# Patient Record
Sex: FEMALE | Race: WHITE | NOT HISPANIC OR LATINO | Employment: STUDENT | URBAN - METROPOLITAN AREA
[De-identification: names, ages, dates, MRNs, and addresses within clinical notes are randomized per-mention and may not be internally consistent; named-entity substitution may affect disease eponyms.]

---
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
## 2020-02-10 ENCOUNTER — OFFICE VISIT (OUTPATIENT)
Dept: OBSTETRICS AND GYNECOLOGY | Facility: CLINIC | Age: 20
End: 2020-02-10
Payer: MEDICAID

## 2020-02-10 VITALS
SYSTOLIC BLOOD PRESSURE: 118 MMHG | WEIGHT: 212.75 LBS | HEIGHT: 67 IN | DIASTOLIC BLOOD PRESSURE: 70 MMHG | BODY MASS INDEX: 33.39 KG/M2

## 2020-02-10 DIAGNOSIS — B37.31 VAGINITIS DUE TO CANDIDA: Primary | ICD-10-CM

## 2020-02-10 LAB
BACTERIAL VAGINOSIS DNA: NEGATIVE
CANDIDA GLABRATA DNA: NEGATIVE
CANDIDA KRUSEI DNA: NEGATIVE
CANDIDA RRNA VAG QL PROBE: NEGATIVE
T VAGINALIS RRNA GENITAL QL PROBE: NEGATIVE

## 2020-02-10 PROCEDURE — 87661 TRICHOMONAS VAGINALIS AMPLIF: CPT

## 2020-02-10 PROCEDURE — 99999 PR PBB SHADOW E&M-EST. PATIENT-LVL II: CPT | Mod: PBBFAC,,, | Performed by: ADVANCED PRACTICE MIDWIFE

## 2020-02-10 PROCEDURE — 99999 PR PBB SHADOW E&M-EST. PATIENT-LVL II: ICD-10-PCS | Mod: PBBFAC,,, | Performed by: ADVANCED PRACTICE MIDWIFE

## 2020-02-10 PROCEDURE — 99213 PR OFFICE/OUTPT VISIT, EST, LEVL III, 20-29 MIN: ICD-10-PCS | Mod: S$PBB,,, | Performed by: ADVANCED PRACTICE MIDWIFE

## 2020-02-10 PROCEDURE — 87801 DETECT AGNT MULT DNA AMPLI: CPT

## 2020-02-10 PROCEDURE — 99213 OFFICE O/P EST LOW 20 MIN: CPT | Mod: S$PBB,,, | Performed by: ADVANCED PRACTICE MIDWIFE

## 2020-02-10 PROCEDURE — 87481 CANDIDA DNA AMP PROBE: CPT | Mod: 59

## 2020-02-10 PROCEDURE — 99212 OFFICE O/P EST SF 10 MIN: CPT | Mod: PBBFAC,TH | Performed by: ADVANCED PRACTICE MIDWIFE

## 2020-02-10 RX ORDER — SERTRALINE HYDROCHLORIDE 25 MG/1
25 TABLET, FILM COATED ORAL DAILY
Qty: 30 TABLET | Refills: 2 | Status: SHIPPED | OUTPATIENT
Start: 2020-02-10 | End: 2023-08-29

## 2020-02-10 NOTE — PROGRESS NOTES
Here for C/O depression for past 2-3 months  Feels like its getting worse,  Denies suicidal thoughts   or urge to harm 5 month old son. Says she has help and good support at home with her mother and .    Also may have yeast infection. Discussed seeking counselor for post-partum depression    I gave her a list of providers to call for appointment.  She agrees to it.  Also gave RX to start  Zoloft QD at 25mg with 2 refills.  Stressed the need for her to call if symptoms worsen or if she   experiences any suicidal thoughts or thoughts of harming her baby.  She agrees.

## 2021-04-16 ENCOUNTER — PATIENT MESSAGE (OUTPATIENT)
Dept: RESEARCH | Facility: HOSPITAL | Age: 21
End: 2021-04-16

## 2022-09-08 ENCOUNTER — OFFICE VISIT (OUTPATIENT)
Dept: URGENT CARE | Facility: CLINIC | Age: 22
End: 2022-09-08
Payer: MEDICAID

## 2022-09-08 VITALS
OXYGEN SATURATION: 98 % | RESPIRATION RATE: 17 BRPM | HEIGHT: 67 IN | WEIGHT: 201 LBS | SYSTOLIC BLOOD PRESSURE: 131 MMHG | TEMPERATURE: 98 F | BODY MASS INDEX: 31.55 KG/M2 | HEART RATE: 81 BPM | DIASTOLIC BLOOD PRESSURE: 89 MMHG

## 2022-09-08 DIAGNOSIS — R05.9 COUGH: Primary | ICD-10-CM

## 2022-09-08 DIAGNOSIS — J06.9 URI, ACUTE: ICD-10-CM

## 2022-09-08 LAB
CTP QC/QA: YES
SARS-COV-2 RDRP RESP QL NAA+PROBE: NEGATIVE

## 2022-09-08 PROCEDURE — U0002: ICD-10-PCS | Mod: QW,S$GLB,, | Performed by: FAMILY MEDICINE

## 2022-09-08 PROCEDURE — 99203 OFFICE O/P NEW LOW 30 MIN: CPT | Mod: S$GLB,,, | Performed by: FAMILY MEDICINE

## 2022-09-08 PROCEDURE — U0002 COVID-19 LAB TEST NON-CDC: HCPCS | Mod: QW,S$GLB,, | Performed by: FAMILY MEDICINE

## 2022-09-08 PROCEDURE — 3079F PR MOST RECENT DIASTOLIC BLOOD PRESSURE 80-89 MM HG: ICD-10-PCS | Mod: CPTII,S$GLB,, | Performed by: FAMILY MEDICINE

## 2022-09-08 PROCEDURE — 3079F DIAST BP 80-89 MM HG: CPT | Mod: CPTII,S$GLB,, | Performed by: FAMILY MEDICINE

## 2022-09-08 PROCEDURE — 1159F MED LIST DOCD IN RCRD: CPT | Mod: CPTII,S$GLB,, | Performed by: FAMILY MEDICINE

## 2022-09-08 PROCEDURE — 1159F PR MEDICATION LIST DOCUMENTED IN MEDICAL RECORD: ICD-10-PCS | Mod: CPTII,S$GLB,, | Performed by: FAMILY MEDICINE

## 2022-09-08 PROCEDURE — 3075F SYST BP GE 130 - 139MM HG: CPT | Mod: CPTII,S$GLB,, | Performed by: FAMILY MEDICINE

## 2022-09-08 PROCEDURE — 3075F PR MOST RECENT SYSTOLIC BLOOD PRESS GE 130-139MM HG: ICD-10-PCS | Mod: CPTII,S$GLB,, | Performed by: FAMILY MEDICINE

## 2022-09-08 PROCEDURE — 3008F PR BODY MASS INDEX (BMI) DOCUMENTED: ICD-10-PCS | Mod: CPTII,S$GLB,, | Performed by: FAMILY MEDICINE

## 2022-09-08 PROCEDURE — 3008F BODY MASS INDEX DOCD: CPT | Mod: CPTII,S$GLB,, | Performed by: FAMILY MEDICINE

## 2022-09-08 PROCEDURE — 99203 PR OFFICE/OUTPT VISIT, NEW, LEVL III, 30-44 MIN: ICD-10-PCS | Mod: S$GLB,,, | Performed by: FAMILY MEDICINE

## 2022-09-08 RX ORDER — ALBUTEROL SULFATE 90 UG/1
2 AEROSOL, METERED RESPIRATORY (INHALATION) EVERY 6 HOURS PRN
Qty: 18 G | Refills: 3 | Status: SHIPPED | OUTPATIENT
Start: 2022-09-08

## 2022-09-08 RX ORDER — LORATADINE 10 MG/1
10 TABLET ORAL DAILY
Qty: 30 TABLET | Refills: 2 | Status: SHIPPED | OUTPATIENT
Start: 2022-09-08 | End: 2023-09-08

## 2022-09-08 RX ORDER — BENZONATATE 100 MG/1
200 CAPSULE ORAL 3 TIMES DAILY PRN
Qty: 30 CAPSULE | Refills: 1 | Status: SHIPPED | OUTPATIENT
Start: 2022-09-08

## 2022-09-08 NOTE — PROGRESS NOTES
"Subjective:       Patient ID: Annalisa Dimas is a 22 y.o. female.    Vitals:  height is 5' 7" (1.702 m) and weight is 91.2 kg (201 lb). Her oral temperature is 98.3 °F (36.8 °C). Her blood pressure is 131/89 and her pulse is 81. Her respiration is 17 and oxygen saturation is 98%.     Chief Complaint: Sinus Problem    This started Tuesday. She has taken tylenol and dayquil which helped a good bit.  C/o sore throat and body ache and cough x 2 days    Has hx of persistent cough for months denies f/c  Not vaccinated      Sinus Problem  This is a new problem. The current episode started in the past 7 days. The problem has been gradually worsening since onset. There has been no fever. The fever has been present for Less than 1 day. Her pain is at a severity of 0/10. She is experiencing no pain. Associated symptoms include congestion, coughing, diaphoresis, headaches, sinus pressure and a sore throat. Pertinent negatives include no chills, hoarse voice, sneezing or swollen glands.     Constitution: Positive for sweating. Negative for chills.   HENT:  Positive for congestion, sinus pressure and sore throat.    Respiratory:  Positive for cough.    Allergic/Immunologic: Negative for sneezing.   Neurological:  Positive for headaches.     Objective:      Physical Exam   Constitutional: She is oriented to person, place, and time. She appears well-developed. She is cooperative.  Non-toxic appearance. She does not appear ill. No distress.   HENT:   Head: Normocephalic and atraumatic.   Ears:   Right Ear: Hearing, tympanic membrane, external ear and ear canal normal.   Left Ear: Hearing, tympanic membrane, external ear and ear canal normal.   Nose: Nose normal. No mucosal edema, rhinorrhea or nasal deformity. No epistaxis. Right sinus exhibits no maxillary sinus tenderness and no frontal sinus tenderness. Left sinus exhibits no maxillary sinus tenderness and no frontal sinus tenderness.   Mouth/Throat: Uvula is midline, oropharynx " is clear and moist and mucous membranes are normal. Mucous membranes are moist. No trismus in the jaw. Normal dentition. No uvula swelling. No oropharyngeal exudate. Oropharynx is clear.   Eyes: Conjunctivae and lids are normal. Pupils are equal, round, and reactive to light. Right eye exhibits no discharge. Left eye exhibits no discharge. No scleral icterus. Extraocular movement intact   Neck: Trachea normal and phonation normal. Neck supple.   Cardiovascular: Normal rate, regular rhythm, normal heart sounds and normal pulses.   Pulmonary/Chest: Effort normal and breath sounds normal. No respiratory distress.   Abdominal: Normal appearance and bowel sounds are normal. She exhibits no distension and no mass. Soft. There is no abdominal tenderness.   Musculoskeletal: Normal range of motion.         General: No deformity. Normal range of motion.   Neurological: She is alert and oriented to person, place, and time. She exhibits normal muscle tone. Coordination normal.   Skin: Skin is warm, dry, intact, not diaphoretic and not pale.   Psychiatric: Her speech is normal and behavior is normal. Judgment and thought content normal.   Nursing note and vitals reviewed.      Assessment:       1. Cough    2. URI, acute          Plan:         Cough  -     POCT COVID-19 Rapid Screening    URI, acute    Other orders  -     benzonatate (TESSALON PERLES) 100 MG capsule; Take 2 capsules (200 mg total) by mouth 3 (three) times daily as needed for Cough.  Dispense: 30 capsule; Refill: 1  -     loratadine (CLARITIN) 10 mg tablet; Take 1 tablet (10 mg total) by mouth once daily.  Dispense: 30 tablet; Refill: 2  -     albuterol (VENTOLIN HFA) 90 mcg/actuation inhaler; Inhale 2 puffs into the lungs every 6 (six) hours as needed for Wheezing. Rescue  Dispense: 18 g; Refill: 3             Results for orders placed or performed in visit on 09/08/22   POCT COVID-19 Rapid Screening   Result Value Ref Range    POC Rapid COVID Negative Negative      Acceptable Yes

## 2023-08-18 ENCOUNTER — TELEPHONE (OUTPATIENT)
Dept: OBSTETRICS AND GYNECOLOGY | Facility: CLINIC | Age: 23
End: 2023-08-18
Payer: MEDICAID

## 2023-08-18 ENCOUNTER — HOSPITAL ENCOUNTER (EMERGENCY)
Facility: OTHER | Age: 23
Discharge: HOME | End: 2023-08-18
Attending: EMERGENCY MEDICINE
Payer: MEDICAID

## 2023-08-18 VITALS
HEART RATE: 98 BPM | BODY MASS INDEX: 37.03 KG/M2 | WEIGHT: 250 LBS | DIASTOLIC BLOOD PRESSURE: 67 MMHG | SYSTOLIC BLOOD PRESSURE: 118 MMHG | RESPIRATION RATE: 18 BRPM | TEMPERATURE: 99 F | HEIGHT: 69 IN | OXYGEN SATURATION: 98 %

## 2023-08-18 DIAGNOSIS — O20.9 BLEEDING IN EARLY PREGNANCY: ICD-10-CM

## 2023-08-18 DIAGNOSIS — O20.9 VAGINAL BLEEDING IN PREGNANCY, FIRST TRIMESTER: Primary | ICD-10-CM

## 2023-08-18 LAB
ALBUMIN SERPL BCP-MCNC: 3.5 G/DL (ref 3.5–5.2)
ALP SERPL-CCNC: 65 U/L (ref 55–135)
ALT SERPL W/O P-5'-P-CCNC: 31 U/L (ref 10–44)
ANION GAP SERPL CALC-SCNC: 9 MMOL/L (ref 8–16)
AST SERPL-CCNC: 27 U/L (ref 10–40)
B-HCG UR QL: POSITIVE
BASOPHILS # BLD AUTO: 0.07 K/UL (ref 0–0.2)
BASOPHILS NFR BLD: 0.8 % (ref 0–1.9)
BILIRUB SERPL-MCNC: 0.3 MG/DL (ref 0.1–1)
BUN SERPL-MCNC: 9 MG/DL (ref 6–20)
CALCIUM SERPL-MCNC: 9.2 MG/DL (ref 8.7–10.5)
CHLORIDE SERPL-SCNC: 108 MMOL/L (ref 95–110)
CO2 SERPL-SCNC: 21 MMOL/L (ref 23–29)
CREAT SERPL-MCNC: 0.8 MG/DL (ref 0.5–1.4)
CTP QC/QA: YES
DIFFERENTIAL METHOD: NORMAL
EOSINOPHIL # BLD AUTO: 0.2 K/UL (ref 0–0.5)
EOSINOPHIL NFR BLD: 2 % (ref 0–8)
ERYTHROCYTE [DISTWIDTH] IN BLOOD BY AUTOMATED COUNT: 13.1 % (ref 11.5–14.5)
EST. GFR  (NO RACE VARIABLE): >60 ML/MIN/1.73 M^2
GLUCOSE SERPL-MCNC: 110 MG/DL (ref 70–110)
HCG INTACT+B SERPL-ACNC: NORMAL MIU/ML
HCT VFR BLD AUTO: 40.2 % (ref 37–48.5)
HCV AB SERPL QL IA: NEGATIVE
HGB BLD-MCNC: 13.4 G/DL (ref 12–16)
HIV 1+2 AB+HIV1 P24 AG SERPL QL IA: NEGATIVE
IMM GRANULOCYTES # BLD AUTO: 0.04 K/UL (ref 0–0.04)
IMM GRANULOCYTES NFR BLD AUTO: 0.4 % (ref 0–0.5)
LYMPHOCYTES # BLD AUTO: 2.5 K/UL (ref 1–4.8)
LYMPHOCYTES NFR BLD: 27.2 % (ref 18–48)
MCH RBC QN AUTO: 29.8 PG (ref 27–31)
MCHC RBC AUTO-ENTMCNC: 33.3 G/DL (ref 32–36)
MCV RBC AUTO: 89 FL (ref 82–98)
MONOCYTES # BLD AUTO: 0.6 K/UL (ref 0.3–1)
MONOCYTES NFR BLD: 6.8 % (ref 4–15)
NEUTROPHILS # BLD AUTO: 5.8 K/UL (ref 1.8–7.7)
NEUTROPHILS NFR BLD: 62.8 % (ref 38–73)
NRBC BLD-RTO: 0 /100 WBC
PLATELET # BLD AUTO: 352 K/UL (ref 150–450)
PMV BLD AUTO: 9.6 FL (ref 9.2–12.9)
POTASSIUM SERPL-SCNC: 4 MMOL/L (ref 3.5–5.1)
PROT SERPL-MCNC: 7.3 G/DL (ref 6–8.4)
RBC # BLD AUTO: 4.5 M/UL (ref 4–5.4)
SODIUM SERPL-SCNC: 138 MMOL/L (ref 136–145)
WBC # BLD AUTO: 9.15 K/UL (ref 3.9–12.7)

## 2023-08-18 PROCEDURE — 84702 CHORIONIC GONADOTROPIN TEST: CPT | Performed by: PHYSICIAN ASSISTANT

## 2023-08-18 PROCEDURE — 80053 COMPREHEN METABOLIC PANEL: CPT | Performed by: PHYSICIAN ASSISTANT

## 2023-08-18 PROCEDURE — 25000003 PHARM REV CODE 250: Performed by: PHYSICIAN ASSISTANT

## 2023-08-18 PROCEDURE — 85025 COMPLETE CBC W/AUTO DIFF WBC: CPT | Performed by: PHYSICIAN ASSISTANT

## 2023-08-18 PROCEDURE — 86803 HEPATITIS C AB TEST: CPT | Performed by: PHYSICIAN ASSISTANT

## 2023-08-18 PROCEDURE — 99284 EMERGENCY DEPT VISIT MOD MDM: CPT | Mod: 25

## 2023-08-18 PROCEDURE — 81025 URINE PREGNANCY TEST: CPT | Performed by: PHYSICIAN ASSISTANT

## 2023-08-18 PROCEDURE — 96361 HYDRATE IV INFUSION ADD-ON: CPT

## 2023-08-18 PROCEDURE — 87389 HIV-1 AG W/HIV-1&-2 AB AG IA: CPT | Performed by: PHYSICIAN ASSISTANT

## 2023-08-18 PROCEDURE — 96360 HYDRATION IV INFUSION INIT: CPT

## 2023-08-18 RX ADMIN — SODIUM CHLORIDE 1000 ML: 0.9 INJECTION, SOLUTION INTRAVENOUS at 01:08

## 2023-08-18 NOTE — TELEPHONE ENCOUNTER
Tried contacting pt in regards to scheduling appt. No answer. Left VM for pt to contact clinic back.

## 2023-08-18 NOTE — TELEPHONE ENCOUNTER
----- Message from Anabel Wyatt MD sent at 8/18/2023  3:57 PM CDT -----  Hello,    This patient presented to the ED for vaginal bleeding in early pregnancy. Her ultrasound findings are inconclusive and she will need a repeat U/S in 11 days (8/29) to determine viability. Can we have her follow up after this U/S in our clinic? Thanks!    Anabel Wyatt MD  OBGYN PGY-3

## 2023-08-18 NOTE — CARE UPDATE
Pt presented to [facility] for evaluation. Chief complaint: vaginal bleeding, noticed while taking a shower this AM. She is ambulatory and has no signs or symptoms of anemia. Pt is approximately 6 weeks pregnant, has not yet established prenatal care.   Temp:  [98.2 °F (36.8 °C)] 98.2 °F (36.8 °C)  Pulse:  [107] 107  Resp:  [20] 20  SpO2:  [98 %] 98 %  BP: (132)/(74) 132/74    Pt to be transported to main ED for further evaluation.  Bridgette Rivas MD  OB Hospitalist

## 2023-08-18 NOTE — DISCHARGE INSTRUCTIONS
You were seen in the ER for vaginal bleeding. Based on your ultrasound, there is concern that you may be at risk for having a miscarriage. Given this, OB recommends you have a repeat ultrasound. They will get in touch with your midwives and have you scheduled for this ultrasound.  Return to the ER if you begin soaking through 1 pad an hour, if you began having heavy bleeding with clots or with associated lightheadedness or syncope, or for any new or worsening symptoms.

## 2023-08-18 NOTE — ED NOTES
Pt to dispo with c.o vaginal bleeding onset last night with cramping. Pt had +home preg test and is approx 6 wk ob.  Pt has not had US. Pt has appt scheduled.  Pt states she passed a clot that appeared to be tissue last night. Pt states bleeding and cramping has subsided.  AAO x 3 nadn skin w.d  conjunctiva pink   pt placed in gown

## 2023-08-20 NOTE — ED PROVIDER NOTES
Encounter Date: 2023       History     Chief Complaint   Patient presents with    Vaginal Bleeding     Reports vag bleeding that started last night, no bleeding today, white pinkish color. +6 weeks pregnant. Denies UTI symptoms.     Afebrile 23-year-old female who is  with current pregnancy at 6 weeks gestation presents the ED for evaluation of vaginal bleeding.  Patient states that bleeding occurred last night.  States bleeding has since resolved.  Does report some associated cramping.  States last night she felt like she passed area of tissue.  States that the cramping has since improved.  Any fever, chills, vomiting, dysuria, hematuria or additional complaints.  She does follow with the [facility] bir[facility].    The history is provided by the patient.     Review of patient's allergies indicates:  No Known Allergies  Past Medical History:   Diagnosis Date    Postpartum depression 2/10/2020     No past surgical history on file.  Family History   Problem Relation Age of Onset    Cancer Maternal Grandmother     Hypertension Maternal Grandmother     Hypertension Mother     Arrhythmia Neg Hx     Cardiomyopathy Neg Hx     Congenital heart disease Neg Hx     Heart attacks under age 50 Neg Hx     Pacemaker/defibrilator Neg Hx      Social History     Tobacco Use    Smoking status: Never    Smokeless tobacco: Never   Substance Use Topics    Alcohol use: No    Drug use: No     Review of Systems  See HPI  Physical Exam     Initial Vitals [23 1108]   BP Pulse Resp Temp SpO2   132/74 107 20 98.2 °F (36.8 °C) 98 %      MAP       --         Physical Exam    Nursing note and vitals reviewed.  Constitutional: Vital signs are normal. She appears well-developed and well-nourished. She is cooperative.  Non-toxic appearance. She does not appear ill. No distress.   HENT:   Head: Normocephalic and atraumatic.   Eyes: Conjunctivae and lids are normal.   Neck: Neck supple.   Cardiovascular:  Normal rate and regular  rhythm.           Pulmonary/Chest: Breath sounds normal. No respiratory distress. She has no wheezes. She has no rhonchi.   Abdominal: Abdomen is soft. Bowel sounds are normal. There is no abdominal tenderness. There is no rigidity and no guarding.   Musculoskeletal:         General: Normal range of motion.      Cervical back: Neck supple. No rigidity.     Neurological: She is alert and oriented to person, place, and time. She has normal strength. GCS score is 15. GCS eye subscore is 4. GCS verbal subscore is 5. GCS motor subscore is 6.   Skin: Skin is warm, dry and intact. No rash noted.   Psychiatric: She has a normal mood and affect. Her speech is normal and behavior is normal. Thought content normal.         ED Course   Procedures  Labs Reviewed   COMPREHENSIVE METABOLIC PANEL - Abnormal; Notable for the following components:       Result Value    CO2 21 (*)     All other components within normal limits    Narrative:     Release to patient->Immediate   POCT URINE PREGNANCY - Abnormal; Notable for the following components:    POC Preg Test, Ur Positive (*)     All other components within normal limits   HIV 1 / 2 ANTIBODY    Narrative:     Release to patient->Immediate   HEPATITIS C ANTIBODY    Narrative:     Release to patient->Immediate   CBC W/ AUTO DIFFERENTIAL    Narrative:     Release to patient->Immediate   HCG, QUANTITATIVE    Narrative:     Release to patient->Immediate          Imaging Results              US OB <14 Wks TransAbd & TransVag, Single Gestation (XPD) (Final result)  Result time 08/18/23 14:41:43      Final result by Artur Dietz Jr., MD (08/18/23 14:41:43)                   Impression:      Very early intrauterine gestation of unknown viability and an average ultrasound age 6 weeks 0 days.  Short-term follow-up is suggested.      Electronically signed by: Artur Quiroz Jr  Date:    08/18/2023  Time:    14:41               Narrative:    EXAMINATION:  US OB <14 WEEKS, TRANSABDOM &  TRANSVAG, SINGLE GESTATION (XPD)    CLINICAL HISTORY:  Vag Bleeding;    TECHNIQUE:  Transabdominal sonography of the pelvis was performed, followed by transvaginal sonography to better evaluate the uterus and ovaries.    COMPARISON:  2019    FINDINGS:  Intrauterine gestation(s): Single    Mean gestational sac diameter: 1.2 cm    Yolk sac: Present    Crown-rump length (CRL): No fetal pole identified    Cardiac activity: None    Subchorionic hemorrhage: None.    Right ovary: Normal.    Left ovary: Normal.    Miscellaneous: No Free Fluid.                                       Medications   sodium chloride 0.9% bolus 1,000 mL 1,000 mL (0 mLs Intravenous Stopped 23 1542)     Medical Decision Making  Amount and/or Complexity of Data Reviewed  Labs: ordered.  Radiology: ordered.    Historical chart review reveals Rh positive           ED Course as of 23 1701   Fri Aug 18, 2023   1621 I assumed care of patient from Jovana Del Toro PA-C. OB provider had initially indicated concern for missed  and wanted to evaluate patient. Upon assuming care of patient, OB provider called back and clarified that they took another look at patient's ultrasound and labs and believe this is classified as vaginal bleeding during pregnancy. Do not believe diagnosis of missed  is indicated at this time. Would like patient to obtain repeat ultrasound in 1-2 weeks and follow up with midwives. They do not believe they need to evaluate patient at this time. I informed patient of this and answered all questions to patient's satisfaction. Will plan on performing pelvic exam and discharging with close follow up. Strict return precautions given and patient verbalized understanding.  [SW]   7279 Pelvic exam performed, no active bleeding, cervix closed, vaginal mucosa pink and moist, no significant discharge. On bimanual exam, no CMT, adnexal, uterine tenderness. Patient stable for discharge with outpatient follow up and  return precautions.  [SW]      ED Course User Index  [SW] Starla Browne, HARDEEP               Medical Decision Making:   History:   Old Medical Records: I decided to obtain old medical records.  Initial Assessment:   Emergent evaluation 23-year-old female presenting the ED additional bleeding in early pregnancy of unknown location.  She appears well in no significant distress.  Abdomen is soft and no tenderness.   exam deferred after ultrasound.  Remaining exam unremarkable  Differential Diagnosis:   Differential Diagnosis includes, but is not limited to:  Pregnancy complication (threatened AB, inevitable AB, incomplete Ab, missed AB, uterine rupture, ectopic pregnancy, placental abruption, placenta previa), ovarian cyst/torsion, STD, foreign body, trauma, normal menstruation.    Clinical Tests:   Lab Tests: Reviewed and Ordered  Radiological Study: Reviewed and Ordered  ED Management:  Labs with no leukocytosis, hCG consistent with pregnancy with 17,221 here.  Ultrasound does reveal IUP at however unknown viability is no fetal pole or heart rate activity at this time.  Did discuss findings with OB.  Initially reported discussing meds with expected verses interventions as suspect failed intrauterine pregnancy however in the ER.      Clinical Impression:   Final diagnoses:  [O20.9] Vaginal bleeding in pregnancy, first trimester (Primary)        ED Disposition Condition    Discharge Stable          ED Prescriptions    None       Follow-up Information       Follow up With Specialties Details Why Contact Info    Your midwife  Schedule an appointment as soon as possible for a visit in 1 week      Repeat ultrasound appointment  Go to       [facility] Dept Emergency Medicine Go to  If symptoms worsen [address] [facility] [id number]  [phone]             Jovana Del Toro PA  08/20/23 [id number]

## 2023-08-21 ENCOUNTER — LAB VISIT (OUTPATIENT)
Dept: LAB | Facility: HOSPITAL | Age: 23
End: 2023-08-21
Payer: MEDICAID

## 2023-08-21 ENCOUNTER — TELEPHONE (OUTPATIENT)
Dept: OBSTETRICS AND GYNECOLOGY | Facility: CLINIC | Age: 23
End: 2023-08-21
Payer: MEDICAID

## 2023-08-21 DIAGNOSIS — N93.9 VAGINAL BLEEDING: ICD-10-CM

## 2023-08-21 DIAGNOSIS — Z32.01 POSITIVE PREGNANCY TEST: Primary | ICD-10-CM

## 2023-08-21 DIAGNOSIS — N93.9 VAGINAL BLEEDING: Primary | ICD-10-CM

## 2023-08-21 LAB — HCG INTACT+B SERPL-ACNC: NORMAL MIU/ML

## 2023-08-21 PROCEDURE — 84702 CHORIONIC GONADOTROPIN TEST: CPT

## 2023-08-21 PROCEDURE — 36415 COLL VENOUS BLD VENIPUNCTURE: CPT

## 2023-08-21 NOTE — TELEPHONE ENCOUNTER
Spoke to patient regarding recent ED visit for bleeding, cramping. Bleeding has resolved. Placed order for hcg, will consider moving up US one week. Has amenorrhea appointment scheduled with [facility].

## 2023-08-29 ENCOUNTER — INITIAL PRENATAL (OUTPATIENT)
Dept: OBSTETRICS AND GYNECOLOGY | Facility: CLINIC | Age: 23
End: 2023-08-29
Payer: MEDICAID

## 2023-08-29 ENCOUNTER — HOSPITAL ENCOUNTER (OUTPATIENT)
Dept: PERINATAL CARE | Facility: OTHER | Age: 23
Discharge: HOME | End: 2023-08-29
Payer: MEDICAID

## 2023-08-29 VITALS
WEIGHT: 256.94 LBS | SYSTOLIC BLOOD PRESSURE: 122 MMHG | BODY MASS INDEX: 37.94 KG/M2 | DIASTOLIC BLOOD PRESSURE: 70 MMHG

## 2023-08-29 DIAGNOSIS — E66.9 OBESITY (BMI 35.0-39.9 WITHOUT COMORBIDITY): Primary | ICD-10-CM

## 2023-08-29 DIAGNOSIS — Z3A.01 7 WEEKS GESTATION OF PREGNANCY: ICD-10-CM

## 2023-08-29 DIAGNOSIS — O21.9 NAUSEA AND VOMITING DURING PREGNANCY: ICD-10-CM

## 2023-08-29 DIAGNOSIS — Z34.83 ENCOUNTER FOR SUPERVISION OF OTHER NORMAL PREGNANCY IN THIRD TRIMESTER: ICD-10-CM

## 2023-08-29 DIAGNOSIS — Z32.01 POSITIVE PREGNANCY TEST: ICD-10-CM

## 2023-08-29 DIAGNOSIS — O99.011 ANEMIA AFFECTING PREGNANCY IN FIRST TRIMESTER: ICD-10-CM

## 2023-08-29 PROCEDURE — 99214 OFFICE O/P EST MOD 30 MIN: CPT | Mod: S$PBB,TH,, | Performed by: ADVANCED PRACTICE MIDWIFE

## 2023-08-29 PROCEDURE — 76801 US OB/GYN PROCEDURE (VIEWPOINT): ICD-10-PCS | Mod: 26,,, | Performed by: OBSTETRICS & GYNECOLOGY

## 2023-08-29 PROCEDURE — 76801 OB US < 14 WKS SINGLE FETUS: CPT | Mod: 26,,, | Performed by: OBSTETRICS & GYNECOLOGY

## 2023-08-29 PROCEDURE — 99212 OFFICE O/P EST SF 10 MIN: CPT | Mod: PBBFAC,25,TH | Performed by: ADVANCED PRACTICE MIDWIFE

## 2023-08-29 PROCEDURE — 99214 PR OFFICE/OUTPT VISIT, EST, LEVL IV, 30-39 MIN: ICD-10-PCS | Mod: S$PBB,TH,, | Performed by: ADVANCED PRACTICE MIDWIFE

## 2023-08-29 PROCEDURE — 99999 PR PBB SHADOW E&M-EST. PATIENT-LVL II: ICD-10-PCS | Mod: PBBFAC,,, | Performed by: ADVANCED PRACTICE MIDWIFE

## 2023-08-29 PROCEDURE — 76801 OB US < 14 WKS SINGLE FETUS: CPT

## 2023-08-29 PROCEDURE — 99999 PR PBB SHADOW E&M-EST. PATIENT-LVL II: CPT | Mod: PBBFAC,,, | Performed by: ADVANCED PRACTICE MIDWIFE

## 2023-08-29 RX ORDER — PRENATAL VIT NO.126/IRON/FOLIC 28MG-0.8MG
1 TABLET ORAL DAILY
Qty: 90 TABLET | Refills: 3 | Status: SHIPPED | OUTPATIENT
Start: 2023-08-29 | End: 2023-08-30

## 2023-08-29 RX ORDER — ONDANSETRON 4 MG/1
4 TABLET, FILM COATED ORAL EVERY 6 HOURS PRN
Qty: 30 TABLET | Refills: 1 | Status: SHIPPED | OUTPATIENT
Start: 2023-08-29 | End: 2023-08-30

## 2023-08-29 RX ORDER — PROMETHAZINE HYDROCHLORIDE 25 MG/1
25 TABLET ORAL EVERY 6 HOURS PRN
Qty: 30 TABLET | Refills: 1 | Status: SHIPPED | OUTPATIENT
Start: 2023-08-29 | End: 2023-08-30

## 2023-08-29 RX ORDER — DOCUSATE SODIUM 100 MG/1
100 CAPSULE, LIQUID FILLED ORAL 2 TIMES DAILY PRN
Qty: 30 CAPSULE | Refills: 1 | Status: SHIPPED | OUTPATIENT
Start: 2023-08-29 | End: 2023-08-30

## 2023-08-29 RX ORDER — FERROUS SULFATE 325(65) MG
325 TABLET, DELAYED RELEASE (ENTERIC COATED) ORAL
Qty: 90 TABLET | Refills: 3 | Status: SHIPPED | OUTPATIENT
Start: 2023-08-29 | End: 2023-08-30

## 2023-08-29 NOTE — PROGRESS NOTES
Annalisa Dietrich is a 23 y.o. , presents today for amenorrrhea visit. Now 7 + 2/7 weeks.   See in ER 23, HCG Quant 17,221. Increased to 26,252 on 23. No lof/BRVB, dysuria, fever/chills or abdominal pain/cramps. + N&V not relieved with comfort measures.   US today = IUP at 7 + 2/7 weeks with EDC of 23.     OBHX:   x 2 at term, proven to # 8 lb 1 oz.   Hx PP Depression following last delivery tx briefly with SSRI. No current issues.  Obesity- plan HgbA1C with new ob labs. Declines nutrition consult.   Anemia in pregnancy-begin Po Iron.  Nausea -RX for Zofran, Phenergan.  PAP 2019 wnl.   PMHX:   Obesity, BMI 37%  SOCIAL HISTORY: Denies emotional/mental/physical/sexual violence or abuse. Feels safe at home. Happily wed to [facility].   Denies tobacco, etoh or substance use in pregnancy.   NKDA  SURGHX: None  MEDS:   Daily PNV    Review of patient's allergies indicates:  No Known Allergies  Past Medical History:   Diagnosis Date    Anemia affecting pregnancy in first trimester 2023    Postpartum depression 2/10/2020     History reviewed. No pertinent surgical history.  History reviewed. No pertinent surgical history.  OB History    Para Term  AB Living   3 2 2   0 2   SAB IAB Ectopic Multiple Live Births         0 2      # Outcome Date GA Lbr Giovani/2nd Weight Sex Delivery Anes PTL Lv   3 Current            2 Term 19 39w5d  3.62 kg (7 lb 15.7 oz) M Vag-Spont EPI N ELINOR   1 Term     F Vag-Spont   ELINOR     OB History        3    Para   2    Term   2            AB   0    Living   2       SAB        IAB        Ectopic        Multiple   0    Live Births   2               Social History     Socioeconomic History    Marital status:    Tobacco Use    Smoking status: Never    Smokeless tobacco: Never   Substance and Sexual Activity    Alcohol use: No    Drug use: No    Sexual activity: Yes   Social History Narrative    Fob aunt  @ 36 from heart attack      Family History   Problem Relation Age of Onset    Cancer Maternal Grandmother     Hypertension Maternal Grandmother     Hypertension Mother     Arrhythmia Neg Hx     Cardiomyopathy Neg Hx     Congenital heart disease Neg Hx     Heart attacks under age 50 Neg Hx     Pacemaker/defibrilator Neg Hx      Social History     Substance and Sexual Activity   Sexual Activity Yes       GENETIC SCREENING   Patient's age 35 years or older as of estimated date of delivery? no  Neural tube defect (meningomyelocele, spina bifida, or anencephaly)? No  Down syndrome? no  Gagan-Sachs (Ashkenazi Gnosticism, Cajun, Italian Swazi)? no  Canavan disease (Ashkenazi Gnosticism)? no  Familial dysautonomia (Ashkenazi Gnosticism)? no  Sickle cell disease or trait ()? denies  Hemophilia or other blood disorders? no  Cystic fibrosis? no  Muscular dystrophy? no  Belknap's chorea? no  Thalassemia (Italian, Greek, Mediterranean, or  background) MCV less than 80? no  Congenital heart defect? no  Mental retardation/autism? no   If Yes, was person tested for Fragile X? no  Other inherited genetic or chromosomal disorder? no  Maternal metabolic disorder (e.g. type 1 diabetes, PKU)? no  Patient or baby's father had a child with birth defects not listed above? no  Recurrent pregnancy loss or a stillbirth: no  Medications (including supplements, vitamins, herbs or OTC drugs)/illicit/recreational drugs/alcohol since last menstrual period? PNV   If yes, agent(s) and strength/dose:   List any other genetic risks:  Comments/counseling:     INFECTION HISTORY  Live with someone with TB or exposed to TB: no  Patient or partner has history of genital herpes: no  Rash or viral illness since last menstrual period: no  Patient or partner has hepatitis B or C: no  History of STD, gonorrhea, chlamydia, HPV, HIV, syphilis (list all that apply): no  List other infections: no    ROS:  Constitutional/Gen: Denies fevers, chills, malaise, or weight loss. +  N&V  Psych: Denies depression, anxiety  Eyes: Denies changes in vision or scotomata  Ears, nose, mouth, throat: Denies sinus tenderness, swelling, or dentition problems  CV/vasc: Denies heart palpitations or edema  Resp: Denies SOB or dyspnea  Breasts: Denies mass, nipple discharge, or trauma.   GI: Denies constipation or diarrhea.  : Denies vaginal discharge, dysuria or pelvic pain.   MS: Denies weakness, soreness, or changes in ROM    OBJECTIVE:  /70   Wt 116.6 kg (256 lb 15.1 oz)   BMI 37.94 kg/m²   Constitutional/Gen: NAD, appears stated age, well groomed, NAD  Neck: supple, no masses or enlargement  Head: normocephalic  Skin: warm and dry w/o rash  Lung: normal resp effort, CTAB  Heart: normal HR, RRR   Back: negative CVAT  Breasts: Deferred  Abdomen: soft, nontender, no masses, and bowel sounds normal, no enlargement  Pelvic: Deferred  Adnexa: no masses or tenderness  Extremities: FROM, with no edema or tenderness.  Neurologic: A&O x 4, non-focal, cranial nerves 2-12 grossly intact  Psych: affect appropriate and without signs of mood, thought or memory difficulty appreciated    ASSESSMENT:  23 y.o. female  at 7 + 2/7 weekg gestation  Patient Active Problem List   Diagnosis    Postpartum depression    Positive pregnancy test    Obesity (BMI 35.0-39.9 without comorbidity)    7 weeks gestation of pregnancy    Nausea and vomiting during pregnancy    Anemia affecting pregnancy in first trimester       PLAN:    Pregnancy education and couseling; handouts and booklet provided  -- Oriented to practice and anticipated prenatal course of care and how to contact us  -- Reviewed [facility] guidelines and admission, exclusion, and transfer criteria  -- Precautions/warning signs reviewed  -- Common complaints of pregnancy  -- Routine prenatal labs including HIV, CBC and Hep C reviewed and discussed. + Anemia.  -- Ultrasounds-plan anatomy at 20 weeks.   -- Childbirth education/hospital/[facility] facilities  --  Nutrition, prepregnant BMI, and recommended weight gain  -- Toxoplasmosis precautions (Cats/Raw Meat)  -- Sexual activity and exercise  -- Environmental/Work hazards  -- Travel  -- Tobacco, as well as alcohol and drug use  -- Use of any medications (Including supplements, Vitamins, Herbs, or OTC Drugs)  -- Domestic violence screen     Vaccines:    - Has not received Influenza vaccine for 2022/2023 season   - Reports has not received any doses of Covid-19 vaccine. Does not desire.   - Education regarding vaccine recommendations in pregnancy; handouts given.    Body mass index is 37.94 kg/m².  -- Discussed IOM recommended weight gain of:   Weight category Pre pre BMI  Recommended TWG   Underweight Less than 18.5 28-40    Normal Weight 18.5-24.9  25-35    Overweight 25-29.9  15-25    Obese   30 and greater  11-20   Plan HgbA1C with new ob labs    Nausea  in pregnancy  -- Education regarding lifestyle and dietary modifications  -- Reviewed use of B6/Unisom prn. Pt will notify us if no relief/worsening symptoms, will consider alternative therapies prn  RX for Zofran and Phenergan to pharmacy/discussed.    Reviewed genetic testing options.    -- Reviewed available first trimester and/or second  trimester screening options. Reviewed risk of false positive/negative results and recommendation of referral to MFM in event of a positive result, for NIPT, US, and/or amniocentesis.  Patient desires MT21 then AFP. Plan at next visit with remaining new ob labs and PAP Smear/pelvic exam.  Initial OB with remaining OB labs, PAP/Pelvic, and MT21 in 3 weeks.      Updated Medication List:  Current Outpatient Medications   Medication Sig Dispense Refill    docusate sodium (COLACE) 100 MG capsule Take 1 capsule (100 mg total) by mouth 2 (two) times daily as needed for Constipation. 30 capsule 1    ferrous sulfate 325 (65 FE) MG EC tablet Take 1 tablet (325 mg total) by mouth 3 (three) times daily with meals. 90 tablet 3    ondansetron  (ZOFRAN) 4 MG tablet Take 1 tablet (4 mg total) by mouth every 6 (six) hours as needed for Nausea. 30 tablet 1    prenatal vit no.126-iron-folic (CLASSIC PRENATAL) 28 mg iron- 800 mcg Tab Take 1 tablet by mouth once daily. 90 tablet 3    promethazine (PHENERGAN) 25 MG tablet Take 1 tablet (25 mg total) by mouth every 6 (six) hours as needed for Nausea. 30 tablet 1     No current facility-administered medications for this visit.         Florinda Stevens CNM/NP

## 2023-08-30 DIAGNOSIS — Z34.83 ENCOUNTER FOR SUPERVISION OF OTHER NORMAL PREGNANCY IN THIRD TRIMESTER: ICD-10-CM

## 2023-08-30 RX ORDER — FERROUS SULFATE 325(65) MG
325 TABLET, DELAYED RELEASE (ENTERIC COATED) ORAL
Qty: 90 TABLET | Refills: 3 | Status: SHIPPED | OUTPATIENT
Start: 2023-08-30 | End: 2023-09-19 | Stop reason: CLARIF

## 2023-08-30 RX ORDER — DOCUSATE SODIUM 100 MG/1
100 CAPSULE, LIQUID FILLED ORAL 2 TIMES DAILY PRN
Qty: 30 CAPSULE | Refills: 1 | Status: SHIPPED | OUTPATIENT
Start: 2023-08-30 | End: 2024-02-22 | Stop reason: SDUPTHER

## 2023-08-30 RX ORDER — PROMETHAZINE HYDROCHLORIDE 25 MG/1
25 TABLET ORAL EVERY 6 HOURS PRN
Qty: 30 TABLET | Refills: 1 | OUTPATIENT
Start: 2023-08-30 | End: 2024-01-31

## 2023-08-30 RX ORDER — PRENATAL VIT NO.126/IRON/FOLIC 28MG-0.8MG
1 TABLET ORAL DAILY
Qty: 90 TABLET | Refills: 3 | Status: SHIPPED | OUTPATIENT
Start: 2023-08-30

## 2023-08-30 RX ORDER — ONDANSETRON 4 MG/1
4 TABLET, FILM COATED ORAL EVERY 6 HOURS PRN
Qty: 30 TABLET | Refills: 1 | Status: SHIPPED | OUTPATIENT
Start: 2023-08-30 | End: 2024-02-22

## 2023-09-08 ENCOUNTER — PATIENT MESSAGE (OUTPATIENT)
Dept: OBSTETRICS AND GYNECOLOGY | Facility: CLINIC | Age: 23
End: 2023-09-08
Payer: MEDICAID

## 2023-09-13 ENCOUNTER — TELEPHONE (OUTPATIENT)
Dept: OBSTETRICS AND GYNECOLOGY | Facility: CLINIC | Age: 23
End: 2023-09-13
Payer: MEDICAID

## 2023-09-13 NOTE — TELEPHONE ENCOUNTER
Returned patient's call. Woke up with headache which has improved with Tylenol. When she got out of bed, she became dizzy and lightheaded, and reports that the lightheadedness has not gone away. States that every time she gets up, she feels like she is going to pass out. Had an apple this morning. Discussed eating something substantial and if dizziness does not improve, to seek care at urgent care or ER. Patient verbalized understanding.    ----- Message from Ana Lilia Seals MA sent at 9/13/2023  9:23 AM CDT -----  Contact: [phone]  Patient called complaining of headaches and light headness. Pt is 9.3 weeks pregnant. Please call to advise.

## 2023-09-19 ENCOUNTER — INITIAL PRENATAL (OUTPATIENT)
Dept: OBSTETRICS AND GYNECOLOGY | Facility: CLINIC | Age: 23
End: 2023-09-19
Payer: MEDICAID

## 2023-09-19 VITALS
WEIGHT: 255.63 LBS | SYSTOLIC BLOOD PRESSURE: 130 MMHG | BODY MASS INDEX: 37.75 KG/M2 | DIASTOLIC BLOOD PRESSURE: 70 MMHG

## 2023-09-19 DIAGNOSIS — Z12.4 CERVICAL CANCER SCREENING: ICD-10-CM

## 2023-09-19 DIAGNOSIS — Z3A.10 10 WEEKS GESTATION OF PREGNANCY: ICD-10-CM

## 2023-09-19 DIAGNOSIS — O21.9 NAUSEA AND VOMITING DURING PREGNANCY: ICD-10-CM

## 2023-09-19 DIAGNOSIS — Z34.81 MULTIGRAVIDA IN FIRST TRIMESTER: ICD-10-CM

## 2023-09-19 DIAGNOSIS — O99.211 OBESITY AFFECTING PREGNANCY IN FIRST TRIMESTER: ICD-10-CM

## 2023-09-19 DIAGNOSIS — Z13.79 ENCOUNTER FOR GENETIC SCREENING: ICD-10-CM

## 2023-09-19 DIAGNOSIS — Z11.3 ROUTINE SCREENING FOR STI (SEXUALLY TRANSMITTED INFECTION): ICD-10-CM

## 2023-09-19 DIAGNOSIS — G43.009 MIGRAINE WITHOUT AURA AND WITHOUT STATUS MIGRAINOSUS, NOT INTRACTABLE: ICD-10-CM

## 2023-09-19 DIAGNOSIS — O99.011 ANEMIA AFFECTING PREGNANCY IN FIRST TRIMESTER: Primary | ICD-10-CM

## 2023-09-19 PROBLEM — Z32.01 POSITIVE PREGNANCY TEST: Status: RESOLVED | Noted: 2023-08-29 | Resolved: 2023-09-19

## 2023-09-19 PROBLEM — Z3A.01 7 WEEKS GESTATION OF PREGNANCY: Status: RESOLVED | Noted: 2023-08-29 | Resolved: 2023-09-19

## 2023-09-19 PROCEDURE — 99214 OFFICE O/P EST MOD 30 MIN: CPT | Mod: TH,S$PBB,,

## 2023-09-19 PROCEDURE — 87491 CHLMYD TRACH DNA AMP PROBE: CPT

## 2023-09-19 PROCEDURE — 99999 PR PBB SHADOW E&M-EST. PATIENT-LVL II: ICD-10-PCS | Mod: PBBFAC,,,

## 2023-09-19 PROCEDURE — 88175 CYTOPATH C/V AUTO FLUID REDO: CPT

## 2023-09-19 PROCEDURE — 99212 OFFICE O/P EST SF 10 MIN: CPT | Mod: PBBFAC,TH

## 2023-09-19 PROCEDURE — 87086 URINE CULTURE/COLONY COUNT: CPT

## 2023-09-19 PROCEDURE — 99214 PR OFFICE/OUTPT VISIT, EST, LEVL IV, 30-39 MIN: ICD-10-PCS | Mod: TH,S$PBB,,

## 2023-09-19 PROCEDURE — 99999 PR PBB SHADOW E&M-EST. PATIENT-LVL II: CPT | Mod: PBBFAC,,,

## 2023-09-19 RX ORDER — BUTALBITAL, ACETAMINOPHEN AND CAFFEINE 50; 325; 40 MG/1; MG/1; MG/1
1 TABLET ORAL EVERY 4 HOURS PRN
Qty: 30 TABLET | Refills: 0 | Status: SHIPPED | OUTPATIENT
Start: 2023-09-19 | End: 2023-10-19

## 2023-09-19 RX ORDER — NAPROXEN SODIUM 220 MG/1
81 TABLET, FILM COATED ORAL DAILY
Qty: 90 TABLET | Refills: 3 | Status: ON HOLD | OUTPATIENT
Start: 2023-09-19 | End: 2024-04-03 | Stop reason: HOSPADM

## 2023-09-19 NOTE — PROGRESS NOTES
23 y.o.,  at 10w2d by 1st trimester US      Patient presents today for initial prenatal visit.    Complaints today: headaches.  Doing well overall. Constipation; she was told to take Iron TID at last visit and this has been constipating her.  TWG: -1 lbs     Denies LOF, VB, or cramping/abdominal pain. Reports nausea/vomiting improving    PHYSICAL EXAM  /70   Wt 115.9 kg (255 lb 10 oz)   BMI 37.75 kg/m²   GENERAL: No acute distress  HEENT: Normocephalic, atruamatic  NEURO: Alert and oriented x3  PULMONARY: Non-labored respiration; no tachypnea  ABD: Soft, gravid, nontender    ASSESSMENT AND PLAN  Anemia affecting pregnancy in first trimester  -     CBC Auto Differential; Future; Expected date: 2023    Obesity affecting pregnancy in first trimester  -     HEMOGLOBIN A1C; Future; Expected date: 2023  -     aspirin 81 MG Chew; Take 1 tablet (81 mg total) by mouth once daily.  Dispense: 90 tablet; Refill: 3    10 weeks gestation of pregnancy  -     Rubella Antibody, IgG; Future; Expected date: 2023  -     Type & Screen; Future; Expected date: 2023  -     Urine culture  -     CBC Auto Differential; Future; Expected date: 2023  -     HEMOGLOBIN A1C; Future; Expected date: 2023  -     aspirin 81 MG Chew; Take 1 tablet (81 mg total) by mouth once daily.  Dispense: 90 tablet; Refill: 3  -     Connected MOM Enrollment  -     Assign Connected MOM Program Consent Questionnaire    Routine screening for STI (sexually transmitted infection)  -     RPR; Future; Expected date: 2023  -     C. trachomatis/N. gonorrhoeae by AMP DNA [facility]; Cervicovaginal  -     HEPATITIS B SURFACE ANTIGEN; Future; Expected date: 2023    Cervical cancer screening  -     Liquid-Based Pap Smear, Screening    Multigravida in first trimester    Encounter for genetic screening    Other orders  -     butalbital-acetaminophen-caffeine -40 mg (FIORICET, ESGIC) -40 mg per tablet; Take 1  tablet by mouth every 4 (four) hours as needed for Pain.  Dispense: 30 tablet; Refill: 0         Patient was oriented to practice and progression of routine prenatal care. Reviewed New OB Pregnancy packet & questions answered.   Initial labs and dating u/s reviewed.   Counseled today on proper weight gain based on the [place] of [employer]'s recommendations based on her pre-pregnancy weight.   o Prepregnancy BMI 37 (prepregnancy)    -- Discussed IOM recommended weight gain of:  Weight category Pre pre BMI  Recommended TWG   Underweight Less than 18.5  28-40    Normal Weight 18.5-24.9  25-35    Overweight 25-29.9  15-25    Obese   30 and greater  11-20      Review exercise precautions in pregnancy, along with recommendations. Patient does not exercise regularly.    Discussed diet, along with substances & foods to avoid in pregnancy (i.e. sushi, fish that are high in mercury, deli meat, and unpasteurized cheeses).    Discussed prenatal vitamin options (i.e. stool softener, DHA).     Reviewed aneuploidy screening options and indications, questions answered - patient desires Mat21 and will do today.   Fioricet ordered for headaches, tylenol additionally recommended. She endorses history of migraines   Education regarding warning signs.   Prenatal labs and Hgb A1C today. Upon review of previous blood work, patient not anemic and iron not yet indicated. Discussed with patient.   Pap today   GC/CT today   [facility] [place]    Follow up: 4-6 wks, call or present sooner prn.     Graciela Duque, MARK, APRN

## 2023-09-20 LAB
BACTERIA UR CULT: NORMAL
BACTERIA UR CULT: NORMAL
C TRACH DNA SPEC QL NAA+PROBE: NOT DETECTED
N GONORRHOEA DNA SPEC QL NAA+PROBE: NOT DETECTED

## 2023-09-22 ENCOUNTER — PATIENT MESSAGE (OUTPATIENT)
Dept: ADMINISTRATIVE | Facility: OTHER | Age: 23
End: 2023-09-22
Payer: MEDICAID

## 2023-09-25 LAB
FINAL PATHOLOGIC DIAGNOSIS: NORMAL
Lab: NORMAL

## 2023-09-26 ENCOUNTER — PATIENT MESSAGE (OUTPATIENT)
Dept: OBSTETRICS AND GYNECOLOGY | Facility: CLINIC | Age: 23
End: 2023-09-26
Payer: MEDICAID

## 2023-09-26 ENCOUNTER — TELEPHONE (OUTPATIENT)
Dept: OBSTETRICS AND GYNECOLOGY | Facility: CLINIC | Age: 23
End: 2023-09-26
Payer: MEDICAID

## 2023-09-26 NOTE — TELEPHONE ENCOUNTER
Called pt with neg mat21 results. Pt would like me to send her a portal message with the gender of the baby.

## 2023-10-19 ENCOUNTER — LAB VISIT (OUTPATIENT)
Dept: LAB | Facility: OTHER | Age: 23
End: 2023-10-19
Payer: MEDICAID

## 2023-10-19 ENCOUNTER — ROUTINE PRENATAL (OUTPATIENT)
Dept: OBSTETRICS AND GYNECOLOGY | Facility: CLINIC | Age: 23
End: 2023-10-19
Payer: MEDICAID

## 2023-10-19 VITALS — DIASTOLIC BLOOD PRESSURE: 74 MMHG | WEIGHT: 249.88 LBS | BODY MASS INDEX: 36.9 KG/M2 | SYSTOLIC BLOOD PRESSURE: 124 MMHG

## 2023-10-19 DIAGNOSIS — O99.211 OBESITY AFFECTING PREGNANCY IN FIRST TRIMESTER: ICD-10-CM

## 2023-10-19 DIAGNOSIS — O99.011 ANEMIA AFFECTING PREGNANCY IN FIRST TRIMESTER: ICD-10-CM

## 2023-10-19 DIAGNOSIS — Z11.3 ROUTINE SCREENING FOR STI (SEXUALLY TRANSMITTED INFECTION): ICD-10-CM

## 2023-10-19 DIAGNOSIS — Z3A.14 14 WEEKS GESTATION OF PREGNANCY: Primary | ICD-10-CM

## 2023-10-19 DIAGNOSIS — Z3A.10 10 WEEKS GESTATION OF PREGNANCY: ICD-10-CM

## 2023-10-19 LAB
ABO + RH BLD: NORMAL
BASOPHILS # BLD AUTO: 0.04 K/UL (ref 0–0.2)
BASOPHILS NFR BLD: 0.4 % (ref 0–1.9)
BLD GP AB SCN CELLS X3 SERPL QL: NORMAL
DIFFERENTIAL METHOD: ABNORMAL
EOSINOPHIL # BLD AUTO: 0.3 K/UL (ref 0–0.5)
EOSINOPHIL NFR BLD: 2.5 % (ref 0–8)
ERYTHROCYTE [DISTWIDTH] IN BLOOD BY AUTOMATED COUNT: 12.8 % (ref 11.5–14.5)
HCT VFR BLD AUTO: 37.3 % (ref 37–48.5)
HGB BLD-MCNC: 12.4 G/DL (ref 12–16)
IMM GRANULOCYTES # BLD AUTO: 0.05 K/UL (ref 0–0.04)
IMM GRANULOCYTES NFR BLD AUTO: 0.5 % (ref 0–0.5)
LYMPHOCYTES # BLD AUTO: 1.9 K/UL (ref 1–4.8)
LYMPHOCYTES NFR BLD: 19.1 % (ref 18–48)
MCH RBC QN AUTO: 29.9 PG (ref 27–31)
MCHC RBC AUTO-ENTMCNC: 33.2 G/DL (ref 32–36)
MCV RBC AUTO: 90 FL (ref 82–98)
MONOCYTES # BLD AUTO: 0.6 K/UL (ref 0.3–1)
MONOCYTES NFR BLD: 5.9 % (ref 4–15)
NEUTROPHILS # BLD AUTO: 7.2 K/UL (ref 1.8–7.7)
NEUTROPHILS NFR BLD: 71.6 % (ref 38–73)
NRBC BLD-RTO: 0 /100 WBC
PLATELET # BLD AUTO: 332 K/UL (ref 150–450)
PMV BLD AUTO: 9.8 FL (ref 9.2–12.9)
RBC # BLD AUTO: 4.15 M/UL (ref 4–5.4)
SPECIMEN OUTDATE: NORMAL
WBC # BLD AUTO: 10.03 K/UL (ref 3.9–12.7)

## 2023-10-19 PROCEDURE — 86592 SYPHILIS TEST NON-TREP QUAL: CPT

## 2023-10-19 PROCEDURE — 83036 HEMOGLOBIN GLYCOSYLATED A1C: CPT

## 2023-10-19 PROCEDURE — 99999 PR PBB SHADOW E&M-EST. PATIENT-LVL III: CPT | Mod: PBBFAC,,,

## 2023-10-19 PROCEDURE — 85025 COMPLETE CBC W/AUTO DIFF WBC: CPT

## 2023-10-19 PROCEDURE — 99999 PR PBB SHADOW E&M-EST. PATIENT-LVL III: ICD-10-PCS | Mod: PBBFAC,,,

## 2023-10-19 PROCEDURE — 36415 COLL VENOUS BLD VENIPUNCTURE: CPT

## 2023-10-19 PROCEDURE — 86762 RUBELLA ANTIBODY: CPT

## 2023-10-19 PROCEDURE — 86900 BLOOD TYPING SEROLOGIC ABO: CPT

## 2023-10-19 PROCEDURE — 99213 OFFICE O/P EST LOW 20 MIN: CPT | Mod: TH,S$PBB,,

## 2023-10-19 PROCEDURE — 99213 PR OFFICE/OUTPT VISIT, EST, LEVL III, 20-29 MIN: ICD-10-PCS | Mod: TH,S$PBB,,

## 2023-10-19 PROCEDURE — 99213 OFFICE O/P EST LOW 20 MIN: CPT | Mod: PBBFAC

## 2023-10-19 PROCEDURE — 87340 HEPATITIS B SURFACE AG IA: CPT

## 2023-10-19 NOTE — PROGRESS NOTES
Routine Prenatal Visit    23 y.o.,  at 14w4d by 1T US    Complaints today: still having nausea and vomiting.  Overall doing well without concerns. N/V is better for this pregnancy than previous pregnancies.  TWG: -7 lbs   Denies cramping/contractions, denies leaking vaginal fluid, denies vaginal bleeding.   Reports has not yet felt fetal movement.    Allergies: Patient has no known allergies.    PHYSICAL EXAM  GENERAL: No acute distress  HEENT: Normocephalic, atruamatic  NEURO: Alert and oriented x3  PSYCH: Calm, normal mood and affect  PULMONARY: Non-labored respiration; no tachypnea  ABD: Soft, gravid, nontender  FH = between pubic symphysis & umbilicus    ASSESSMENT AND PLAN  14 weeks gestation of pregnancy  -     Cancel: Maternal Screen AFP (Single Marker); Future; Expected date: 10/19/2023       Reviewed warning signs and pregnancy precautions, along with how/when to call.   Anatomy ultrasound with MFM ordered/discussed.   Reviewed aneuploidy screening:   o Pt has completed [id number] - neg/girl. Education regarding AFP screening today and patient desires - order placed, instructed to have drawn between 16-18 weeks.    Follow up: approx 4 wks, call or present sooner prn.     Norma Morgan, MARK, APRN

## 2023-10-20 LAB
ESTIMATED AVG GLUCOSE: 94 MG/DL (ref 68–131)
HBA1C MFR BLD: 4.9 % (ref 4–5.6)
HBV SURFACE AG SERPL QL IA: NORMAL
RPR SER QL: NORMAL
RUBV IGG SER-ACNC: 27.9 IU/ML
RUBV IGG SER-IMP: REACTIVE

## 2023-10-24 ENCOUNTER — HOSPITAL ENCOUNTER (EMERGENCY)
Facility: OTHER | Age: 23
Discharge: HOME | End: 2023-10-24
Attending: EMERGENCY MEDICINE
Payer: MEDICAID

## 2023-10-24 VITALS
HEART RATE: 96 BPM | RESPIRATION RATE: 18 BRPM | WEIGHT: 248 LBS | OXYGEN SATURATION: 98 % | SYSTOLIC BLOOD PRESSURE: 126 MMHG | BODY MASS INDEX: 36.62 KG/M2 | TEMPERATURE: 98 F | DIASTOLIC BLOOD PRESSURE: 76 MMHG

## 2023-10-24 DIAGNOSIS — O26.899 ABDOMINAL CRAMPING AFFECTING PREGNANCY: ICD-10-CM

## 2023-10-24 DIAGNOSIS — R10.9 ABDOMINAL CRAMPING AFFECTING PREGNANCY: ICD-10-CM

## 2023-10-24 DIAGNOSIS — O21.0 HYPEREMESIS GRAVIDARUM: Primary | ICD-10-CM

## 2023-10-24 LAB
ALBUMIN SERPL BCP-MCNC: 3.4 G/DL (ref 3.5–5.2)
ALP SERPL-CCNC: 57 U/L (ref 55–135)
ALT SERPL W/O P-5'-P-CCNC: 19 U/L (ref 10–44)
ANION GAP SERPL CALC-SCNC: 10 MMOL/L (ref 8–16)
AST SERPL-CCNC: 22 U/L (ref 10–40)
BASOPHILS # BLD AUTO: 0.04 K/UL (ref 0–0.2)
BASOPHILS NFR BLD: 0.4 % (ref 0–1.9)
BILIRUB SERPL-MCNC: 0.3 MG/DL (ref 0.1–1)
BILIRUB UR QL STRIP: NEGATIVE
BUN SERPL-MCNC: 6 MG/DL (ref 6–20)
CALCIUM SERPL-MCNC: 9.5 MG/DL (ref 8.7–10.5)
CHLORIDE SERPL-SCNC: 106 MMOL/L (ref 95–110)
CLARITY UR: CLEAR
CO2 SERPL-SCNC: 19 MMOL/L (ref 23–29)
COLOR UR: COLORLESS
CREAT SERPL-MCNC: 0.7 MG/DL (ref 0.5–1.4)
DIFFERENTIAL METHOD: NORMAL
EOSINOPHIL # BLD AUTO: 0.2 K/UL (ref 0–0.5)
EOSINOPHIL NFR BLD: 1.9 % (ref 0–8)
ERYTHROCYTE [DISTWIDTH] IN BLOOD BY AUTOMATED COUNT: 12.8 % (ref 11.5–14.5)
EST. GFR  (NO RACE VARIABLE): >60 ML/MIN/1.73 M^2
GLUCOSE SERPL-MCNC: 86 MG/DL (ref 70–110)
GLUCOSE UR QL STRIP: NEGATIVE
HCT VFR BLD AUTO: 39.3 % (ref 37–48.5)
HGB BLD-MCNC: 13.3 G/DL (ref 12–16)
HGB UR QL STRIP: NEGATIVE
IMM GRANULOCYTES # BLD AUTO: 0.04 K/UL (ref 0–0.04)
IMM GRANULOCYTES NFR BLD AUTO: 0.4 % (ref 0–0.5)
KETONES UR QL STRIP: NEGATIVE
LEUKOCYTE ESTERASE UR QL STRIP: NEGATIVE
LIPASE SERPL-CCNC: 38 U/L (ref 4–60)
LYMPHOCYTES # BLD AUTO: 2.4 K/UL (ref 1–4.8)
LYMPHOCYTES NFR BLD: 22 % (ref 18–48)
MCH RBC QN AUTO: 29.7 PG (ref 27–31)
MCHC RBC AUTO-ENTMCNC: 33.8 G/DL (ref 32–36)
MCV RBC AUTO: 88 FL (ref 82–98)
MONOCYTES # BLD AUTO: 0.6 K/UL (ref 0.3–1)
MONOCYTES NFR BLD: 5.7 % (ref 4–15)
NEUTROPHILS # BLD AUTO: 7.4 K/UL (ref 1.8–7.7)
NEUTROPHILS NFR BLD: 69.6 % (ref 38–73)
NITRITE UR QL STRIP: NEGATIVE
NRBC BLD-RTO: 0 /100 WBC
PH UR STRIP: 7 [PH] (ref 5–8)
PLATELET # BLD AUTO: 316 K/UL (ref 150–450)
PMV BLD AUTO: 9.7 FL (ref 9.2–12.9)
POTASSIUM SERPL-SCNC: 4 MMOL/L (ref 3.5–5.1)
PROT SERPL-MCNC: 7.2 G/DL (ref 6–8.4)
PROT UR QL STRIP: NEGATIVE
RBC # BLD AUTO: 4.48 M/UL (ref 4–5.4)
SODIUM SERPL-SCNC: 135 MMOL/L (ref 136–145)
SP GR UR STRIP: 1 (ref 1–1.03)
URN SPEC COLLECT METH UR: ABNORMAL
UROBILINOGEN UR STRIP-ACNC: NEGATIVE EU/DL
WBC # BLD AUTO: 10.68 K/UL (ref 3.9–12.7)

## 2023-10-24 PROCEDURE — 25000003 PHARM REV CODE 250: Performed by: PHYSICIAN ASSISTANT

## 2023-10-24 PROCEDURE — 80053 COMPREHEN METABOLIC PANEL: CPT | Performed by: PHYSICIAN ASSISTANT

## 2023-10-24 PROCEDURE — 63600175 PHARM REV CODE 636 W HCPCS: Performed by: NURSE PRACTITIONER

## 2023-10-24 PROCEDURE — 85025 COMPLETE CBC W/AUTO DIFF WBC: CPT | Performed by: PHYSICIAN ASSISTANT

## 2023-10-24 PROCEDURE — 96361 HYDRATE IV INFUSION ADD-ON: CPT

## 2023-10-24 PROCEDURE — 99284 EMERGENCY DEPT VISIT MOD MDM: CPT | Mod: 25

## 2023-10-24 PROCEDURE — 81003 URINALYSIS AUTO W/O SCOPE: CPT | Performed by: PHYSICIAN ASSISTANT

## 2023-10-24 PROCEDURE — 83690 ASSAY OF LIPASE: CPT | Performed by: PHYSICIAN ASSISTANT

## 2023-10-24 PROCEDURE — 96374 THER/PROPH/DIAG INJ IV PUSH: CPT

## 2023-10-24 RX ORDER — METOCLOPRAMIDE 10 MG/1
10 TABLET ORAL EVERY 6 HOURS
Qty: 30 TABLET | Refills: 0 | Status: SHIPPED | OUTPATIENT
Start: 2023-10-24 | End: 2024-02-22 | Stop reason: SDUPTHER

## 2023-10-24 RX ORDER — PROMETHAZINE HYDROCHLORIDE 25 MG/1
25 SUPPOSITORY RECTAL EVERY 6 HOURS PRN
Qty: 10 SUPPOSITORY | Refills: 0 | OUTPATIENT
Start: 2023-10-24 | End: 2024-01-31

## 2023-10-24 RX ORDER — METOCLOPRAMIDE HYDROCHLORIDE 5 MG/ML
10 INJECTION INTRAMUSCULAR; INTRAVENOUS
Status: COMPLETED | OUTPATIENT
Start: 2023-10-24 | End: 2023-10-24

## 2023-10-24 RX ADMIN — METOCLOPRAMIDE 10 MG: 5 INJECTION, SOLUTION INTRAMUSCULAR; INTRAVENOUS at 10:10

## 2023-10-24 RX ADMIN — SODIUM CHLORIDE 1000 ML: 9 INJECTION, SOLUTION INTRAVENOUS at 09:10

## 2023-10-24 NOTE — Clinical Note
"Annalisa"Aurelio Dietrich was seen and treated in our emergency department on 10/24/2023.  She may return to work on 10/26/2023.       If you have any questions or concerns, please don't hesitate to call.      Cathy Cabrera, FNP"

## 2023-10-25 NOTE — FIRST PROVIDER EVALUATION
Emergency Department TeleTriage Encounter Note      CHIEF COMPLAINT    Chief Complaint   Patient presents with    Abdominal Pain     C/o lower abdominal pain cramping since yesterday -vaginal bleeding. Pt is 15 weeks pregnant .        VITAL SIGNS   Initial Vitals [10/24/23 2015]   BP Pulse Resp Temp SpO2   122/62 91 18 98.4 °F (36.9 °C) 100 %      MAP       --            ALLERGIES    Review of patient's allergies indicates:  No Known Allergies    PROVIDER TRIAGE NOTE  This is a teletriage evaluation of a 23 y.o. female presenting to the ED complaining of abdominal cramping, headache, and vomiting.  15 wks pregnant - seen upstairs but sent down to our ED for eval.     Initial orders will be placed and care will be transferred to an alternate provider when patient is roomed for a full evaluation. Any additional orders and the final disposition will be determined by that provider.       ORDERS  Labs Reviewed   CBC W/ AUTO DIFFERENTIAL   COMPREHENSIVE METABOLIC PANEL   LIPASE   URINALYSIS, REFLEX TO URINE CULTURE   POCT URINALYSIS, DIPSTICK OR TABLET REAGENT, AUTOMATED, WITH MICROSCOP       ED Orders (720h ago, onward)      Start Ordered     Status Ordering Provider    10/24/23 2045 10/24/23 2038  sodium chloride 0.9% bolus 1,000 mL 1,000 mL  ED 1 Time         Ordered AMNIATA SINGLETON    10/24/23 2039 10/24/23 2038  Saline lock IV  Once         Ordered AMINATA SINGLETON    10/24/23 2039 10/24/23 2038  CBC auto differential  STAT         Ordered AMINATA SINGLETON    10/24/23 2039 10/24/23 2038  Comprehensive metabolic panel  STAT         Ordered AMINATA SINGLETON    10/24/23 2039 10/24/23 2038  Lipase  STAT         Ordered AMINATA SINGLETON    10/24/23 2039 10/24/23 2038  Urinalysis, Reflex to Urine Culture Urine, Clean Catch  STAT         Ordered AMINATA SINGLETON    10/24/23 2039 10/24/23 2038  Assess fetal heart tones  Until discontinued          Ordered AMINATA SINGLETON    10/24/23 2007 10/24/23 2006  POCT urinalysis, dipstick or tablet reag  Once         Acknowledged HUMBERTO GARSIA              Virtual Visit Note: The provider triage portion of this emergency department evaluation and documentation was performed via [employer], a HIPAA-compliant telemedicine application, in concert with a tele-presenter in the room. A face to face patient evaluation with one of my colleagues will occur once the patient is placed in an emergency department room.      DISCLAIMER: This note was prepared with [employer] voice recognition transcription software. Garbled syntax, mangled pronouns, and other bizarre constructions may be attributed to that software system.
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
## 2023-10-25 NOTE — FIRST PROVIDER EVALUATION
Medical screening examination initiated.  I have conducted a focused provider triage encounter, findings are as follows:    Brief history of present illness:  cramping    Vitals:    10/24/23 2015   BP: 122/62   Pulse: 91   Resp: 18   Temp: 98.4 °F (36.9 °C)   TempSrc: Oral   SpO2: 100%       Pertinent physical exam:  patient resting comfortably, denies vaginal bleeding      Patient seen in [facility], patient stable,  bpm, appropriate for downstairs ED.       Connie Cardona MD  OB/GYN PGY1

## 2023-10-25 NOTE — ED PROVIDER NOTES
Source of History:  Patient    Chief complaint:  Chief Complaint   Patient presents with    Abdominal Pain     C/o lower abdominal pain cramping since yesterday -vaginal bleeding. Pt is 15 weeks pregnant .          HPI:  Annalisa Dietrich is a 23 y.o. female presenting with c/o lower abdominal cramping that began yesterday.  She describes it as intermittent in nature.  Patient is proximally 15 weeks pregnant.  She denies any vaginal bleeding or spotting.  Patient reports minimal oral intake due to her hyperemesis which is unrelieved with Zofran at home.  She does report she was able to keep some fluids down though.  Patient has had a confirmatory ultrasound.  She denies any urinary symptoms or any lower back pain.    This is the extent to the patients complaints today here in the emergency department.    PMH:  As per HPI and below:  Past Medical History:   Diagnosis Date    Anemia affecting pregnancy in first trimester 2023    Postpartum depression 2/10/2020     No past surgical history on file.    Social History     Tobacco Use    Smoking status: Never    Smokeless tobacco: Never   Substance Use Topics    Alcohol use: No    Drug use: No     Review of patient's allergies indicates:  No Known Allergies    ROS: As per HPI and below:  Constitutional: No fever.  No chills.  Eyes: No visual changes.  ENT: No sore throat. No ear pain    Cardiovascular: No chest pain.  Respiratory: No shortness of breath.  GI:  Lower abdominal cramping  Genitourinary:  No vaginal bleeding  Neurologic: No headache. No focal weakness.  No numbness.  MSK: no back pain   Integument: No rashes or lesions.  Hematologic: No easy bruising.  Endocrine: No excessive thirst or urination.    Physical Exam:    /76 (BP Location: Left arm)   Pulse 96   Temp 98.2 °F (36.8 °C) (Oral)   Resp 18   Wt 112.5 kg (248 lb)   SpO2 98%   BMI 36.62 kg/m²   Vitals:    10/24/23 2015 10/24/23 2033   BP: 122/62 126/76   Pulse: 91 96   Resp: 18 18    Temp: 98.4 °F (36.9 °C) 98.2 °F (36.8 °C)   TempSrc: Oral Oral   SpO2: 100% 98%   Weight:  112.5 kg (248 lb)       Nurse's notes vitals reviewed  Constitutional: Patient alert and oriented well-developed well-nourished  Eyes:  Normal conjunctiva.  Extraocular muscles are intact.  ENT: Oral mucosa moist  GI:  Nontender to palpation, bowel sounds normal, no distention or guarding is noted.  Soft to touch.  Musculoskeletal: Normocephalic atraumatic, normal range of motion, no obvious deformities  Skin: Warm and dry no rashes or lesions, no ecchymosis, no erythema  Neuro: alert and oriented x3,  no focal neurological deficits.  Psych: Appropriate, conversant        Labs Reviewed   COMPREHENSIVE METABOLIC PANEL - Abnormal; Notable for the following components:       Result Value    Sodium 135 (*)     CO2 19 (*)     Albumin 3.4 (*)     All other components within normal limits   URINALYSIS, REFLEX TO URINE CULTURE - Abnormal; Notable for the following components:    Color, UA Colorless (*)     All other components within normal limits    Narrative:     Specimen Source->Urine   CBC W/ AUTO DIFFERENTIAL   LIPASE       No orders to display         Initial Impression/ Differential Dx:  Differential Diagnosis includes, but is not limited to:  Pregnancy complication (threatened AB, inevitable AB, incomplete Ab, missed AB, ectopic pregnancy, placenta previa) normal menses, STD, foreign body, trauma.      MDM:    23 y.o. female with lower abdominal cramping that began yesterday which is intermittent in nature, proximally 15 weeks pregnant.  She is had a confirmatory ultrasound.  She denies any vaginal bleeding or dysuria.  UA no evidence of infection.  Labs are grossly unremarkable.  No significant electrolyte abnormalities.  No leukocytosis she has a stable H&H.  Vitals are within normal limits.  Patient has hyperemesis due to her pregnancy reports has been having decreased oral intake.  She was treated with a L of fluid and  Reglan was able tolerate a p.o. challenge in the ED. She reported relief of her abdominal cramping.  Will discharge patient home with multiple antiemetics and discussed needs close follow-up with her OBGYN.  She was agreeable with this plan.  I did discuss if her abdominal pain worsen she develops any fever or saturating 1 pad an hours she may return to emergency department for re-evaluation.  She stated understanding.    ED Course as of 10/24/23 2329   Tue Oct 24, 2023   2153 WBC: 10.68 [AS]   2153 Hemoglobin: 13.3 [AS]   2153 Hematocrit: 39.3 [AS]      ED Course User Index  [AS] Cathy Cabrera FNP       Diagnostic Impression:    1. Hyperemesis gravidarum    2. Abdominal cramping affecting pregnancy         ED Disposition Condition    Discharge Stable            ED Prescriptions       Medication Sig Dispense Start Date End Date Auth. Provider    metoclopramide HCl (REGLAN) 10 MG tablet Take 1 tablet (10 mg total) by mouth every 6 (six) hours. 30 tablet 10/24/2023 -- Cathy Cabrera FNP    promethazine (PHENERGAN) 25 MG suppository Place 1 suppository (25 mg total) rectally every 6 (six) hours as needed for Nausea. 10 suppository 10/24/2023 -- Cathy Cabrera FNP          Follow-up Information       Follow up With Specialties Details Why Contact Info    OBGYN  Schedule an appointment as soon as possible for a visit in 3 days      [facility] - Emergency Dept Emergency Medicine Go to  If symptoms worsen [id number] [id number] [phone]                 Cathy Cabrera FNP  10/24/23 2329

## 2023-10-26 DIAGNOSIS — Z36.89 ENCOUNTER FOR FETAL ANATOMIC SURVEY: Primary | ICD-10-CM

## 2023-10-27 ENCOUNTER — PATIENT OUTREACH (OUTPATIENT)
Dept: EMERGENCY MEDICINE | Facility: OTHER | Age: 23
End: 2023-10-27
Payer: MEDICAID

## 2023-10-27 NOTE — PROGRESS NOTES
Brii Ricks LPN  ED Navigator  Emergency Department    [place]: [facility] [place]  Role: Community Health Worker    Date: 10/27/2023  Patient Name: Annalisa Dietrich  MRN: [id number]  PCP: Devyn Duke MD    Assessment:     Annalisa Dietrich is a 23 y.o. female who has presented to ED for Abdominal Pain. Patient has visited the ED 2 times in the past 3 months. Patient did not contact PCP.     ED Navigator Initial Assessment    ED Navigator Enrollment Documentation  Consent to Services  Does patient consent to completing the assessment?: Yes  Contact  Method of Initial Contact: Phone  Transportation  Does the patient have issues with Transportation?: No  Does the patient have transportation to and from healthcare appointments?: Yes  Insurance Coverage  Do you have coverage/adequate coverage?: Yes  Type/kind of coverage: [employer] ([employer])  Is patient able to afford co-pays/deductibles?: Yes  Is patient able to afford HME or supplies?: Yes  Does patient have an established [facility] PCP?: Yes  Able to access?: Yes  Does the patient have a lack of adequate coverage?: No  Specialist Appointment  Did the patient come to the ED to see a specialist?: Yes  Does the patient have a pending specialist referral?: No  Does the patient have a specialist appointment made?: Yes  Is the patient able to access a timely specialist appointment?: Yes  PCP Follow Up Appointment  Has the patient had an appointment with a primary care provider in the past year?: No  Does the patient have a follow up appontment with a PCP?: No  When was the last time you saw your PCP?: 10/27/22  Why does the patient not have a follow up scheduled?: Other (see comments) (Comment: Following up with OB during pregnancy)  Medications  Is patient able to afford medication?: Yes  Is patient unable to get medication due to lack of transportation?: No  Psychological  Does the patient have psycho-social concerns?: No  Food  Does the patient have concerns  about food?: No  Communication/Education  Does the patient have limited English proficiency/English not primary language?: No  Does patient have low literacy and/or low health literacy?: Yes  Does patient have concerns with care?: No  Does patient have dissatisfaction with care?: No  Other Financial Concerns  Does the patient have immediate financial distress?: No  Does the patient have general financial concerns?: No  Other Social Barriers/Concerns  Does the patient have any additional barriers or concerns?: None  Primary Barrier  Barriers identified: Cognitive barrier (health literacy, language and communication, etc.)  Root Cause of ED Utilization: Patient Knowledge/Low Health Literacy  Plan to address Patient Knowledge/Low Health Literacy: Provided information for [facility] 24/7 Nurse triage line [phone] or 1-866-[facility] ([phone])  Next steps: Provided Education, Scheduled Appointment/Referral  Scheduled Appointment Date: 11/21/23  Appointment Reminder Date: 11/20/23  Was education/educational materials provided surrounding PCP services/creating a medical home?: Yes Was education verbal or written?: Written     Was education/educational materials provided surrounding low cost, healthy foods?: Yes Was education verbal or written?: Written     Was education/educational materials provided surrounding other items? If so, use comment to explain.: Yes (Comment: [place] virtual visit flyer, eating healthy plate, [facility], [facility] on call RN#, [facility] PCP scheduling assistance) Was education verbal or written?: Written   Plan: Provided information for [facility] 24/7 Nurse triage line, [phone] or 1-[facility] ([phone])  Expected Date of Follow Up 1: 11/20/23  Additional Documentation: Spoke with patient today and she was agreeable to enrollment and subsequent F/U calls. Pt. Denies any psychosocial needs at this time. Pt. Has upcoming appointment with OB on 11/21/23. Pt. Denies  smoking. Pt. Denied the need for any outside community resources at this time. Instructed pt to call with any future needs/concerns or scheduling assistance needs and she verbalized understanding. [facility] form, [place] Virtual Visit Flyer, [facility] PCP scheduling assistance, [facility] on call RN#, and Heart Healthy Diet education all sent to e-mail.         Social History     Socioeconomic History    Marital status:    Tobacco Use    Smoking status: Never    Smokeless tobacco: Never   Substance and Sexual Activity    Alcohol use: No    Drug use: No    Sexual activity: Yes     Partners: Male     Birth control/protection: Implant   Social History Narrative    ** Merged History Encounter **         Fob aunt  @ 36 from heart attack     Social Determinants of Health     Financial Resource Strain: Low Risk  (10/27/2023)    Overall Financial Resource Strain (CARDIA)     Difficulty of Paying Living Expenses: Not hard at all   Food Insecurity: No Food Insecurity (10/27/2023)    Hunger Vital Sign     Worried About Running Out of Food in the Last Year: Never true     Ran Out of Food in the Last Year: Never true   Transportation Needs: No Transportation Needs (10/27/2023)    PRAPARE - Transportation     Lack of Transportation (Medical): No     Lack of Transportation (Non-Medical): No   Stress: No Stress Concern Present (10/27/2023)    [employer] - Occupational Stress Questionnaire     Feeling of Stress : Not at all   Social Connections: Unknown (10/27/2023)    Social Connection and Isolation Panel [NHANES]     Marital Status:    Housing Stability: Unknown (10/27/2023)    Housing Stability Vital Sign     Unable to Pay for Housing in the Last Year: No     Unstable Housing in the Last Year: No       Plan:   Spoke with patient today and she was agreeable to enrollment and subsequent F/U calls. Pt. Denies any psychosocial needs at this time. Pt. Has upcoming appointment with OB  on 11/21/23. Pt. Denies smoking. Pt. Denied the need for any outside community resources at this time. Instructed pt to call with any future needs/concerns or scheduling assistance needs and she verbalized understanding. [facility] form, [place] Virtual Visit Flyer, [facility] PCP scheduling assistance, [facility] on call RN#, and Heart Healthy Diet education all sent to e-mail.

## 2023-10-29 ENCOUNTER — PATIENT MESSAGE (OUTPATIENT)
Dept: OTHER | Facility: OTHER | Age: 23
End: 2023-10-29
Payer: MEDICAID

## 2023-11-05 ENCOUNTER — PATIENT MESSAGE (OUTPATIENT)
Dept: OTHER | Facility: OTHER | Age: 23
End: 2023-11-05
Payer: MEDICAID

## 2023-11-20 ENCOUNTER — PATIENT OUTREACH (OUTPATIENT)
Dept: EMERGENCY MEDICINE | Facility: OTHER | Age: 23
End: 2023-11-20
Payer: MEDICAID

## 2023-11-21 ENCOUNTER — HOSPITAL ENCOUNTER (EMERGENCY)
Facility: OTHER | Age: 23
Discharge: HOME | End: 2023-11-21
Attending: OBSTETRICS & GYNECOLOGY
Payer: MEDICAID

## 2023-11-21 ENCOUNTER — ROUTINE PRENATAL (OUTPATIENT)
Dept: OBSTETRICS AND GYNECOLOGY | Facility: CLINIC | Age: 23
End: 2023-11-21
Payer: MEDICAID

## 2023-11-21 ENCOUNTER — PROCEDURE VISIT (OUTPATIENT)
Dept: MATERNAL FETAL MEDICINE | Facility: CLINIC | Age: 23
End: 2023-11-21
Payer: MEDICAID

## 2023-11-21 VITALS
WEIGHT: 246.81 LBS | BODY MASS INDEX: 36.45 KG/M2 | SYSTOLIC BLOOD PRESSURE: 122 MMHG | DIASTOLIC BLOOD PRESSURE: 74 MMHG

## 2023-11-21 VITALS
TEMPERATURE: 98 F | RESPIRATION RATE: 18 BRPM | HEART RATE: 80 BPM | SYSTOLIC BLOOD PRESSURE: 124 MMHG | DIASTOLIC BLOOD PRESSURE: 68 MMHG | OXYGEN SATURATION: 100 %

## 2023-11-21 DIAGNOSIS — Z34.90 PREGNANCY, UNSPECIFIED GESTATIONAL AGE: Primary | ICD-10-CM

## 2023-11-21 DIAGNOSIS — O99.211 OBESITY AFFECTING PREGNANCY IN FIRST TRIMESTER, UNSPECIFIED OBESITY TYPE: ICD-10-CM

## 2023-11-21 DIAGNOSIS — Z36.89 ENCOUNTER FOR FETAL ANATOMIC SURVEY: ICD-10-CM

## 2023-11-21 DIAGNOSIS — R11.0 NAUSEA: Primary | ICD-10-CM

## 2023-11-21 DIAGNOSIS — Z3A.19 19 WEEKS GESTATION OF PREGNANCY: ICD-10-CM

## 2023-11-21 DIAGNOSIS — R11.10 HYPEREMESIS: ICD-10-CM

## 2023-11-21 DIAGNOSIS — Z36.2 ENCOUNTER FOR FOLLOW-UP ULTRASOUND OF FETAL ANATOMY: Primary | ICD-10-CM

## 2023-11-21 LAB
ALBUMIN SERPL BCP-MCNC: 3.2 G/DL (ref 3.5–5.2)
ALP SERPL-CCNC: 66 U/L (ref 55–135)
ALT SERPL W/O P-5'-P-CCNC: 15 U/L (ref 10–44)
ANION GAP SERPL CALC-SCNC: 9 MMOL/L (ref 8–16)
AST SERPL-CCNC: 14 U/L (ref 10–40)
BASOPHILS # BLD AUTO: 0.04 K/UL (ref 0–0.2)
BASOPHILS NFR BLD: 0.4 % (ref 0–1.9)
BILIRUB SERPL-MCNC: 0.2 MG/DL (ref 0.1–1)
BILIRUB SERPL-MCNC: NORMAL MG/DL
BLOOD URINE, POC: NORMAL
BUN SERPL-MCNC: 4 MG/DL (ref 6–20)
CALCIUM SERPL-MCNC: 9 MG/DL (ref 8.7–10.5)
CHLORIDE SERPL-SCNC: 107 MMOL/L (ref 95–110)
CO2 SERPL-SCNC: 20 MMOL/L (ref 23–29)
COLOR, POC UA: NORMAL
CREAT SERPL-MCNC: 0.6 MG/DL (ref 0.5–1.4)
DIFFERENTIAL METHOD: ABNORMAL
EOSINOPHIL # BLD AUTO: 0.1 K/UL (ref 0–0.5)
EOSINOPHIL NFR BLD: 1.2 % (ref 0–8)
ERYTHROCYTE [DISTWIDTH] IN BLOOD BY AUTOMATED COUNT: 13.2 % (ref 11.5–14.5)
EST. GFR  (NO RACE VARIABLE): >60 ML/MIN/1.73 M^2
GLUCOSE SERPL-MCNC: 78 MG/DL (ref 70–110)
GLUCOSE UR QL STRIP: NORMAL
HCT VFR BLD AUTO: 37.4 % (ref 37–48.5)
HGB BLD-MCNC: 12.5 G/DL (ref 12–16)
IMM GRANULOCYTES # BLD AUTO: 0.05 K/UL (ref 0–0.04)
IMM GRANULOCYTES NFR BLD AUTO: 0.6 % (ref 0–0.5)
KETONES UR QL STRIP: NORMAL
LEUKOCYTE ESTERASE URINE, POC: NORMAL
LYMPHOCYTES # BLD AUTO: 1.9 K/UL (ref 1–4.8)
LYMPHOCYTES NFR BLD: 20.8 % (ref 18–48)
MAGNESIUM SERPL-MCNC: 1.8 MG/DL (ref 1.6–2.6)
MCH RBC QN AUTO: 29.4 PG (ref 27–31)
MCHC RBC AUTO-ENTMCNC: 33.4 G/DL (ref 32–36)
MCV RBC AUTO: 88 FL (ref 82–98)
MONOCYTES # BLD AUTO: 0.5 K/UL (ref 0.3–1)
MONOCYTES NFR BLD: 5.3 % (ref 4–15)
NEUTROPHILS # BLD AUTO: 6.5 K/UL (ref 1.8–7.7)
NEUTROPHILS NFR BLD: 71.7 % (ref 38–73)
NITRITE, POC UA: NORMAL
NRBC BLD-RTO: 0 /100 WBC
PH, POC UA: NORMAL
PHOSPHATE SERPL-MCNC: 2.8 MG/DL (ref 2.7–4.5)
PLATELET # BLD AUTO: 312 K/UL (ref 150–450)
PMV BLD AUTO: 9.7 FL (ref 9.2–12.9)
POTASSIUM SERPL-SCNC: 3.9 MMOL/L (ref 3.5–5.1)
PROT SERPL-MCNC: 6.9 G/DL (ref 6–8.4)
PROTEIN, POC: NORMAL
RBC # BLD AUTO: 4.25 M/UL (ref 4–5.4)
SODIUM SERPL-SCNC: 136 MMOL/L (ref 136–145)
SPECIFIC GRAVITY, POC UA: NORMAL
UROBILINOGEN, POC UA: NORMAL
WBC # BLD AUTO: 9.06 K/UL (ref 3.9–12.7)

## 2023-11-21 PROCEDURE — 25000003 PHARM REV CODE 250: Performed by: OBSTETRICS & GYNECOLOGY

## 2023-11-21 PROCEDURE — 99284 EMERGENCY DEPT VISIT MOD MDM: CPT | Mod: ,,, | Performed by: OBSTETRICS & GYNECOLOGY

## 2023-11-21 PROCEDURE — 25000003 PHARM REV CODE 250

## 2023-11-21 PROCEDURE — 99213 OFFICE O/P EST LOW 20 MIN: CPT | Mod: PBBFAC,TH,25

## 2023-11-21 PROCEDURE — 76811 OB US DETAILED SNGL FETUS: CPT | Mod: PBBFAC | Performed by: OBSTETRICS & GYNECOLOGY

## 2023-11-21 PROCEDURE — 84100 ASSAY OF PHOSPHORUS: CPT

## 2023-11-21 PROCEDURE — 85025 COMPLETE CBC W/AUTO DIFF WBC: CPT

## 2023-11-21 PROCEDURE — 99284 PR EMERGENCY DEPT VISIT,LEVEL IV: ICD-10-PCS | Mod: ,,, | Performed by: OBSTETRICS & GYNECOLOGY

## 2023-11-21 PROCEDURE — 83735 ASSAY OF MAGNESIUM: CPT

## 2023-11-21 PROCEDURE — 99284 EMERGENCY DEPT VISIT MOD MDM: CPT | Mod: 25,27

## 2023-11-21 PROCEDURE — 99214 PR OFFICE/OUTPT VISIT, EST, LEVL IV, 30-39 MIN: ICD-10-PCS | Mod: TH,S$PBB,, | Performed by: OBSTETRICS & GYNECOLOGY

## 2023-11-21 PROCEDURE — 99214 OFFICE O/P EST MOD 30 MIN: CPT | Mod: TH,S$PBB,, | Performed by: OBSTETRICS & GYNECOLOGY

## 2023-11-21 PROCEDURE — 99999 PR PBB SHADOW E&M-EST. PATIENT-LVL III: CPT | Mod: PBBFAC,,,

## 2023-11-21 PROCEDURE — 76811 US MFM PROCEDURE (VIEWPOINT): ICD-10-PCS | Mod: 26,S$PBB,, | Performed by: OBSTETRICS & GYNECOLOGY

## 2023-11-21 PROCEDURE — 99999 PR PBB SHADOW E&M-EST. PATIENT-LVL III: ICD-10-PCS | Mod: PBBFAC,,,

## 2023-11-21 PROCEDURE — 80053 COMPREHEN METABOLIC PANEL: CPT

## 2023-11-21 PROCEDURE — 81002 URINALYSIS NONAUTO W/O SCOPE: CPT

## 2023-11-21 RX ORDER — PYRIDOXINE HCL (VITAMIN B6) 25 MG
25 TABLET ORAL NIGHTLY
Qty: 40 TABLET | Refills: 2 | Status: SHIPPED | OUTPATIENT
Start: 2023-11-21

## 2023-11-21 RX ORDER — FAMOTIDINE 40 MG/1
40 TABLET, FILM COATED ORAL DAILY
Qty: 30 TABLET | Refills: 5 | Status: SHIPPED | OUTPATIENT
Start: 2023-11-21 | End: 2024-11-20

## 2023-11-21 RX ORDER — ONDANSETRON 8 MG/1
8 TABLET, ORALLY DISINTEGRATING ORAL ONCE
Status: COMPLETED | OUTPATIENT
Start: 2023-11-21 | End: 2023-11-21

## 2023-11-21 RX ORDER — FAMOTIDINE 20 MG/1
40 TABLET, FILM COATED ORAL ONCE
Status: COMPLETED | OUTPATIENT
Start: 2023-11-21 | End: 2023-11-21

## 2023-11-21 RX ADMIN — FAMOTIDINE 40 MG: 20 TABLET, FILM COATED ORAL at 10:11

## 2023-11-21 RX ADMIN — ONDANSETRON 8 MG: 8 TABLET, ORALLY DISINTEGRATING ORAL at 10:11

## 2023-11-21 NOTE — PROGRESS NOTES
Reason for Visit   Routine Prenatal Visit    23 y.o.,  at 19 + 2/7 weeks, doing well. No lof/brvb, dysuria, fever/chills, or abdominal pain/cramps. + FM noted.     TWG: -5 lbs   Allergies: Patient has no known allergies.    PHYSICAL EXAM  GENERAL: No acute distress  HEENT: Normocephalic, atruamatic  NEURO: Alert and oriented x3  PSYCH: Calm, normal mood and affect  PULMONARY: Non-labored respiration; no tachypnea  ABD: Soft, gravid, nontender  FH = umbilicus  Fhts 150s    ASSESSMENT AND PLAN  Pregnancy, unspecified gestational age    History of postpartum depression    Obesity affecting pregnancy in first trimester, unspecified obesity type    19 weeks gestation of pregnancy  -     Maternal Screen AFP (Single Marker); Future; Expected date: 2023          Continue daily PNV and low dose ASA.        Anatomy US today, report pending.        S&S of PTL/PPOM reinforced.   Reviewed exercise/diet recommendations in pregnancy.  Confirmed after-hours number given to patient.  AFP today, discussed and desires.  Discussed FLU vaccine - does not desire today. Will consider at next visit.  Remains ABC candidate at this time.    Follow-up: 4 weeks, call or present sooner ANNIKA Stevens CNM, APRN

## 2023-11-21 NOTE — DISCHARGE INSTRUCTIONS
Call clinic [phone] or L & D after hours at [phone] for vaginal bleeding, leakage of fluids, contractions 4-5 in one hour, decreased fetal movements ( 10 kicks in 2 hours), headache not relieved by Tylenol, blurry vision, or temp of 100.4 or greater.  Begin doing fetal kick counts, at least 10 movements in 2 hours starting at 28 weeks gestation.  Keep next clinic appointment

## 2023-11-21 NOTE — ED PROVIDER NOTES
Encounter Date: 2023       History     Chief Complaint   Patient presents with    Nausea     HPI  Annalisa Dietrich is a 23 y.o. [id number] at 19w2d presents complaining of nausea. The patient report she has been diagnosed with hyperemesis gravidum and is on antiemetic regimen: zofran, phenergan and reglan. She was at a routine prenatal appointment and the patient reported having nausea and was instructed to be go to the ED to be evaluated. The patient reports having 3-4 episodes daily, usually after meals. Her last emetic episode was last night.     This IUP is complicated by HG, obesity, hx PPD, hx migraines.  Patient denies contractions, denies vaginal bleeding, denies LOF.   Fetal Movement:  not yet .     Review of patient's allergies indicates:  No Known Allergies  Past Medical History:   Diagnosis Date    Anemia affecting pregnancy in first trimester 2023    Postpartum depression 2/10/2020     No past surgical history on file.  Family History   Problem Relation Age of Onset    Cancer Maternal Grandmother     Hypertension Maternal Grandmother     Hypertension Mother     Arrhythmia Neg Hx     Cardiomyopathy Neg Hx     Congenital heart disease Neg Hx     Heart attacks under age 50 Neg Hx     Pacemaker/defibrilator Neg Hx      Social History     Tobacco Use    Smoking status: Never    Smokeless tobacco: Never   Substance Use Topics    Alcohol use: No    Drug use: No     Review of Systems   Constitutional:  Negative for chills, fatigue and fever.   HENT:  Negative for congestion, sinus pain, sore throat and voice change.    Eyes:  Negative for photophobia and visual disturbance.   Respiratory:  Negative for chest tightness and shortness of breath.    Cardiovascular:  Negative for chest pain.   Gastrointestinal:  Positive for abdominal pain and nausea. Negative for constipation, diarrhea and vomiting.   Musculoskeletal:  Negative for back pain.   Neurological:  Negative for weakness, light-headedness and  headaches.   Psychiatric/Behavioral:  Negative for behavioral problems, confusion, decreased concentration, dysphoric mood and hallucinations.        Physical Exam     Initial Vitals   BP Pulse Resp Temp SpO2   11/21/23 0952 11/21/23 0952 11/21/23 0955 11/21/23 0955 11/21/23 0955   119/73 72 18 97.9 °F (36.6 °C) 99 %      MAP       --                Physical Exam    Vitals reviewed.  Constitutional: She appears well-developed and well-nourished. No distress.   HENT:   Head: Normocephalic and atraumatic.   Eyes: EOM are normal.   Neck:   Normal range of motion.  Cardiovascular:  Normal rate.           Pulmonary/Chest: No respiratory distress. She exhibits no tenderness.   Abdominal: Abdomen is soft. There is no abdominal tenderness. There is no rebound and no guarding.   Musculoskeletal:         General: Normal range of motion.      Cervical back: Normal range of motion.     Neurological: She is alert and oriented to person, place, and time.   Skin: Skin is warm and dry.   Psychiatric: She has a normal mood and affect. Her behavior is normal. Judgment and thought content normal.         ED Course   Procedures  FHT: +++ (150 bpm)    Labs Reviewed   CBC W/ AUTO DIFFERENTIAL   COMPREHENSIVE METABOLIC PANEL   MAGNESIUM   PHOSPHORUS   POCT URINALYSIS, DIPSTICK OR TABLET REAGENT, AUTOMATED, WITH MICROSCOP          Imaging Results    None          Medications   famotidine tablet 40 mg (has no administration in time range)   ondansetron disintegrating tablet 8 mg (8 mg Oral Given 11/21/23 1014)     Medical Decision Making  Nausea  - diagnosed with HG in this pregnancy   - VSS and within normal limits  - CBC, CMP, Mag, Phos normal   - urine dipstick: negative   - PO hydration and disintegrating zofran  - Continue antiemetic regimen. Will add Vit B6/doxylamine and famotidine. Sent to preferred pharmacy.  - return to [facility] with: new onset ctx in regular pattern, new onset bleeding or LOF per vagina, decreased fetal movement,  persistent nausea/vomiting, new onset severe abdominal pain  - return to primary OBGYN clinic for prenatal appointments as scheduled    Patient stable for discharge.         Amount and/or Complexity of Data Reviewed  Labs: ordered.    Risk  OTC drugs.  Prescription drug management.              Attending Attestation:   Physician Attestation Statement for Resident:  As the supervising MD   Physician Attestation Statement: I have personally seen and examined this patient.   I agree with the above history.  -:   As the supervising MD I agree with the above PE.     As the supervising MD I agree with the above treatment, course, plan, and disposition.   -: +FHTs.  No acute changes to nausea and vomiting of pregnancy that has been ongoing.  Sent from prenatal appt and no vomiting today so far. Pepcid added to regimen and Vit B6/doxalyamine added as well.  Normal labs.  No other new symptoms to address.  I was personally present during the critical portions of the procedure(s) performed by the resident and was immediately available in the ED to provide services and assistance as needed during the entire procedure.   I have reviewed the following: old records at this facility.                                    Clinical Impression:  Final diagnoses:  [R11.0] Nausea (Primary)  [R11.10] Hyperemesis  [Z3A.19] 19 weeks gestation of pregnancy                 Marivel Sorensen MD  11/21/23 4273

## 2023-12-14 ENCOUNTER — PATIENT OUTREACH (OUTPATIENT)
Dept: EMERGENCY MEDICINE | Facility: OTHER | Age: 23
End: 2023-12-14
Payer: MEDICAID

## 2023-12-22 PROBLEM — K52.9 ACUTE GASTROENTERITIS: Status: RESOLVED | Noted: 2018-05-10 | Resolved: 2023-12-22

## 2023-12-22 PROBLEM — O99.212 OBESITY AFFECTING PREGNANCY IN SECOND TRIMESTER: Status: ACTIVE | Noted: 2023-08-29

## 2023-12-22 PROBLEM — A08.4 VIRAL GASTROENTERITIS: Status: RESOLVED | Noted: 2018-05-10 | Resolved: 2023-12-22

## 2023-12-24 ENCOUNTER — PATIENT MESSAGE (OUTPATIENT)
Dept: OTHER | Facility: OTHER | Age: 23
End: 2023-12-24
Payer: MEDICAID

## 2023-12-26 ENCOUNTER — PATIENT MESSAGE (OUTPATIENT)
Dept: MATERNAL FETAL MEDICINE | Facility: CLINIC | Age: 23
End: 2023-12-26
Payer: MEDICAID

## 2023-12-27 ENCOUNTER — PROCEDURE VISIT (OUTPATIENT)
Dept: MATERNAL FETAL MEDICINE | Facility: CLINIC | Age: 23
End: 2023-12-27
Payer: MEDICAID

## 2023-12-27 DIAGNOSIS — Z36.2 ENCOUNTER FOR FOLLOW-UP ULTRASOUND OF FETAL ANATOMY: ICD-10-CM

## 2023-12-27 PROCEDURE — 76816 US MFM PROCEDURE (VIEWPOINT): ICD-10-PCS | Mod: 26,S$PBB,, | Performed by: OBSTETRICS & GYNECOLOGY

## 2023-12-27 PROCEDURE — 76816 OB US FOLLOW-UP PER FETUS: CPT | Mod: PBBFAC | Performed by: OBSTETRICS & GYNECOLOGY

## 2024-01-03 ENCOUNTER — LAB VISIT (OUTPATIENT)
Dept: LAB | Facility: OTHER | Age: 24
End: 2024-01-03
Attending: ADVANCED PRACTICE MIDWIFE
Payer: MEDICAID

## 2024-01-03 ENCOUNTER — ROUTINE PRENATAL (OUTPATIENT)
Dept: OBSTETRICS AND GYNECOLOGY | Facility: CLINIC | Age: 24
End: 2024-01-03
Payer: MEDICAID

## 2024-01-03 VITALS
WEIGHT: 245.5 LBS | SYSTOLIC BLOOD PRESSURE: 113 MMHG | HEART RATE: 110 BPM | BODY MASS INDEX: 36.25 KG/M2 | DIASTOLIC BLOOD PRESSURE: 65 MMHG

## 2024-01-03 DIAGNOSIS — O99.212 OBESITY AFFECTING PREGNANCY IN SECOND TRIMESTER, UNSPECIFIED OBESITY TYPE: ICD-10-CM

## 2024-01-03 DIAGNOSIS — O99.012 ANEMIA AFFECTING PREGNANCY IN SECOND TRIMESTER: Primary | ICD-10-CM

## 2024-01-03 DIAGNOSIS — Z3A.25 25 WEEKS GESTATION OF PREGNANCY: ICD-10-CM

## 2024-01-03 DIAGNOSIS — O99.212 OBESITY AFFECTING PREGNANCY IN SECOND TRIMESTER, UNSPECIFIED OBESITY TYPE: Primary | ICD-10-CM

## 2024-01-03 LAB
BASOPHILS # BLD AUTO: 0.04 K/UL (ref 0–0.2)
BASOPHILS NFR BLD: 0.4 % (ref 0–1.9)
DIFFERENTIAL METHOD BLD: ABNORMAL
EOSINOPHIL # BLD AUTO: 0.1 K/UL (ref 0–0.5)
EOSINOPHIL NFR BLD: 0.9 % (ref 0–8)
ERYTHROCYTE [DISTWIDTH] IN BLOOD BY AUTOMATED COUNT: 12.7 % (ref 11.5–14.5)
GLUCOSE SERPL-MCNC: 126 MG/DL (ref 70–140)
HCT VFR BLD AUTO: 34.6 % (ref 37–48.5)
HGB BLD-MCNC: 11.4 G/DL (ref 12–16)
IMM GRANULOCYTES # BLD AUTO: 0.07 K/UL (ref 0–0.04)
IMM GRANULOCYTES NFR BLD AUTO: 0.7 % (ref 0–0.5)
LYMPHOCYTES # BLD AUTO: 1.6 K/UL (ref 1–4.8)
LYMPHOCYTES NFR BLD: 15.3 % (ref 18–48)
MCH RBC QN AUTO: 30 PG (ref 27–31)
MCHC RBC AUTO-ENTMCNC: 32.9 G/DL (ref 32–36)
MCV RBC AUTO: 91 FL (ref 82–98)
MONOCYTES # BLD AUTO: 0.5 K/UL (ref 0.3–1)
MONOCYTES NFR BLD: 4.5 % (ref 4–15)
NEUTROPHILS # BLD AUTO: 8.1 K/UL (ref 1.8–7.7)
NEUTROPHILS NFR BLD: 78.2 % (ref 38–73)
NRBC BLD-RTO: 0 /100 WBC
PLATELET # BLD AUTO: 277 K/UL (ref 150–450)
PMV BLD AUTO: 10.2 FL (ref 9.2–12.9)
RBC # BLD AUTO: 3.8 M/UL (ref 4–5.4)
WBC # BLD AUTO: 10.28 K/UL (ref 3.9–12.7)

## 2024-01-03 PROCEDURE — 99213 OFFICE O/P EST LOW 20 MIN: CPT | Mod: S$PBB,TH,, | Performed by: ADVANCED PRACTICE MIDWIFE

## 2024-01-03 PROCEDURE — 36415 COLL VENOUS BLD VENIPUNCTURE: CPT | Performed by: ADVANCED PRACTICE MIDWIFE

## 2024-01-03 PROCEDURE — 82950 GLUCOSE TEST: CPT | Performed by: ADVANCED PRACTICE MIDWIFE

## 2024-01-03 PROCEDURE — 85025 COMPLETE CBC W/AUTO DIFF WBC: CPT | Performed by: ADVANCED PRACTICE MIDWIFE

## 2024-01-03 PROCEDURE — 99999 PR PBB SHADOW E&M-EST. PATIENT-LVL III: CPT | Mod: PBBFAC,,, | Performed by: ADVANCED PRACTICE MIDWIFE

## 2024-01-03 PROCEDURE — 99213 OFFICE O/P EST LOW 20 MIN: CPT | Mod: PBBFAC,TH | Performed by: ADVANCED PRACTICE MIDWIFE

## 2024-01-03 RX ORDER — FERROUS SULFATE 325(65) MG
325 TABLET ORAL EVERY OTHER DAY
Qty: 30 TABLET | Refills: 4 | Status: SHIPPED | OUTPATIENT
Start: 2024-01-03

## 2024-01-03 NOTE — PROGRESS NOTES
Routine Prenatal Visit    23 y.o. female  at 25w3d, by Estimated Date of Delivery: 24    Complaints today: none. Doing well today.   Denies UCs, LOF, or VB. Reports daily FM.    Reviewed TWG: -11 lbs    PHYSICAL EXAM  /65   Pulse 110   Wt 111.4 kg (245 lb 7.7 oz)   BMI 36.25 kg/m²     GENERAL: No acute distress  HEENT: Normocephalic, atraumatic  NEURO: Alert and oriented x3  PSYCH: Normal mood and affect  PULMONARY: Non-labored respiration; no tachypnea  ABD: Soft, gravid, nontender      ASSESSMENT AND PLAN  Obesity affecting pregnancy in second trimester, unspecified obesity type  -     OB GLUCOSE SCREEN; Future; Expected date: 2024  -     CBC W/ AUTO DIFFERENTIAL; Future; Expected date: 2024    25 weeks gestation of pregnancy        Reviewed upcoming 28wk labs, (A POS) and orders placed  Education regarding warning signs of PreEclampsia, reviewed normal FM,  labor precautions, and how/when to call.    Follow-up: 4 weeks, call or present sooner ANNIKA Berkowitz CNM, APRN

## 2024-01-07 ENCOUNTER — PATIENT MESSAGE (OUTPATIENT)
Dept: OTHER | Facility: OTHER | Age: 24
End: 2024-01-07
Payer: MEDICAID

## 2024-01-21 ENCOUNTER — PATIENT MESSAGE (OUTPATIENT)
Dept: OTHER | Facility: OTHER | Age: 24
End: 2024-01-21
Payer: MEDICAID

## 2024-01-24 ENCOUNTER — CLINICAL SUPPORT (OUTPATIENT)
Dept: OBSTETRICS AND GYNECOLOGY | Facility: CLINIC | Age: 24
End: 2024-01-24
Payer: MEDICAID

## 2024-01-24 ENCOUNTER — ROUTINE PRENATAL (OUTPATIENT)
Dept: OBSTETRICS AND GYNECOLOGY | Facility: CLINIC | Age: 24
End: 2024-01-24
Payer: MEDICAID

## 2024-01-24 VITALS
DIASTOLIC BLOOD PRESSURE: 62 MMHG | WEIGHT: 243.06 LBS | SYSTOLIC BLOOD PRESSURE: 126 MMHG | BODY MASS INDEX: 35.89 KG/M2

## 2024-01-24 DIAGNOSIS — O99.212 OBESITY AFFECTING PREGNANCY IN SECOND TRIMESTER, UNSPECIFIED OBESITY TYPE: ICD-10-CM

## 2024-01-24 DIAGNOSIS — Z3A.28 28 WEEKS GESTATION OF PREGNANCY: Primary | ICD-10-CM

## 2024-01-24 DIAGNOSIS — Z3A.23 23 WEEKS GESTATION OF PREGNANCY: Primary | ICD-10-CM

## 2024-01-24 PROCEDURE — 99999PBSHW TDAP VACCINE GREATER THAN OR EQUAL TO 7YO IM: Mod: PBBFAC,,,

## 2024-01-24 PROCEDURE — 99999 PR PBB SHADOW E&M-EST. PATIENT-LVL I: CPT | Mod: PBBFAC,,,

## 2024-01-24 PROCEDURE — 99999 PR PBB SHADOW E&M-EST. PATIENT-LVL III: CPT | Mod: PBBFAC,,,

## 2024-01-24 PROCEDURE — 99213 OFFICE O/P EST LOW 20 MIN: CPT | Mod: PBBFAC,TH

## 2024-01-24 PROCEDURE — 99211 OFF/OP EST MAY X REQ PHY/QHP: CPT | Mod: PBBFAC,27

## 2024-01-24 PROCEDURE — 90471 IMMUNIZATION ADMIN: CPT | Mod: PBBFAC

## 2024-01-24 PROCEDURE — 99214 OFFICE O/P EST MOD 30 MIN: CPT | Mod: TH,S$PBB,, | Performed by: OBSTETRICS & GYNECOLOGY

## 2024-01-24 NOTE — PROGRESS NOTES
23 y.o. female  at 28w3d by Estimated Date of Delivery: 24    Complaints today: None  Denies UCs, LOF, VB or S&S of pre eclampsia. Reports gooed fetal movement.    Reviewed TWlbs    PHYSICAL EXAM  /62   Wt 110.2 kg (243 lb 0.9 oz)   BMI 35.89 kg/m²     GENERAL: No acute distress  HEENT: Normocephalic, atraumatic  NEURO: Alert and oriented x3  PULMONARY: Non-labored respiration; no tachypnea  ABD: Soft, gravid, nontender.    ASSESSMENT AND PLAN  23 weeks gestation of pregnancy    Obesity affecting pregnancy in second trimester, unspecified obesity type    Other orders  -     (In Office Administered) Tdap Vaccine      28 week labs, 1 hour gtt/cbc reviewed and discussed, wnl.  Blood type: (A POS). Rhogam not indicated.  Education regarding CDC recommendations for Tdap in pregnancy. Patient desires.. Order placed, she will get done today.    Reviewed warning signs, normal FKCs,  labor precautions and how/when to call.    Follow-up:3  weeks, call or present sooner PRN.  Desires ABC tour next visit. Would like water birth.    Florinda Stevens, MARK, APRN

## 2024-01-30 ENCOUNTER — HOSPITAL ENCOUNTER (EMERGENCY)
Facility: OTHER | Age: 24
Discharge: HOME | End: 2024-01-30
Attending: OBSTETRICS & GYNECOLOGY
Payer: MEDICAID

## 2024-01-30 ENCOUNTER — HOSPITAL ENCOUNTER (EMERGENCY)
Facility: OTHER | Age: 24
Discharge: HOME | End: 2024-01-31
Attending: OBSTETRICS & GYNECOLOGY
Payer: MEDICAID

## 2024-01-30 VITALS
TEMPERATURE: 98 F | SYSTOLIC BLOOD PRESSURE: 84 MMHG | HEART RATE: 75 BPM | OXYGEN SATURATION: 100 % | DIASTOLIC BLOOD PRESSURE: 48 MMHG | RESPIRATION RATE: 18 BRPM

## 2024-01-30 DIAGNOSIS — Z3A.29 29 WEEKS GESTATION OF PREGNANCY: ICD-10-CM

## 2024-01-30 DIAGNOSIS — R68.89 CONGESTION OF THROAT: ICD-10-CM

## 2024-01-30 DIAGNOSIS — R11.2 NAUSEA AND VOMITING, UNSPECIFIED VOMITING TYPE: Primary | ICD-10-CM

## 2024-01-30 DIAGNOSIS — R00.0 TACHYCARDIA: ICD-10-CM

## 2024-01-30 LAB
ALBUMIN SERPL BCP-MCNC: 2.8 G/DL (ref 3.5–5.2)
ALP SERPL-CCNC: 104 U/L (ref 55–135)
ALT SERPL W/O P-5'-P-CCNC: 23 U/L (ref 10–44)
ANION GAP SERPL CALC-SCNC: 10 MMOL/L (ref 8–16)
AST SERPL-CCNC: 25 U/L (ref 10–40)
BASOPHILS # BLD AUTO: 0.03 K/UL (ref 0–0.2)
BASOPHILS NFR BLD: 0.4 % (ref 0–1.9)
BILIRUB SERPL-MCNC: 0.3 MG/DL (ref 0.1–1)
BILIRUB SERPL-MCNC: NORMAL MG/DL
BLOOD URINE, POC: NORMAL
BUN SERPL-MCNC: 5 MG/DL (ref 6–20)
CALCIUM SERPL-MCNC: 9.3 MG/DL (ref 8.7–10.5)
CHLORIDE SERPL-SCNC: 106 MMOL/L (ref 95–110)
CO2 SERPL-SCNC: 24 MMOL/L (ref 23–29)
COLOR, POC UA: NORMAL
CREAT SERPL-MCNC: 0.7 MG/DL (ref 0.5–1.4)
DIFFERENTIAL METHOD BLD: ABNORMAL
EOSINOPHIL # BLD AUTO: 0.2 K/UL (ref 0–0.5)
EOSINOPHIL NFR BLD: 1.9 % (ref 0–8)
ERYTHROCYTE [DISTWIDTH] IN BLOOD BY AUTOMATED COUNT: 12.8 % (ref 11.5–14.5)
EST. GFR  (NO RACE VARIABLE): >60 ML/MIN/1.73 M^2
GLUCOSE SERPL-MCNC: 89 MG/DL (ref 70–110)
GLUCOSE UR QL STRIP: NORMAL
GROUP A STREP, MOLECULAR: NEGATIVE
HCT VFR BLD AUTO: 34.3 % (ref 37–48.5)
HGB BLD-MCNC: 11.5 G/DL (ref 12–16)
IMM GRANULOCYTES # BLD AUTO: 0.06 K/UL (ref 0–0.04)
IMM GRANULOCYTES NFR BLD AUTO: 0.8 % (ref 0–0.5)
INFLUENZA A, MOLECULAR: NEGATIVE
INFLUENZA B, MOLECULAR: NEGATIVE
KETONES UR QL STRIP: NORMAL
LEUKOCYTE ESTERASE URINE, POC: NORMAL
LYMPHOCYTES # BLD AUTO: 1.4 K/UL (ref 1–4.8)
LYMPHOCYTES NFR BLD: 18 % (ref 18–48)
MCH RBC QN AUTO: 29.5 PG (ref 27–31)
MCHC RBC AUTO-ENTMCNC: 33.5 G/DL (ref 32–36)
MCV RBC AUTO: 88 FL (ref 82–98)
MONOCYTES # BLD AUTO: 0.6 K/UL (ref 0.3–1)
MONOCYTES NFR BLD: 8 % (ref 4–15)
NEUTROPHILS # BLD AUTO: 5.5 K/UL (ref 1.8–7.7)
NEUTROPHILS NFR BLD: 70.9 % (ref 38–73)
NITRITE, POC UA: NORMAL
NRBC BLD-RTO: 0 /100 WBC
PH, POC UA: 7
PLATELET # BLD AUTO: 316 K/UL (ref 150–450)
PMV BLD AUTO: 9.9 FL (ref 9.2–12.9)
POTASSIUM SERPL-SCNC: 3.9 MMOL/L (ref 3.5–5.1)
PROT SERPL-MCNC: 6.8 G/DL (ref 6–8.4)
PROTEIN, POC: NORMAL
RBC # BLD AUTO: 3.9 M/UL (ref 4–5.4)
SARS-COV-2 RDRP RESP QL NAA+PROBE: NEGATIVE
SODIUM SERPL-SCNC: 140 MMOL/L (ref 136–145)
SPECIFIC GRAVITY, POC UA: 1.01
SPECIMEN SOURCE: NORMAL
UROBILINOGEN, POC UA: NORMAL
WBC # BLD AUTO: 7.72 K/UL (ref 3.9–12.7)

## 2024-01-30 PROCEDURE — 80053 COMPREHEN METABOLIC PANEL: CPT

## 2024-01-30 PROCEDURE — 25000003 PHARM REV CODE 250

## 2024-01-30 PROCEDURE — 85025 COMPLETE CBC W/AUTO DIFF WBC: CPT

## 2024-01-30 PROCEDURE — 99284 EMERGENCY DEPT VISIT MOD MDM: CPT | Mod: 25

## 2024-01-30 PROCEDURE — 93010 ELECTROCARDIOGRAM REPORT: CPT | Mod: ,,, | Performed by: INTERNAL MEDICINE

## 2024-01-30 PROCEDURE — 99284 EMERGENCY DEPT VISIT MOD MDM: CPT | Mod: 25,,, | Performed by: OBSTETRICS & GYNECOLOGY

## 2024-01-30 PROCEDURE — 59025 FETAL NON-STRESS TEST: CPT | Mod: 26,76,, | Performed by: OBSTETRICS & GYNECOLOGY

## 2024-01-30 PROCEDURE — 81002 URINALYSIS NONAUTO W/O SCOPE: CPT

## 2024-01-30 PROCEDURE — 93005 ELECTROCARDIOGRAM TRACING: CPT

## 2024-01-30 PROCEDURE — 99284 EMERGENCY DEPT VISIT MOD MDM: CPT | Mod: 25,27

## 2024-01-30 PROCEDURE — 59025 FETAL NON-STRESS TEST: CPT

## 2024-01-30 PROCEDURE — 87651 STREP A DNA AMP PROBE: CPT

## 2024-01-30 PROCEDURE — 99499 UNLISTED E&M SERVICE: CPT | Mod: ,,, | Performed by: OBSTETRICS & GYNECOLOGY

## 2024-01-30 PROCEDURE — 59025 FETAL NON-STRESS TEST: CPT | Mod: 76

## 2024-01-30 PROCEDURE — 87502 INFLUENZA DNA AMP PROBE: CPT

## 2024-01-30 PROCEDURE — 96375 TX/PRO/DX INJ NEW DRUG ADDON: CPT

## 2024-01-30 PROCEDURE — 96365 THER/PROPH/DIAG IV INF INIT: CPT

## 2024-01-30 PROCEDURE — 59025 FETAL NON-STRESS TEST: CPT | Mod: 26,,, | Performed by: OBSTETRICS & GYNECOLOGY

## 2024-01-30 PROCEDURE — U0002 COVID-19 LAB TEST NON-CDC: HCPCS

## 2024-01-30 PROCEDURE — 96361 HYDRATE IV INFUSION ADD-ON: CPT

## 2024-01-30 PROCEDURE — 25000003 PHARM REV CODE 250: Performed by: OBSTETRICS & GYNECOLOGY

## 2024-01-30 PROCEDURE — 63600175 PHARM REV CODE 636 W HCPCS

## 2024-01-30 RX ORDER — PROMETHAZINE HYDROCHLORIDE 12.5 MG/1
12.5 TABLET ORAL ONCE
Status: COMPLETED | OUTPATIENT
Start: 2024-01-31 | End: 2024-01-31

## 2024-01-30 RX ORDER — GUAIFENESIN 600 MG/1
600 TABLET, EXTENDED RELEASE ORAL ONCE
Status: COMPLETED | OUTPATIENT
Start: 2024-01-30 | End: 2024-01-30

## 2024-01-30 RX ORDER — ONDANSETRON HYDROCHLORIDE 2 MG/ML
4 INJECTION, SOLUTION INTRAVENOUS ONCE
Status: COMPLETED | OUTPATIENT
Start: 2024-01-30 | End: 2024-01-30

## 2024-01-30 RX ADMIN — ONDANSETRON 4 MG: 2 INJECTION INTRAMUSCULAR; INTRAVENOUS at 02:01

## 2024-01-30 RX ADMIN — SODIUM CHLORIDE, SODIUM LACTATE, POTASSIUM CHLORIDE, CALCIUM CHLORIDE AND DEXTROSE MONOHYDRATE 1000 ML: 5; 600; 310; 30; 20 INJECTION, SOLUTION INTRAVENOUS at 02:01

## 2024-01-30 RX ADMIN — GUAIFENESIN 600 MG: 600 TABLET, EXTENDED RELEASE ORAL at 03:01

## 2024-01-30 RX ADMIN — PROMETHAZINE HYDROCHLORIDE 12.5 MG: 25 INJECTION INTRAMUSCULAR; INTRAVENOUS at 04:01

## 2024-01-30 NOTE — ED PROVIDER NOTES
Encounter Date: 2024       History     Chief Complaint   Patient presents with    Cough    Vaginal Discharge    burning on urination     Nasal Congestion     Ms. Dietrich is a 22yo  at 29w2d with an IUP complicated by HG, obesity, hx PPD and hx migraines, here today with concern for N/V and general malaise for the past 2 weeks.  She reports persistent nasal discharge for the last couple days, accompanied by a single episode of chills w/o fever.  She is most bothered by continuous postnasal drip and has attempted using telson pearls w/o improvement in symptoms.  She reports multiple sick contacts at home over the last several weeks.  She last vomited approximately 1 hour PTA and has not attempted OTC analgesia.  She has been unable to tolerate PO intake and reports multiple bouts of retching with any attempt.  She endorses good fetal movement and denies PV bleeding/spotting, ctxs nor LoF.  She has no other concerns at this time.        Review of patient's allergies indicates:  No Known Allergies  Past Medical History:   Diagnosis Date    Anemia affecting pregnancy in first trimester 2023    Postpartum depression 2/10/2020     No past surgical history on file.  Family History   Problem Relation Age of Onset    Cancer Maternal Grandmother     Hypertension Maternal Grandmother     Hypertension Mother     Arrhythmia Neg Hx     Cardiomyopathy Neg Hx     Congenital heart disease Neg Hx     Heart attacks under age 50 Neg Hx     Pacemaker/defibrilator Neg Hx      Social History     Tobacco Use    Smoking status: Never    Smokeless tobacco: Never   Substance Use Topics    Alcohol use: No    Drug use: No     Review of Systems   Constitutional:  Positive for chills. Negative for fatigue and fever.   HENT:  Positive for postnasal drip. Negative for sneezing and sore throat.    Eyes:  Negative for visual disturbance.   Respiratory:  Negative for cough, choking and shortness of breath.    Cardiovascular:  Negative  for chest pain and palpitations.   Gastrointestinal:  Positive for nausea and vomiting.   Genitourinary:  Negative for dysuria and hematuria.   Neurological:  Negative for light-headedness and headaches.       Physical Exam     Initial Vitals [01/30/24 1350]   BP Pulse Resp Temp SpO2   137/73 (!) 121 18 97.9 °F (36.6 °C) 99 %      MAP       --         Physical Exam    Nursing note and vitals reviewed.  Constitutional: She appears well-developed and well-nourished. She is not diaphoretic. No distress.   HENT:   Head: Normocephalic and atraumatic.   Eyes: EOM are normal. Pupils are equal, round, and reactive to light.   Cardiovascular:  Normal rate.           Pulmonary/Chest: Breath sounds normal. No respiratory distress.   Musculoskeletal:         General: Normal range of motion.     Neurological: She is alert and oriented to person, place, and time. She has normal strength.   Skin: Skin is warm and dry.   Psychiatric: She has a normal mood and affect. Her behavior is normal. Judgment and thought content normal.         ED Course   Obtain Fetal nonstress test (NST)    Date/Time: 1/30/2024 8:00 PM    Performed by: Anatoly Foster MD  Authorized by: Anatoly Foster MD    Nonstress Test:     Variability:  6-25 BPM    Decelerations:  None    Accelerations:  15 bpm    Acoustic Stimulator: No      Baseline:  130    Uterine Irritability: No      Contractions:  Not present  Biophysical Profile:     Nonstress Test Interpretation: reactive      Overall Impression:  Reassuring    Labs Reviewed   CBC W/ AUTO DIFFERENTIAL - Abnormal; Notable for the following components:       Result Value    RBC 3.90 (*)     Hemoglobin 11.5 (*)     Hematocrit 34.3 (*)     Immature Granulocytes 0.8 (*)     Immature Grans (Abs) 0.06 (*)     All other components within normal limits   COMPREHENSIVE METABOLIC PANEL - Abnormal; Notable for the following components:    BUN 5 (*)     Albumin 2.8 (*)     All other components within normal limits    INFLUENZA A & B BY MOLECULAR   GROUP A STREP, MOLECULAR   SARS-COV-2 RNA AMPLIFICATION, QUAL   POCT URINALYSIS, DIPSTICK OR TABLET REAGENT, AUTOMATED, WITH MICROSCOP          Imaging Results    None          Medications   dextrose 5% lactated ringers bolus 1,000 mL (0 mLs Intravenous Stopped 1/30/24 1517)   ondansetron injection 4 mg (4 mg Intravenous Given 1/30/24 1431)   guaiFENesin 12 hr tablet 600 mg (600 mg Oral Given 1/30/24 1514)   promethazine (PHENERGAN) 12.5 mg in dextrose 5 % (D5W) 50 mL IVPB (0 mg Intravenous Stopped 1/30/24 1636)     Medical Decision Making  VSS on presentation, with later maternal tachycardia  Afebrile  NST RR    POCT urinalysis: 1+ ketones, 1+ urobilinogen, trace protein    1L D5LR IV given with improvement in maternal tachycardia  4 mg ondansetron given with little improvement    CBC/CMP wnl  Influenza/Flu/Strep neg    12.5 mg promethazine and 600 mg guaifenesin given with good effect    Pt tolerating PO liquids, but declines to attempt solids.  Offered pt GI cocktail, pt declines.  Desires to try soup or smoothie at home.     Pt stable and amenable to discharge.  Strict return precautions given, pt voiced understanding.    Amount and/or Complexity of Data Reviewed  Labs: ordered.    Risk  OTC drugs.  Prescription drug management.              Attending Attestation:   Physician Attestation Statement for Resident:  As the supervising MD   Physician Attestation Statement: I have personally seen and examined this patient.   I agree with the above history.  -:   As the supervising MD I agree with the above PE.     As the supervising MD I agree with the above treatment, course, plan, and disposition.   I was personally present during the critical portions of the procedure(s) performed by the resident and was immediately available in the ED to provide services and assistance as needed during the entire procedure.                                         Clinical Impression:  Final  diagnoses:  [R11.2] Nausea and vomiting, unspecified vomiting type (Primary)  [Z3A.29] 29 weeks gestation of pregnancy          ED Disposition Condition    Discharge Stable          ED Prescriptions    None       Follow-up Information    None          Cheryle Tello MD  01/31/24 0158

## 2024-01-31 VITALS
TEMPERATURE: 98 F | SYSTOLIC BLOOD PRESSURE: 106 MMHG | RESPIRATION RATE: 16 BRPM | DIASTOLIC BLOOD PRESSURE: 57 MMHG | HEART RATE: 85 BPM

## 2024-01-31 PROCEDURE — 59025 FETAL NON-STRESS TEST: CPT | Mod: 76

## 2024-01-31 PROCEDURE — 25000003 PHARM REV CODE 250

## 2024-01-31 RX ORDER — PROMETHAZINE HYDROCHLORIDE 12.5 MG/1
12.5 TABLET ORAL EVERY 4 HOURS PRN
Qty: 30 TABLET | Refills: 2 | Status: SHIPPED | OUTPATIENT
Start: 2024-01-31 | End: 2024-02-22 | Stop reason: SDUPTHER

## 2024-01-31 RX ADMIN — PROMETHAZINE HYDROCHLORIDE 12.5 MG: 12.5 TABLET ORAL at 12:01

## 2024-01-31 NOTE — DISCHARGE INSTRUCTIONS
Contact your primary OB or after hours at [phone] if you experience any of the following:    Contractions every 7-10 minutes for 1 or more hours.   A sudden gush or constant leaking of fluid.  Heavy vaginal bleeding.   If you experience a constant headache, blurry vision, pain underneath your right rib, or sudden swelling of your hands, feet, and face.   If you are 28 weeks pregnant or greater, you can measure kick counts with a goal of 10 or more movements within 2 hours.     Remember to stay hydrated; drink 8-10 bottles of water a day.     Maintain all follow-up appointments.

## 2024-01-31 NOTE — ED PROVIDER NOTES
Encounter Date: 2024       History     Chief Complaint   Patient presents with    Emesis     HPI  Annalisa Dietrich is a 23 y.o. [id number] at 29w3d presents complaining of inability to tolerate PO. The patient reports that she was evaluated earlier today for emesis and congestion. Covid/flu/Strep negative. She declined PO challenge, her symptoms improved and she was discharged. The patient states she went home and drank soup and a smoothie.     This IUP is complicated by anemia, HEG, hx postpartum depression, obesity, hx migraines.  Patient denies contractions, denies vaginal bleeding, denies LOF.   Fetal Movement: normal.      Review of patient's allergies indicates:  No Known Allergies  Past Medical History:   Diagnosis Date    Anemia affecting pregnancy in first trimester 2023    Postpartum depression 2/10/2020     No past surgical history on file.  Family History   Problem Relation Age of Onset    Cancer Maternal Grandmother     Hypertension Maternal Grandmother     Hypertension Mother     Arrhythmia Neg Hx     Cardiomyopathy Neg Hx     Congenital heart disease Neg Hx     Heart attacks under age 50 Neg Hx     Pacemaker/defibrilator Neg Hx      Social History     Tobacco Use    Smoking status: Never    Smokeless tobacco: Never   Substance Use Topics    Alcohol use: No    Drug use: No     Review of Systems   Constitutional:  Negative for chills, fatigue and fever.   HENT:  Negative for congestion.    Eyes:  Negative for photophobia and visual disturbance.   Respiratory:  Negative for chest tightness and shortness of breath.    Cardiovascular:  Negative for chest pain.   Gastrointestinal:  Positive for nausea and vomiting. Negative for abdominal pain, constipation and diarrhea.   Neurological:  Negative for weakness and headaches.   Psychiatric/Behavioral:  Negative for behavioral problems, confusion, decreased concentration, dysphoric mood and hallucinations.        Physical Exam     Initial Vitals [24  2329]   BP Pulse Resp Temp SpO2   (!) 106/54 85 16 98.1 °F (36.7 °C) --      MAP       --         Physical Exam    Vitals reviewed.  Constitutional: She appears well-developed and well-nourished.   HENT:   Head: Normocephalic and atraumatic.   Eyes: EOM are normal.   Neck:   Normal range of motion.  Cardiovascular:  Normal rate.           Pulmonary/Chest: No respiratory distress. She exhibits no tenderness.   Abdominal: Abdomen is soft. There is no abdominal tenderness. There is no rebound and no guarding.   Musculoskeletal:         General: Normal range of motion.      Cervical back: Normal range of motion.     Neurological: She is alert and oriented to person, place, and time.   Skin: Skin is warm and dry.   Psychiatric: She has a normal mood and affect. Thought content normal.         ED Course   Obtain Fetal nonstress test (NST)    Date/Time: 1/31/2024 6:01 AM    Performed by: Andres Stevens MD  Authorized by: Andres Stevens MD    Nonstress Test:     Variability:  6-25 BPM    Decelerations:  None    Accelerations:  10 bpm    Baseline:  145    Contractions:  Not present  Biophysical Profile:     Nonstress Test Interpretation: reactive      Overall Impression:  Reassuring    Labs Reviewed - No data to display       Imaging Results    None          Medications   promethazine tablet 12.5 mg (12.5 mg Oral Given 1/31/24 0000)     Medical Decision Making  Nausea and Vomiting  - NST reactive and reassuring  - PO phenergan administered, PO challenge passed   - Pt stable for discharge  - Will d/c with PO phenergan. Instructed patient on diet that are conducive to N/V  - RTC for prenatal appt as scheduled  - RT [facility] for ctx of increasing frequency/intensity, LOF, decreased fetal movement, vaginal bleeding         Risk  Prescription drug management.              Attending Attestation:   Physician Attestation Statement for Resident:  As the supervising MD   Physician Attestation Statement: I have personally seen  and examined this patient.   I agree with the above history.  -:   As the supervising MD I agree with the above PE.     As the supervising MD I agree with the above treatment, course, plan, and disposition.   I was personally present during the critical portions of the procedure(s) performed by the resident and was immediately available in the ED to provide services and assistance as needed during the entire procedure.                                         Clinical Impression:  Final diagnoses:  [R11.2] Nausea and vomiting, unspecified vomiting type (Primary)  [Z3A.29] 29 weeks gestation of pregnancy  [R68.89] Congestion of throat          ED Disposition Condition    Discharge Stable          ED Prescriptions       Medication Sig Dispense Start Date End Date Auth. Provider    promethazine (PHENERGAN) 12.5 MG Tab Take 1 tablet (12.5 mg total) by mouth every 4 (four) hours as needed (Nausea and vomiting). 30 tablet 1/31/2024 -- Andres Stevens MD          Follow-up Information    None          Andres Stevens MD  Resident  01/31/24 0605       Andres Stevens MD  Resident  01/31/24 0606       Cheryle Tello MD  01/31/24 0659

## 2024-01-31 NOTE — DISCHARGE INSTRUCTIONS
Stay hydrated by drinking 2-3 liters of water per day. Call or come to the OB Emergency Department for signs of labor such as painful contractions, leaking of fluid, or bleeding. You may take Tylenol (2 regular strength or 1 extra strength) for discomforts of pregnancy such as mild cramping, soreness, and back pain. If you have a history of elevated blood pressures, call us for increased blood pressure readings at home, a headache that persists after taking Tylenol, blurred vision, shortness of breath, or sharp upper abdominal pain. Monitor your baby's movements. If you are concerned that your baby's movements are decreased, call the [facility] for further evaluation.     OB Emergency Department: [phone]  ]

## 2024-02-03 ENCOUNTER — HOSPITAL ENCOUNTER (OUTPATIENT)
Facility: HOSPITAL | Age: 24
Discharge: HOME | End: 2024-02-04
Attending: STUDENT IN AN ORGANIZED HEALTH CARE EDUCATION/TRAINING PROGRAM | Admitting: STUDENT IN AN ORGANIZED HEALTH CARE EDUCATION/TRAINING PROGRAM
Payer: MEDICAID

## 2024-02-03 LAB
ALBUMIN SERPL BCP-MCNC: 2.5 G/DL (ref 3.5–5.2)
ALP SERPL-CCNC: 91 U/L (ref 55–135)
ALT SERPL W/O P-5'-P-CCNC: 37 U/L (ref 10–44)
ANION GAP SERPL CALC-SCNC: 9 MMOL/L (ref 8–16)
AST SERPL-CCNC: 43 U/L (ref 10–40)
BASOPHILS # BLD AUTO: 0.04 K/UL (ref 0–0.2)
BASOPHILS NFR BLD: 0.4 % (ref 0–1.9)
BILIRUB SERPL-MCNC: 0.3 MG/DL (ref 0.1–1)
BUN SERPL-MCNC: 4 MG/DL (ref 6–20)
CALCIUM SERPL-MCNC: 8.5 MG/DL (ref 8.7–10.5)
CHLORIDE SERPL-SCNC: 107 MMOL/L (ref 95–110)
CO2 SERPL-SCNC: 21 MMOL/L (ref 23–29)
CREAT SERPL-MCNC: 0.6 MG/DL (ref 0.5–1.4)
DIFFERENTIAL METHOD BLD: ABNORMAL
EOSINOPHIL # BLD AUTO: 0.1 K/UL (ref 0–0.5)
EOSINOPHIL NFR BLD: 1.4 % (ref 0–8)
ERYTHROCYTE [DISTWIDTH] IN BLOOD BY AUTOMATED COUNT: 12.3 % (ref 11.5–14.5)
EST. GFR  (NO RACE VARIABLE): >60 ML/MIN/1.73 M^2
GLUCOSE SERPL-MCNC: 73 MG/DL (ref 70–110)
HCT VFR BLD AUTO: 29.7 % (ref 37–48.5)
HGB BLD-MCNC: 10.5 G/DL (ref 12–16)
IMM GRANULOCYTES # BLD AUTO: 0.06 K/UL (ref 0–0.04)
IMM GRANULOCYTES NFR BLD AUTO: 0.6 % (ref 0–0.5)
LYMPHOCYTES # BLD AUTO: 2.2 K/UL (ref 1–4.8)
LYMPHOCYTES NFR BLD: 23.5 % (ref 18–48)
MCH RBC QN AUTO: 30.4 PG (ref 27–31)
MCHC RBC AUTO-ENTMCNC: 35.4 G/DL (ref 32–36)
MCV RBC AUTO: 86 FL (ref 82–98)
MONOCYTES # BLD AUTO: 0.6 K/UL (ref 0.3–1)
MONOCYTES NFR BLD: 5.9 % (ref 4–15)
NEUTROPHILS # BLD AUTO: 6.3 K/UL (ref 1.8–7.7)
NEUTROPHILS NFR BLD: 68.2 % (ref 38–73)
NRBC BLD-RTO: 0 /100 WBC
PLATELET # BLD AUTO: 274 K/UL (ref 150–450)
PMV BLD AUTO: 9.5 FL (ref 9.2–12.9)
POTASSIUM SERPL-SCNC: 3.5 MMOL/L (ref 3.5–5.1)
PROT SERPL-MCNC: 5.9 G/DL (ref 6–8.4)
RBC # BLD AUTO: 3.45 M/UL (ref 4–5.4)
SODIUM SERPL-SCNC: 137 MMOL/L (ref 136–145)
WBC # BLD AUTO: 9.26 K/UL (ref 3.9–12.7)

## 2024-02-03 PROCEDURE — 36415 COLL VENOUS BLD VENIPUNCTURE: CPT | Performed by: STUDENT IN AN ORGANIZED HEALTH CARE EDUCATION/TRAINING PROGRAM

## 2024-02-03 PROCEDURE — 80053 COMPREHEN METABOLIC PANEL: CPT | Performed by: STUDENT IN AN ORGANIZED HEALTH CARE EDUCATION/TRAINING PROGRAM

## 2024-02-03 PROCEDURE — G0378 HOSPITAL OBSERVATION PER HR: HCPCS

## 2024-02-03 PROCEDURE — 63600175 PHARM REV CODE 636 W HCPCS: Performed by: STUDENT IN AN ORGANIZED HEALTH CARE EDUCATION/TRAINING PROGRAM

## 2024-02-03 PROCEDURE — 85025 COMPLETE CBC W/AUTO DIFF WBC: CPT | Performed by: STUDENT IN AN ORGANIZED HEALTH CARE EDUCATION/TRAINING PROGRAM

## 2024-02-03 RX ORDER — ONDANSETRON HYDROCHLORIDE 2 MG/ML
8 INJECTION, SOLUTION INTRAVENOUS ONCE AS NEEDED
Status: COMPLETED | OUTPATIENT
Start: 2024-02-03 | End: 2024-02-03

## 2024-02-03 RX ADMIN — SODIUM CHLORIDE, SODIUM LACTATE, POTASSIUM CHLORIDE, AND CALCIUM CHLORIDE 1000 ML: .6; .31; .03; .02 INJECTION, SOLUTION INTRAVENOUS at 10:02

## 2024-02-03 RX ADMIN — ONDANSETRON 8 MG: 2 INJECTION INTRAMUSCULAR; INTRAVENOUS at 10:02

## 2024-02-04 ENCOUNTER — PATIENT MESSAGE (OUTPATIENT)
Dept: OTHER | Facility: OTHER | Age: 24
End: 2024-02-04
Payer: MEDICAID

## 2024-02-04 VITALS — HEART RATE: 83 BPM | DIASTOLIC BLOOD PRESSURE: 53 MMHG | SYSTOLIC BLOOD PRESSURE: 92 MMHG | OXYGEN SATURATION: 98 %

## 2024-02-04 PROCEDURE — G0378 HOSPITAL OBSERVATION PER HR: HCPCS

## 2024-02-04 PROCEDURE — 99211 OFF/OP EST MAY X REQ PHY/QHP: CPT

## 2024-02-04 PROCEDURE — 63600175 PHARM REV CODE 636 W HCPCS: Performed by: STUDENT IN AN ORGANIZED HEALTH CARE EDUCATION/TRAINING PROGRAM

## 2024-02-04 PROCEDURE — 25000003 PHARM REV CODE 250: Performed by: STUDENT IN AN ORGANIZED HEALTH CARE EDUCATION/TRAINING PROGRAM

## 2024-02-04 RX ADMIN — PROMETHAZINE HYDROCHLORIDE 25 MG: 25 INJECTION INTRAMUSCULAR; INTRAVENOUS at 12:02

## 2024-02-04 NOTE — NURSING
Pt awakened on rounds. States she feels better. Given po challenge with apple juice and hailey crackers.

## 2024-02-04 NOTE — NURSING
Pt sleeping on rounds. States nausea is better but still present. When asked if she wanted something else, did not answer.

## 2024-02-04 NOTE — NURSING
Phenergan 25mg ivpb for c/o nausea. Pt reports feeling better but still having some nausea. No vomiting noted. Assisted up to bathroom.

## 2024-02-04 NOTE — NURSING
Iv started, ivf bolus started. Zofran 8mg sivp given. Labs drawn. Poc explained. No active vomiting.

## 2024-02-04 NOTE — NURSING
Iv d/c'd. Verbal and printed d/c instructions given. F/u appt at [facility] on feb 12.

## 2024-02-04 NOTE — NURSING
Pt admitted from er for c/o hyperemesis. Unrefered  at29-6 weeks. States has been having n/v entire pregnancy. Takes zofran, phenergan, b6 with moderate success. +fm, denies ctxs, rom, vb, dysuria. Anterior placenta. Fht's 120's, reactive. Bp's wnl. Iv access started 22g left hand. Pt having some n/v in triage  but states it might have stopped, nothing left to vomit. Pt also staes she vomited up a small amount of blood, due to vomiting so much.

## 2024-02-06 ENCOUNTER — TELEPHONE (OUTPATIENT)
Dept: OBSTETRICS AND GYNECOLOGY | Facility: CLINIC | Age: 24
End: 2024-02-06
Payer: MEDICAID

## 2024-02-06 DIAGNOSIS — O21.9 NAUSEA AND VOMITING DURING PREGNANCY: Primary | ICD-10-CM

## 2024-02-06 NOTE — TELEPHONE ENCOUNTER
"Spoke to patient regarding n/v. Has had multiple ER visits, gets d/c'ed home after successful PO challenge. Taking zofran, phenergan, pepcid, b6/unisom. Able to tolerate liquids and crackers. Discussed drinking protein shakes. Discussed attempting to eat protein instead of crackers if tolerated. Of note, she reports 30lb weight lossl per prenatal notes, weight loss is -12lb. Growth scan ordered. Will return to clinic before 2/12. States, "I can deal with the neausea, but I am concerned with baby's growth."  "

## 2024-02-07 ENCOUNTER — PROCEDURE VISIT (OUTPATIENT)
Dept: MATERNAL FETAL MEDICINE | Facility: CLINIC | Age: 24
End: 2024-02-07
Payer: MEDICAID

## 2024-02-07 DIAGNOSIS — O21.9 NAUSEA AND VOMITING DURING PREGNANCY: ICD-10-CM

## 2024-02-07 PROCEDURE — 76816 OB US FOLLOW-UP PER FETUS: CPT | Mod: PBBFAC | Performed by: STUDENT IN AN ORGANIZED HEALTH CARE EDUCATION/TRAINING PROGRAM

## 2024-02-08 ENCOUNTER — HOSPITAL ENCOUNTER (EMERGENCY)
Facility: OTHER | Age: 24
Discharge: HOME | End: 2024-02-08
Attending: OBSTETRICS & GYNECOLOGY
Payer: MEDICAID

## 2024-02-08 ENCOUNTER — ROUTINE PRENATAL (OUTPATIENT)
Dept: OBSTETRICS AND GYNECOLOGY | Facility: CLINIC | Age: 24
End: 2024-02-08
Payer: MEDICAID

## 2024-02-08 VITALS
OXYGEN SATURATION: 99 % | DIASTOLIC BLOOD PRESSURE: 53 MMHG | SYSTOLIC BLOOD PRESSURE: 95 MMHG | TEMPERATURE: 98 F | RESPIRATION RATE: 20 BRPM | HEART RATE: 85 BPM

## 2024-02-08 VITALS
WEIGHT: 235.56 LBS | DIASTOLIC BLOOD PRESSURE: 68 MMHG | BODY MASS INDEX: 34.79 KG/M2 | SYSTOLIC BLOOD PRESSURE: 133 MMHG

## 2024-02-08 DIAGNOSIS — R63.4 WEIGHT LOSS, UNINTENTIONAL: ICD-10-CM

## 2024-02-08 DIAGNOSIS — O21.9 NAUSEA AND VOMITING DURING PREGNANCY: Primary | ICD-10-CM

## 2024-02-08 DIAGNOSIS — Z3A.30 30 WEEKS GESTATION OF PREGNANCY: ICD-10-CM

## 2024-02-08 DIAGNOSIS — R16.0 LIVER MASS: ICD-10-CM

## 2024-02-08 DIAGNOSIS — O21.9 VOMITING OR NAUSEA OF PREGNANCY: Primary | ICD-10-CM

## 2024-02-08 DIAGNOSIS — R74.01 TRANSAMINITIS: ICD-10-CM

## 2024-02-08 DIAGNOSIS — O99.013 ANEMIA AFFECTING PREGNANCY IN THIRD TRIMESTER: ICD-10-CM

## 2024-02-08 LAB
ALBUMIN SERPL BCP-MCNC: 3 G/DL (ref 3.5–5.2)
ALP SERPL-CCNC: 109 U/L (ref 55–135)
ALT SERPL W/O P-5'-P-CCNC: 66 U/L (ref 10–44)
ANION GAP SERPL CALC-SCNC: 11 MMOL/L (ref 8–16)
AST SERPL-CCNC: 57 U/L (ref 10–40)
BILIRUB SERPL-MCNC: 0.4 MG/DL (ref 0.1–1)
BILIRUB SERPL-MCNC: NEGATIVE MG/DL
BLOOD URINE, POC: NEGATIVE
BUN SERPL-MCNC: 5 MG/DL (ref 6–20)
CALCIUM SERPL-MCNC: 8.8 MG/DL (ref 8.7–10.5)
CHLORIDE SERPL-SCNC: 105 MMOL/L (ref 95–110)
CO2 SERPL-SCNC: 21 MMOL/L (ref 23–29)
COLOR, POC UA: NORMAL
CREAT SERPL-MCNC: 0.7 MG/DL (ref 0.5–1.4)
EST. GFR  (NO RACE VARIABLE): >60 ML/MIN/1.73 M^2
GLUCOSE SERPL-MCNC: 93 MG/DL (ref 70–110)
GLUCOSE UR QL STRIP: NORMAL
KETONES UR QL STRIP: NORMAL
LEUKOCYTE ESTERASE URINE, POC: NEGATIVE
MAGNESIUM SERPL-MCNC: 1.7 MG/DL (ref 1.6–2.6)
NITRITE, POC UA: NEGATIVE
PH, POC UA: 7
PHOSPHATE SERPL-MCNC: 2.5 MG/DL (ref 2.7–4.5)
POTASSIUM SERPL-SCNC: 3.5 MMOL/L (ref 3.5–5.1)
PROT SERPL-MCNC: 6.9 G/DL (ref 6–8.4)
PROTEIN, POC: NORMAL
SODIUM SERPL-SCNC: 137 MMOL/L (ref 136–145)
SPECIFIC GRAVITY, POC UA: 1.01
UROBILINOGEN, POC UA: 1

## 2024-02-08 PROCEDURE — 59025 FETAL NON-STRESS TEST: CPT | Mod: 26,,, | Performed by: OBSTETRICS & GYNECOLOGY

## 2024-02-08 PROCEDURE — 99213 OFFICE O/P EST LOW 20 MIN: CPT | Mod: PBBFAC,TH,25

## 2024-02-08 PROCEDURE — 83735 ASSAY OF MAGNESIUM: CPT

## 2024-02-08 PROCEDURE — 99214 OFFICE O/P EST MOD 30 MIN: CPT | Mod: TH,S$PBB,,

## 2024-02-08 PROCEDURE — 80053 COMPREHEN METABOLIC PANEL: CPT

## 2024-02-08 PROCEDURE — 59025 FETAL NON-STRESS TEST: CPT

## 2024-02-08 PROCEDURE — 99285 EMERGENCY DEPT VISIT HI MDM: CPT | Mod: 25,27

## 2024-02-08 PROCEDURE — 81002 URINALYSIS NONAUTO W/O SCOPE: CPT

## 2024-02-08 PROCEDURE — 96361 HYDRATE IV INFUSION ADD-ON: CPT | Mod: 59

## 2024-02-08 PROCEDURE — 99999 PR PBB SHADOW E&M-EST. PATIENT-LVL III: CPT | Mod: PBBFAC,,,

## 2024-02-08 PROCEDURE — 99284 EMERGENCY DEPT VISIT MOD MDM: CPT | Mod: 25,,, | Performed by: OBSTETRICS & GYNECOLOGY

## 2024-02-08 PROCEDURE — 63600175 PHARM REV CODE 636 W HCPCS

## 2024-02-08 PROCEDURE — 84100 ASSAY OF PHOSPHORUS: CPT

## 2024-02-08 PROCEDURE — 96374 THER/PROPH/DIAG INJ IV PUSH: CPT

## 2024-02-08 RX ORDER — DIPHENHYDRAMINE HYDROCHLORIDE 50 MG/ML
25 INJECTION INTRAMUSCULAR; INTRAVENOUS ONCE
Status: DISCONTINUED | OUTPATIENT
Start: 2024-02-08 | End: 2024-02-08

## 2024-02-08 RX ORDER — METOCLOPRAMIDE HYDROCHLORIDE 5 MG/ML
10 INJECTION INTRAMUSCULAR; INTRAVENOUS ONCE
Status: DISCONTINUED | OUTPATIENT
Start: 2024-02-08 | End: 2024-02-08

## 2024-02-08 RX ORDER — METOCLOPRAMIDE HYDROCHLORIDE 5 MG/ML
10 INJECTION INTRAMUSCULAR; INTRAVENOUS ONCE
Status: COMPLETED | OUTPATIENT
Start: 2024-02-08 | End: 2024-02-08

## 2024-02-08 RX ADMIN — SODIUM CHLORIDE, POTASSIUM CHLORIDE, SODIUM LACTATE AND CALCIUM CHLORIDE 1000 ML: 600; 310; 30; 20 INJECTION, SOLUTION INTRAVENOUS at 11:02

## 2024-02-08 RX ADMIN — METOCLOPRAMIDE 10 MG: 5 INJECTION, SOLUTION INTRAMUSCULAR; INTRAVENOUS at 11:02

## 2024-02-08 NOTE — DISCHARGE INSTRUCTIONS
Please read through your discharge information thoroughly. Note pregnancy warning signs listed i.e. headache, lower abdominal and/or back pain unresolved by Tylenol, or is persistent; sudden and/or increased swelling; visual disturbances; vaginal bleeding and or leakage of water and decreased or absent fetal movement. Ensure to stay hydrated and eat small, frequent, balanced meals. Keep all follow-up appointments. Take prescribed medications as ordered at the same time daily for the specified length of time. Tylenol tablets 1 gram can be taken every 6-hrs for pain.   If you have any questions or concerns, please call [facility] at # [phone] Option 2. For all emergencies please call 911!

## 2024-02-08 NOTE — ED PROVIDER NOTES
Encounter Date: 2024       History     Chief Complaint   Patient presents with    Headache    Dehydration     Referred by MW for IV fluids due to h/o hyperemesis    Dark Urine     HPI  Annalisa Dietrich is a 23 y.o. [id number] at 30w4d presents complaining of dehydration and headache. Patient has a history of hyperemesis gravidum. Patient reports multiple episodes of emesis in the last 2-3 days. Reports some improvement in nausea with rectal phenergan suppositories. Was able to tolerate crackers and smoothie this monring.   This IUP is complicated by HEG, unwanted fertility, obesity, and migraines.  Patient denies contractions, denies vaginal bleeding, denies LOF.   Fetal Movement: normal.     Review of patient's allergies indicates:  No Known Allergies  Past Medical History:   Diagnosis Date    Anemia affecting pregnancy in first trimester 2023    Migraine     Postpartum depression 02/10/2020     No past surgical history on file.  Family History   Problem Relation Age of Onset    Cancer Maternal Grandmother     Hypertension Maternal Grandmother     Hypertension Mother     Arrhythmia Neg Hx     Cardiomyopathy Neg Hx     Congenital heart disease Neg Hx     Heart attacks under age 50 Neg Hx     Pacemaker/defibrilator Neg Hx      Social History     Tobacco Use    Smoking status: Never    Smokeless tobacco: Never   Substance Use Topics    Alcohol use: No    Drug use: No     Review of Systems   Constitutional:  Negative for fever.   HENT:  Negative for congestion and sore throat.    Eyes:  Negative for photophobia.   Respiratory:  Negative for shortness of breath.    Cardiovascular:  Negative for chest pain.   Gastrointestinal:  Positive for nausea and vomiting. Negative for abdominal distention, abdominal pain and diarrhea.   Endocrine: Negative for polyphagia.   Genitourinary:  Negative for difficulty urinating, dysuria, pelvic pain and vaginal bleeding.   Skin:  Negative for color change.   Neurological:  Negative  for light-headedness and headaches.   Psychiatric/Behavioral:  Negative for confusion.    All other systems reviewed and are negative.      Physical Exam     Initial Vitals [02/08/24 1050]   BP Pulse Resp Temp SpO2   114/70 109 20 98 °F (36.7 °C) 99 %      MAP       --         Physical Exam    Constitutional: She appears well-developed and well-nourished. No distress.   HENT:   Head: Normocephalic and atraumatic.   Eyes: EOM are normal.   Cardiovascular:  Normal rate.           Pulmonary/Chest: She has no wheezes.   Abdominal: Abdomen is soft. There is no abdominal tenderness.   Genitourinary:    Genitourinary Comments: Pelvic exam deferred     Musculoskeletal:         General: Normal range of motion.     Neurological: She is alert and oriented to person, place, and time.   Skin: Skin is warm, dry and intact. No rash noted.   Psychiatric: She has a normal mood and affect. Her speech is normal and behavior is normal.       ED Course   Fetal non-stress test    Date/Time: 2/9/2024 10:53 AM    Performed by: Racheal Washington MD  Authorized by: Anita Powell MD    Nonstress Test:     Variability:  6-25 BPM    Decelerations:  None    Accelerations:  15 bpm    Acoustic Stimulator: No      Baseline:  135    Uterine Irritability: No      Contractions:  Not present  Biophysical Profile:     Nonstress Test Interpretation: reactive      Overall Impression:  Reassuring    Labs Reviewed   COMPREHENSIVE METABOLIC PANEL - Abnormal; Notable for the following components:       Result Value    CO2 21 (*)     BUN 5 (*)     Albumin 3.0 (*)     AST 57 (*)     ALT 66 (*)     All other components within normal limits   PHOSPHORUS - Abnormal; Notable for the following components:    Phosphorus 2.5 (*)     All other components within normal limits   MAGNESIUM   POCT URINALYSIS, DIPSTICK OR TABLET REAGENT, AUTOMATED, WITH MICROSCOP          Imaging Results              US Abdomen Limited (Final result)  Result time 02/08/24  13:48:55      Final result by Seymour Martin III, MD (24 13:48:55)                   Impression:      Solid liver lesion.  MRI without and with contrast is recommended to further characterize this.  Malignancy is not excluded.  This is hypoechoic.    Sludge within the liver.    Hepatomegaly.      Electronically signed by: Seymour Martin MD  Date:    2024  Time:    13:48               Narrative:    EXAMINATION:  US ABDOMEN LIMITED    CLINICAL HISTORY:  Elevating liver enzymes;    FINDINGS:  The pancreas demonstrates no abnormality.  The gallbladder demonstrates sludge but no wall thickening, or Coley sign.  The bile duct measures 2 mm.  The liver measures 17.4 cm.  There is a solid liver lesion with vascularity measuring 1.9 cm.  There is fatty change of liver.  Spleen measures 11 cm.                                       Medications   lactated ringers bolus 1,000 mL (0 mLs Intravenous Stopped 24 1345)   metoclopramide injection 10 mg (10 mg Intravenous Given 24 1136)     Medical Decision Making  23 y.o.  at 30w5d   - VSS, afebrile   - On chart review, patient has had 20 lbs weight loss this pregnancy   - VSS, afebrile   - 1LR d5LR given    - IV reglan given with improvement in acute nausea.   - PO challenge passed.   - Liver enzymes: ALT/AST 57/66  - US abdomen limited notable for there is a solid liver lesion with vascularity measuring 1.9 cm.    - Discussed with patient, ultrasound findings. Discussed need for further imaging with MRI w/ IV contrast after delivery.   - CNM primary team messaged.   - Stable for discharge.     Amount and/or Complexity of Data Reviewed  Labs: ordered. Decision-making details documented in ED Course.  Radiology: ordered. Decision-making details documented in ED Course.    Risk  Prescription drug management.              Attending Attestation:   Physician Attestation Statement for Resident:  As the supervising MD   Physician Attestation Statement: I have  personally seen and examined this patient.   I agree with the above history.  -:   As the supervising MD I agree with the above PE.     As the supervising MD I agree with the above treatment, course, plan, and disposition.   I was personally present during the critical portions of the procedure(s) performed by the resident and was immediately available in the ED to provide services and assistance as needed during the entire procedure.              Attending ED Notes:   I have personally seen and examined the patient. I agree with the resident's note . Questions welcomed and answered to patient satisfaction.      @ 30w5d presenting for N/V in pregnancy complicated by hyperemesis.   NST: initial fetal tachycardia, improved with IV hydration. Reassuring and reactive prior to discharge  N/V: tolerated PO challenge, will send home with reglan and zofran.   RUQ scan for transaminitis with new solid,hypervascular lesion. D/w MFM- followup outpatient, awaiting radiology response regarding absolute need for contrast.   Plan for repeat liver enzymes in clinic this week. FU with midwives and MFM.     Agree with discharge to home, and given the following instructions: Resume normal activities, encouraged to hydrate well (3L or 100oz of fluids daily). Return to the OB ED for acute concerns such as vaginal bleeding, frequent or painful contractions, loss of fluid or decreased fetal movement.     Anita Marley MD  2024 12:16 PM          ED Course as of 24 1056   Thu 2024   1520 Annalisa Dietrich is a 23 y.o. female  @ 30w4d presenting for N/V in the setting of hyperemesis with 20 lbs weight loss  VSS   [RF]   1520 AST(!): 57 [RF]   1520 ALT(!): 66 [RF]   1520 Glucose: 93 [RF]   1520 US Abdomen Limited [RF]      ED Course User Index  [RF] Anita Powell MD                           Clinical Impression:  Final diagnoses:  [O21.9] Vomiting or nausea of pregnancy (Primary)  [Z3A.30]  30 weeks gestation of pregnancy          ED Disposition Condition    Discharge Stable          ED Prescriptions    None       Follow-up Information       Follow up With Specialties Details Why Contact Info    Karen Berkowitz CNM Obstetrics and Gynecology   [address] [facility]  4th [address]  [facility] [address]  [phone]               Racheal Washington MD  Resident  02/14/24 113       Anita Powell MD  02/14/24 1226

## 2024-02-08 NOTE — PROGRESS NOTES
"23 y.o.,  at 30w4d    Presents today for routine OB appt. Complaints today: "Feeling dehydrated, some dizziness, headache".  Doing okay Very dark urine. Constant nausea - Very little relief with phenergan, zofran, pepcid, unisom/b6  States only receiving fluids makes her feel better. States will likely go to [facility] after visit.     TWG: -21 lbs   Denies UCs, LOF, VB. Denies HA, visual disturbances, or upper abdominal pain; no nausea/vomiting. Reports active fetal movement.    PHYSICAL EXAM  GENERAL: No acute distress  HEENT: Normocephalic, atraumatic  NEURO: Alert and oriented x3  PULMONARY: Non-labored respiration; no tachypnea  ABD: Soft, gravid, nontender.      ASSESSMENT AND PLAN  Nausea and vomiting during pregnancy    Weight loss, unintentional    30 weeks gestation of pregnancy    History of postpartum depression    Anemia affecting pregnancy in third trimester      Reviewed 28wk labs  Received TDAP last visit   Is taking Childbirth Education classes - took online class  Education regarding options for postpartum contraception, patient desires BTL/will consider.  Reviewed warning s/s for PreE, PTL, FMC, and discussed how/when to call.  Desires sterilization, discussed BTL after delivery or salpingectomy at a later date. Does not desire surgery during hospital stay. Medicaid consents signed today - GYN consult to be done before discharge for scheduling procedure   Has thrown up once this morning. Able to tolerate a smoothie   Discussed adding protein powder or greek yogurt to smoothies, discussed adding greens for iron; or drinking things like boost or other nutrient dense drinks  Recent growth US showed AGA fetus  Drinking gatorade, water, liquid IV. Discussed importance of plain water intake, salt content of electrolyte drinks discussed  No ketones on urine dip today    Birth Center Risk Assessment: 0- Meets birth center guidelines    CNM management in ABC  CNM management on L&D  Consultation with OB to " develop  plan of care  Collaborative CNM/OB management with delivery on L&D  Permanent referral of care to MD      Follow-up: 2 weeks, call or present sooner PRN      Kera Seals CNM, APRN

## 2024-02-08 NOTE — ASSESSMENT & PLAN NOTE
As of 30weeks, 21lb weight loss documented. Growth scan 2/7 - Interval fetal growth has been normal (EFW 34%, AC 20%).     Measurements:  Wt Readings from Last 1 Encounters:   02/08/24 106.8 kg (235 lb 9 oz)   Body mass index is 34.79 kg/m².

## 2024-02-09 ENCOUNTER — PATIENT MESSAGE (OUTPATIENT)
Dept: OBSTETRICS AND GYNECOLOGY | Facility: CLINIC | Age: 24
End: 2024-02-09
Payer: MEDICAID

## 2024-02-09 DIAGNOSIS — K76.9 LESION OF LIVER GREATER THAN 1 CM IN DIAMETER: Primary | ICD-10-CM

## 2024-02-09 DIAGNOSIS — K76.9 LIVER LESION: ICD-10-CM

## 2024-02-09 DIAGNOSIS — K76.9 LIVER DISEASE, UNSPECIFIED: ICD-10-CM

## 2024-02-09 PROBLEM — R74.01 TRANSAMINITIS: Status: ACTIVE | Noted: 2024-02-09

## 2024-02-12 ENCOUNTER — TELEPHONE (OUTPATIENT)
Dept: MATERNAL FETAL MEDICINE | Facility: CLINIC | Age: 24
End: 2024-02-12
Payer: MEDICAID

## 2024-02-12 NOTE — TELEPHONE ENCOUNTER
Called patient to touch base with her regarding setting up a Maternal Fetal Medicine consult for a liver lesion.  Patient was given number to set up her MRI first and then I would schedule her with us for a few days after so we can have results.  Patient verbalized her understanding.

## 2024-02-18 ENCOUNTER — PATIENT MESSAGE (OUTPATIENT)
Dept: OTHER | Facility: OTHER | Age: 24
End: 2024-02-18
Payer: MEDICAID

## 2024-02-20 ENCOUNTER — HOSPITAL ENCOUNTER (OUTPATIENT)
Dept: RADIOLOGY | Facility: OTHER | Age: 24
Discharge: HOME | End: 2024-02-20
Payer: MEDICAID

## 2024-02-20 DIAGNOSIS — K76.9 LESION OF LIVER GREATER THAN 1 CM IN DIAMETER: ICD-10-CM

## 2024-02-20 PROCEDURE — 74181 MRI ABDOMEN W/O CONTRAST: CPT | Mod: TC

## 2024-02-20 PROCEDURE — 74181 MRI ABDOMEN W/O CONTRAST: CPT | Mod: 26,,, | Performed by: RADIOLOGY

## 2024-02-22 ENCOUNTER — DOCUMENTATION ONLY (OUTPATIENT)
Dept: OBSTETRICS AND GYNECOLOGY | Facility: CLINIC | Age: 24
End: 2024-02-22

## 2024-02-22 ENCOUNTER — LAB VISIT (OUTPATIENT)
Dept: LAB | Facility: OTHER | Age: 24
End: 2024-02-22
Attending: NURSE PRACTITIONER
Payer: MEDICAID

## 2024-02-22 ENCOUNTER — ROUTINE PRENATAL (OUTPATIENT)
Dept: OBSTETRICS AND GYNECOLOGY | Facility: CLINIC | Age: 24
End: 2024-02-22
Payer: MEDICAID

## 2024-02-22 VITALS
DIASTOLIC BLOOD PRESSURE: 54 MMHG | HEART RATE: 107 BPM | WEIGHT: 233.56 LBS | SYSTOLIC BLOOD PRESSURE: 116 MMHG | BODY MASS INDEX: 34.49 KG/M2

## 2024-02-22 DIAGNOSIS — O99.013 ANEMIA AFFECTING PREGNANCY IN THIRD TRIMESTER: Primary | ICD-10-CM

## 2024-02-22 DIAGNOSIS — R63.4 WEIGHT LOSS, UNINTENTIONAL: ICD-10-CM

## 2024-02-22 DIAGNOSIS — Z34.83 ENCOUNTER FOR SUPERVISION OF OTHER NORMAL PREGNANCY IN THIRD TRIMESTER: ICD-10-CM

## 2024-02-22 DIAGNOSIS — R16.0 LIVER MASS: Primary | ICD-10-CM

## 2024-02-22 DIAGNOSIS — O21.9 NAUSEA AND VOMITING DURING PREGNANCY: ICD-10-CM

## 2024-02-22 DIAGNOSIS — K59.04 CHRONIC IDIOPATHIC CONSTIPATION: ICD-10-CM

## 2024-02-22 DIAGNOSIS — R74.01 TRANSAMINITIS: ICD-10-CM

## 2024-02-22 DIAGNOSIS — K76.9 LESION OF LIVER GREATER THAN 1 CM IN DIAMETER: ICD-10-CM

## 2024-02-22 DIAGNOSIS — Z3A.32 32 WEEKS GESTATION OF PREGNANCY: ICD-10-CM

## 2024-02-22 DIAGNOSIS — O21.0 HYPEREMESIS AFFECTING PREGNANCY, ANTEPARTUM: ICD-10-CM

## 2024-02-22 PROBLEM — O99.213 OBESITY AFFECTING PREGNANCY IN THIRD TRIMESTER: Status: ACTIVE | Noted: 2023-08-29

## 2024-02-22 LAB
ALBUMIN SERPL BCP-MCNC: 2.8 G/DL (ref 3.5–5.2)
ALP SERPL-CCNC: 110 U/L (ref 55–135)
ALT SERPL W/O P-5'-P-CCNC: 35 U/L (ref 10–44)
ANION GAP SERPL CALC-SCNC: 7 MMOL/L (ref 8–16)
AST SERPL-CCNC: 37 U/L (ref 10–40)
BILIRUB SERPL-MCNC: 0.4 MG/DL (ref 0.1–1)
BUN SERPL-MCNC: 5 MG/DL (ref 6–20)
CALCIUM SERPL-MCNC: 9.1 MG/DL (ref 8.7–10.5)
CHLORIDE SERPL-SCNC: 104 MMOL/L (ref 95–110)
CO2 SERPL-SCNC: 23 MMOL/L (ref 23–29)
CREAT SERPL-MCNC: 0.7 MG/DL (ref 0.5–1.4)
EST. GFR  (NO RACE VARIABLE): >60 ML/MIN/1.73 M^2
GLUCOSE SERPL-MCNC: 79 MG/DL (ref 70–110)
POTASSIUM SERPL-SCNC: 3.5 MMOL/L (ref 3.5–5.1)
PROT SERPL-MCNC: 6.6 G/DL (ref 6–8.4)
SODIUM SERPL-SCNC: 134 MMOL/L (ref 136–145)

## 2024-02-22 PROCEDURE — 99214 OFFICE O/P EST MOD 30 MIN: CPT | Mod: TH,S$PBB,,

## 2024-02-22 PROCEDURE — 80053 COMPREHEN METABOLIC PANEL: CPT

## 2024-02-22 PROCEDURE — 99999 PR PBB SHADOW E&M-EST. PATIENT-LVL III: CPT | Mod: PBBFAC,,,

## 2024-02-22 PROCEDURE — 36415 COLL VENOUS BLD VENIPUNCTURE: CPT

## 2024-02-22 PROCEDURE — 99213 OFFICE O/P EST LOW 20 MIN: CPT | Mod: PBBFAC,TH

## 2024-02-22 RX ORDER — SODIUM CHLORIDE 0.9 % (FLUSH) 0.9 %
10 SYRINGE (ML) INJECTION
Status: CANCELLED | OUTPATIENT
Start: 2024-02-22

## 2024-02-22 RX ORDER — HEPARIN 100 UNIT/ML
500 SYRINGE INTRAVENOUS
Status: CANCELLED | OUTPATIENT
Start: 2024-02-22

## 2024-02-22 RX ORDER — PROMETHAZINE HYDROCHLORIDE 12.5 MG/1
12.5 TABLET ORAL EVERY 4 HOURS PRN
Qty: 30 TABLET | Refills: 2 | Status: ON HOLD | OUTPATIENT
Start: 2024-02-22 | End: 2024-06-18

## 2024-02-22 RX ORDER — ONDANSETRON 4 MG/1
4 TABLET, FILM COATED ORAL EVERY 6 HOURS PRN
Qty: 30 TABLET | Refills: 1 | Status: ON HOLD | OUTPATIENT
Start: 2024-02-22 | End: 2024-06-18

## 2024-02-22 RX ORDER — METOCLOPRAMIDE 10 MG/1
10 TABLET ORAL EVERY 6 HOURS
Qty: 30 TABLET | Refills: 0 | Status: ON HOLD | OUTPATIENT
Start: 2024-02-22 | End: 2024-06-18

## 2024-02-22 RX ORDER — DOCUSATE SODIUM 100 MG/1
100 CAPSULE, LIQUID FILLED ORAL 2 TIMES DAILY PRN
Qty: 30 CAPSULE | Refills: 1 | Status: ON HOLD | OUTPATIENT
Start: 2024-02-22 | End: 2024-06-18

## 2024-02-22 NOTE — PROGRESS NOTES
CNMs reviewed patient medical and obstetrical history with Dr. Carlton.     Patient's history is significant for liver mass, hyperemesis.       Dr. Carlton recommendations for patient: Patient can receive IV fluids weekly from infusion clinic. Order placed. Re-visit risk status after M visit regarding liver mass.

## 2024-02-22 NOTE — PROGRESS NOTES
23 y.o. female  at 32w4d, by Estimated Date of Delivery: 24    Reports + FM, denies VB, LOF or regular CTX  Doing well with concerns. Persistent N/V. No diarrhea. Constipation x5 days. No RUQ pain. Taking iron. Appetite low yesterday, today is better  TW lbs     PHYSICAL EXAM  BP (!) 116/54   Pulse 107   Wt 106 kg (233 lb 9.2 oz)   BMI 34.49 kg/m²   GENERAL: No acute distress  HEENT: Normocephalic, atraumatic  NEURO: Alert and oriented x3  PULMONARY: Non-labored respiration; no tachypnea  ABD: Soft, gravid, nontender.      ASSESSMENT AND PLAN  Anemia affecting pregnancy in third trimester    Weight loss, unintentional    Lesion of liver greater than 1 cm in diameter    Transaminitis    Nausea and vomiting during pregnancy  -     ondansetron (ZOFRAN) 4 MG tablet; Take 1 tablet (4 mg total) by mouth every 6 (six) hours as needed for Nausea.  Dispense: 30 tablet; Refill: 1  -     promethazine (PHENERGAN) 12.5 MG Tab; Take 1 tablet (12.5 mg total) by mouth every 4 (four) hours as needed (Nausea and vomiting).  Dispense: 30 tablet; Refill: 2  -     metoclopramide HCl (REGLAN) 10 MG tablet; Take 1 tablet (10 mg total) by mouth every 6 (six) hours.  Dispense: 30 tablet; Refill: 0    32 weeks gestation of pregnancy    Encounter for supervision of other normal pregnancy in third trimester  -     docusate sodium (COLACE) 100 MG capsule; Take 1 capsule (100 mg total) by mouth 2 (two) times daily as needed for Constipation.  Dispense: 30 capsule; Refill: 1  -     ondansetron (ZOFRAN) 4 MG tablet; Take 1 tablet (4 mg total) by mouth every 6 (six) hours as needed for Nausea.  Dispense: 30 tablet; Refill: 1    Chronic idiopathic constipation  -     docusate sodium (COLACE) 100 MG capsule; Take 1 capsule (100 mg total) by mouth 2 (two) times daily as needed for Constipation.  Dispense: 30 capsule; Refill: 1      2.2cm liver lesion on MRI without contrast; indeterminate result given no contrast in pregnancy.  Appreciate recommendations from MFM   Repeat CMP today  Patient does plan to breast feed   Pediatrician: [facility] in [place]    Reviewed warning signs, normal FM,  labor precautions, and how/when to call.  Confirmed pt has after-hours number.  Follow-up: 2 weeks, call or present sooner PRN    Graciela Duque CNM/[facility]

## 2024-02-26 ENCOUNTER — INFUSION (OUTPATIENT)
Dept: INFUSION THERAPY | Facility: OTHER | Age: 24
End: 2024-02-26
Attending: FAMILY MEDICINE
Payer: MEDICAID

## 2024-02-26 ENCOUNTER — OFFICE VISIT (OUTPATIENT)
Dept: MATERNAL FETAL MEDICINE | Facility: CLINIC | Age: 24
End: 2024-02-26
Payer: MEDICAID

## 2024-02-26 VITALS — HEART RATE: 88 BPM | OXYGEN SATURATION: 100 % | TEMPERATURE: 98 F

## 2024-02-26 VITALS
HEIGHT: 69 IN | BODY MASS INDEX: 34.94 KG/M2 | DIASTOLIC BLOOD PRESSURE: 77 MMHG | WEIGHT: 235.88 LBS | SYSTOLIC BLOOD PRESSURE: 123 MMHG

## 2024-02-26 DIAGNOSIS — O21.0 HYPEREMESIS AFFECTING PREGNANCY, ANTEPARTUM: Primary | ICD-10-CM

## 2024-02-26 DIAGNOSIS — K76.9 LESION OF LIVER GREATER THAN 1 CM IN DIAMETER: ICD-10-CM

## 2024-02-26 PROCEDURE — 99214 OFFICE O/P EST MOD 30 MIN: CPT | Mod: S$PBB,,, | Performed by: OBSTETRICS & GYNECOLOGY

## 2024-02-26 PROCEDURE — 99999 PR PBB SHADOW E&M-EST. PATIENT-LVL III: CPT | Mod: PBBFAC,,, | Performed by: OBSTETRICS & GYNECOLOGY

## 2024-02-26 PROCEDURE — 96361 HYDRATE IV INFUSION ADD-ON: CPT

## 2024-02-26 PROCEDURE — 25000003 PHARM REV CODE 250

## 2024-02-26 PROCEDURE — 3074F SYST BP LT 130 MM HG: CPT | Mod: CPTII,,, | Performed by: OBSTETRICS & GYNECOLOGY

## 2024-02-26 PROCEDURE — 1159F MED LIST DOCD IN RCRD: CPT | Mod: CPTII,,, | Performed by: OBSTETRICS & GYNECOLOGY

## 2024-02-26 PROCEDURE — 96360 HYDRATION IV INFUSION INIT: CPT

## 2024-02-26 PROCEDURE — 99213 OFFICE O/P EST LOW 20 MIN: CPT | Mod: PBBFAC,25 | Performed by: OBSTETRICS & GYNECOLOGY

## 2024-02-26 PROCEDURE — 3008F BODY MASS INDEX DOCD: CPT | Mod: CPTII,,, | Performed by: OBSTETRICS & GYNECOLOGY

## 2024-02-26 PROCEDURE — 3078F DIAST BP <80 MM HG: CPT | Mod: CPTII,,, | Performed by: OBSTETRICS & GYNECOLOGY

## 2024-02-26 RX ORDER — HEPARIN 100 UNIT/ML
500 SYRINGE INTRAVENOUS
Status: CANCELLED | OUTPATIENT
Start: 2024-02-26

## 2024-02-26 RX ORDER — SODIUM CHLORIDE 0.9 % (FLUSH) 0.9 %
10 SYRINGE (ML) INJECTION
Status: CANCELLED | OUTPATIENT
Start: 2024-02-26

## 2024-02-26 RX ORDER — SODIUM CHLORIDE 0.9 % (FLUSH) 0.9 %
10 SYRINGE (ML) INJECTION
Status: DISCONTINUED | OUTPATIENT
Start: 2024-02-26 | End: 2024-02-26 | Stop reason: HOSPADM

## 2024-02-26 RX ORDER — HEPARIN 100 UNIT/ML
500 SYRINGE INTRAVENOUS
Status: DISCONTINUED | OUTPATIENT
Start: 2024-02-26 | End: 2024-02-26 | Stop reason: HOSPADM

## 2024-02-26 RX ADMIN — SODIUM CHLORIDE 1000 ML: 9 INJECTION, SOLUTION INTRAVENOUS at 09:02

## 2024-02-26 NOTE — PLAN OF CARE
Problem: Hyperemesis Gravidarum  Goal: Nausea and Vomiting Relief  Outcome: Ongoing, Progressing     Problem: Adult Inpatient Plan of Care  Goal: Plan of Care Review  Outcome: Ongoing, Progressing  Goal: Optimal Comfort and Wellbeing  Outcome: Ongoing, Progressing       Patient presented today for 1L normal saline infusion. PIV started to L hand and infusion given with no issues. VS remained stable throughout visit and assessment provided no issues. PIV removed w/o complications and all questions answered. Patient scheduled for next appt on 3-4-24 and left clinic ambulatory in no acute distress.

## 2024-02-26 NOTE — ASSESSMENT & PLAN NOTE
Abdominal US: 2/8/24  Impression:     Solid liver lesion.  MRI without and with contrast is recommended to further characterize this.  Malignancy is not excluded.  This is hypoechoic.     Sludge within the liver.     Hepatomegaly.    Abdominal MRI (2/20/24)  FINDINGS:  Liver: Hepatic steatosis.  2.2 cm lesion in segment 6 of the liver corresponding to the sonographic abnormality.  This lesion demonstrates minimal T2 hyperintensity and some signal dropout on in/out of phase imaging.  No associated diffusion restriction..     Biliary: Gallbladder contains sludge/stones.  No intrahepatic or extrahepatic biliary dilatation.     Pancreas: Unremarkable.  No pancreatic ductal dilatation.     Spleen: Normal size.  No focal lesions.     Adrenal glands: Unremarkable.     Kidneys: No renal masses.  No hydronephrosis.  Mild pelvic fullness on the right likely due to gravid uterus.     Miscellaneous: Partial visualization of a fetus within a gravid uterus.  Visualized bowel loops are unremarkable.  No evidence for lymphadenopathy.     Impression:     2.2 cm lesion in segment 6 as detailed above corresponding to the sonographic abnormality.  Lesion remains indeterminate without IV contrast.  Recommend contrasted liver MRI following delivery.     There is a background of hepatic steatosis.     Cholelithiasis without evidence of acute cholecystitis.    Patient was counseled on MRI findings of indeterminate liver lesion. Denies personal or family history of liver/kidney disease, hepatitis, HCC, or jaundice. I counseled the patient on the most common etiologies of liver lesions in young females: hemangioma, FNH, or adenoma. Denies risk factors associated with HCC. I counseled the patient that we are unable to provide significant recommendations for management given uncharacterized lesion. Recommend MRI with contrast postpartum as per Radiology recommendations. In interim, primary OB may wish to discuss or refer the patient to  hepatology to see if there is any additional counseling or characterization of the lesion they can perform. Patient counseled this is most likely a benign/incidental finding, but follow-up imaging is recommended. If this does represent an adenoma, patient advised to avoid hormonal contraception. At a size < 5 cm, this is unlikely to present significant obstetric complications if it does indeed represent an adenoma.    Please re-consult if additional information becomes available. Today's consult is limited by available work-up and evaluation.    Check LFTs periodically given chronic nausea/vomiting. Most recent labs are normal.

## 2024-02-26 NOTE — PROGRESS NOTES
MATERNAL-FETAL MEDICINE   CONSULT NOTE    Provider requesting consultation: Graciela Duque CNM    SUBJECTIVE:     Ms. Annalisa Dietrich is a 23 y.o.  female with IUP at 33w1d who is seen in consultation by MFM for evaluation and management of:  Problem   Lesion of Liver Greater Than 1 Cm in Diameter            Medication List with Changes/Refills   Current Medications    ALBUTEROL (VENTOLIN HFA) 90 MCG/ACTUATION INHALER    Inhale 2 puffs into the lungs every 6 (six) hours as needed for Wheezing. Rescue    ASPIRIN 81 MG CHEW    Take 1 tablet (81 mg total) by mouth once daily.         DOCUSATE SODIUM (COLACE) 100 MG CAPSULE    Take 1 capsule (100 mg total) by mouth 2 (two) times daily as needed for Constipation.         FAMOTIDINE (PEPCID) 40 MG TABLET    Take 1 tablet (40 mg total) by mouth once daily.    FERROUS SULFATE (FEOSOL) 325 MG (65 MG IRON) TAB TABLET    Take 1 tablet (325 mg total) by mouth every other day.                   METOCLOPRAMIDE HCL (REGLAN) 10 MG TABLET    Take 1 tablet (10 mg total) by mouth every 6 (six) hours.    ONDANSETRON (ZOFRAN) 4 MG TABLET    Take 1 tablet (4 mg total) by mouth every 6 (six) hours as needed for Nausea.    PRENATAL VIT NO.126-IRON-FOLIC (CLASSIC PRENATAL) 28 MG IRON- 800 MCG TAB    Take 1 tablet by mouth once daily.    PROMETHAZINE (PHENERGAN) 12.5 MG TAB    Take 1 tablet (12.5 mg total) by mouth every 4 (four) hours as needed (Nausea and vomiting).    PYRIDOXINE, VITAMIN B6, (B-6) 25 MG TAB    Take 1 tablet (25 mg total) by mouth every evening.       Review of patient's allergies indicates:  No Known Allergies    PMH:  Past Medical History:   Diagnosis Date    Anemia affecting pregnancy in first trimester 2023    Lesion of liver greater than 1 cm in diameter 2024    Migraine     Postpartum depression 02/10/2020       PObHx:  OB History    Para Term  AB Living   4 2 2 0 1 2   SAB IAB Ectopic Multiple Live Births   0 0 0   2      # Outcome Date GA  "Lbr Giovani/2nd Weight Sex Delivery Anes PTL Lv   4 Current            3 Term 19 39w5d  3.62 kg (7 lb 15.7 oz) M Vag-Spont EPI N ELINOR   2 AB            1 Term     F Vag-Spont   ELINOR       PSH:No past surgical history on file.    Family history:family history includes Cancer in her maternal grandmother; Hypertension in her maternal grandmother and mother.    Social history: reports that she has never smoked. She has never used smokeless tobacco. She reports that she does not drink alcohol and does not use drugs.    Genetic history: The patient denies any inherited genetic diseases or birth defects in herself or her partner's personal history or family.    Objective:   /77 (BP Location: Left arm, Patient Position: Sitting, BP Method: Medium (Automatic))   Ht 5' 9" (1.753 m)   Wt 107 kg (235 lb 14.3 oz)   BMI 34.84 kg/m²     Physical Exam    Significant labs/imaging:  Reviewed in [facility]    ASSESSMENT/PLAN:     23 y.o.  female with IUP at 33w1d     Lesion of liver greater than 1 cm in diameter  Abdominal US: 24  Impression:     Solid liver lesion.  MRI without and with contrast is recommended to further characterize this.  Malignancy is not excluded.  This is hypoechoic.     Sludge within the liver.     Hepatomegaly.    Abdominal MRI (24)  FINDINGS:  Liver: Hepatic steatosis.  2.2 cm lesion in segment 6 of the liver corresponding to the sonographic abnormality.  This lesion demonstrates minimal T2 hyperintensity and some signal dropout on in/out of phase imaging.  No associated diffusion restriction..     Biliary: Gallbladder contains sludge/stones.  No intrahepatic or extrahepatic biliary dilatation.     Pancreas: Unremarkable.  No pancreatic ductal dilatation.     Spleen: Normal size.  No focal lesions.     Adrenal glands: Unremarkable.     Kidneys: No renal masses.  No hydronephrosis.  Mild pelvic fullness on the right likely due to gravid uterus.     Miscellaneous: Partial visualization of a " fetus within a gravid uterus.  Visualized bowel loops are unremarkable.  No evidence for lymphadenopathy.     Impression:     2.2 cm lesion in segment 6 as detailed above corresponding to the sonographic abnormality.  Lesion remains indeterminate without IV contrast.  Recommend contrasted liver MRI following delivery.     There is a background of hepatic steatosis.     Cholelithiasis without evidence of acute cholecystitis.    Patient was counseled on MRI findings of indeterminate liver lesion. Denies personal or family history of liver/kidney disease, hepatitis, HCC, or jaundice. I counseled the patient on the most common etiologies of liver lesions in young females: hemangioma, FNH, or adenoma. Denies risk factors associated with HCC. I counseled the patient that we are unable to provide significant recommendations for management given uncharacterized lesion. Recommend MRI with contrast postpartum as per Radiology recommendations. In interim, primary OB may wish to discuss or refer the patient to hepatology to see if there is any additional counseling or characterization of the lesion they can perform. Patient counseled this is most likely a benign/incidental finding, but follow-up imaging is recommended. If this does represent an adenoma, patient advised to avoid hormonal contraception. At a size < 5 cm, this is unlikely to present significant obstetric complications if it does indeed represent an adenoma.    Please re-consult if additional information becomes available. Today's consult is limited by available work-up and evaluation.    Check LFTs periodically given chronic nausea/vomiting. Most recent labs are normal.      FOLLOW UP:   No further ultrasounds or visits were scheduled      30 minutes of total time spent on the encounter, which includes face to face time and non-face to face time preparing to see the patient (eg, review of tests), obtaining and/or reviewing separately obtained history, documenting  clinical information in the electronic or other health record, independently interpreting results (not separately reported) and communicating results to the patient/family/caregiver, or care coordination (not separately reported).      Sherri Hyatt  Maternal-Fetal Medicine    Electronically Signed by Sherri Hyatt February 26, 2024

## 2024-02-28 DIAGNOSIS — R16.0 LIVER MASS: Primary | ICD-10-CM

## 2024-02-29 LAB
OHS QRS DURATION: 90 MS
OHS QTC CALCULATION: 436 MS

## 2024-03-05 ENCOUNTER — HOSPITAL ENCOUNTER (EMERGENCY)
Facility: OTHER | Age: 24
Discharge: HOME | End: 2024-03-05
Attending: OBSTETRICS & GYNECOLOGY
Payer: MEDICAID

## 2024-03-05 ENCOUNTER — TELEPHONE (OUTPATIENT)
Dept: OBSTETRICS AND GYNECOLOGY | Facility: CLINIC | Age: 24
End: 2024-03-05
Payer: MEDICAID

## 2024-03-05 VITALS
SYSTOLIC BLOOD PRESSURE: 94 MMHG | OXYGEN SATURATION: 98 % | DIASTOLIC BLOOD PRESSURE: 55 MMHG | TEMPERATURE: 99 F | HEART RATE: 78 BPM

## 2024-03-05 DIAGNOSIS — K92.0 HEMATEMESIS, UNSPECIFIED WHETHER NAUSEA PRESENT: Primary | ICD-10-CM

## 2024-03-05 DIAGNOSIS — Z3A.34 34 WEEKS GESTATION OF PREGNANCY: ICD-10-CM

## 2024-03-05 LAB
ALBUMIN SERPL BCP-MCNC: 2.9 G/DL (ref 3.5–5.2)
ALP SERPL-CCNC: 140 U/L (ref 55–135)
ALT SERPL W/O P-5'-P-CCNC: 23 U/L (ref 10–44)
ANION GAP SERPL CALC-SCNC: 10 MMOL/L (ref 8–16)
AST SERPL-CCNC: 30 U/L (ref 10–40)
BASOPHILS # BLD AUTO: 0.04 K/UL (ref 0–0.2)
BASOPHILS NFR BLD: 0.5 % (ref 0–1.9)
BILIRUB SERPL-MCNC: 0.4 MG/DL (ref 0.1–1)
BUN SERPL-MCNC: 4 MG/DL (ref 6–20)
CALCIUM SERPL-MCNC: 8.7 MG/DL (ref 8.7–10.5)
CHLORIDE SERPL-SCNC: 105 MMOL/L (ref 95–110)
CO2 SERPL-SCNC: 21 MMOL/L (ref 23–29)
CREAT SERPL-MCNC: 0.6 MG/DL (ref 0.5–1.4)
DIFFERENTIAL METHOD BLD: ABNORMAL
EOSINOPHIL # BLD AUTO: 0.1 K/UL (ref 0–0.5)
EOSINOPHIL NFR BLD: 1.1 % (ref 0–8)
ERYTHROCYTE [DISTWIDTH] IN BLOOD BY AUTOMATED COUNT: 13.2 % (ref 11.5–14.5)
EST. GFR  (NO RACE VARIABLE): >60 ML/MIN/1.73 M^2
GLUCOSE SERPL-MCNC: 76 MG/DL (ref 70–110)
HCT VFR BLD AUTO: 32.3 % (ref 37–48.5)
HGB BLD-MCNC: 10.8 G/DL (ref 12–16)
IMM GRANULOCYTES # BLD AUTO: 0.05 K/UL (ref 0–0.04)
IMM GRANULOCYTES NFR BLD AUTO: 0.6 % (ref 0–0.5)
LYMPHOCYTES # BLD AUTO: 1.8 K/UL (ref 1–4.8)
LYMPHOCYTES NFR BLD: 21.1 % (ref 18–48)
MAGNESIUM SERPL-MCNC: 1.8 MG/DL (ref 1.6–2.6)
MCH RBC QN AUTO: 28.5 PG (ref 27–31)
MCHC RBC AUTO-ENTMCNC: 33.4 G/DL (ref 32–36)
MCV RBC AUTO: 85 FL (ref 82–98)
MONOCYTES # BLD AUTO: 0.6 K/UL (ref 0.3–1)
MONOCYTES NFR BLD: 7.6 % (ref 4–15)
NEUTROPHILS # BLD AUTO: 5.8 K/UL (ref 1.8–7.7)
NEUTROPHILS NFR BLD: 69.1 % (ref 38–73)
NRBC BLD-RTO: 0 /100 WBC
PHOSPHATE SERPL-MCNC: 2.7 MG/DL (ref 2.7–4.5)
PLATELET # BLD AUTO: 294 K/UL (ref 150–450)
PMV BLD AUTO: 10.1 FL (ref 9.2–12.9)
POTASSIUM SERPL-SCNC: 3.6 MMOL/L (ref 3.5–5.1)
PROT SERPL-MCNC: 6.6 G/DL (ref 6–8.4)
RBC # BLD AUTO: 3.79 M/UL (ref 4–5.4)
SODIUM SERPL-SCNC: 136 MMOL/L (ref 136–145)
WBC # BLD AUTO: 8.37 K/UL (ref 3.9–12.7)

## 2024-03-05 PROCEDURE — 85025 COMPLETE CBC W/AUTO DIFF WBC: CPT

## 2024-03-05 PROCEDURE — 63600175 PHARM REV CODE 636 W HCPCS: Performed by: OBSTETRICS & GYNECOLOGY

## 2024-03-05 PROCEDURE — 84100 ASSAY OF PHOSPHORUS: CPT

## 2024-03-05 PROCEDURE — 80053 COMPREHEN METABOLIC PANEL: CPT

## 2024-03-05 PROCEDURE — 63600175 PHARM REV CODE 636 W HCPCS

## 2024-03-05 PROCEDURE — 59025 FETAL NON-STRESS TEST: CPT

## 2024-03-05 PROCEDURE — 96374 THER/PROPH/DIAG INJ IV PUSH: CPT

## 2024-03-05 PROCEDURE — 83735 ASSAY OF MAGNESIUM: CPT

## 2024-03-05 PROCEDURE — 59025 FETAL NON-STRESS TEST: CPT | Mod: 26,,, | Performed by: OBSTETRICS & GYNECOLOGY

## 2024-03-05 PROCEDURE — 99284 EMERGENCY DEPT VISIT MOD MDM: CPT | Mod: 25

## 2024-03-05 PROCEDURE — 99284 EMERGENCY DEPT VISIT MOD MDM: CPT | Mod: 25,,, | Performed by: OBSTETRICS & GYNECOLOGY

## 2024-03-05 RX ORDER — ONDANSETRON HYDROCHLORIDE 2 MG/ML
4 INJECTION, SOLUTION INTRAVENOUS ONCE
Status: COMPLETED | OUTPATIENT
Start: 2024-03-05 | End: 2024-03-05

## 2024-03-05 RX ORDER — SODIUM CHLORIDE, SODIUM LACTATE, POTASSIUM CHLORIDE, CALCIUM CHLORIDE 600; 310; 30; 20 MG/100ML; MG/100ML; MG/100ML; MG/100ML
INJECTION, SOLUTION INTRAVENOUS CONTINUOUS
Status: DISCONTINUED | OUTPATIENT
Start: 2024-03-05 | End: 2024-03-05 | Stop reason: HOSPADM

## 2024-03-05 RX ORDER — ESOMEPRAZOLE MAGNESIUM 40 MG/1
40 CAPSULE, DELAYED RELEASE ORAL
Qty: 30 CAPSULE | Refills: 11 | Status: ON HOLD | OUTPATIENT
Start: 2024-03-05 | End: 2024-06-18

## 2024-03-05 RX ADMIN — ONDANSETRON 4 MG: 2 INJECTION INTRAMUSCULAR; INTRAVENOUS at 01:03

## 2024-03-05 RX ADMIN — SODIUM CHLORIDE, POTASSIUM CHLORIDE, SODIUM LACTATE AND CALCIUM CHLORIDE: 600; 310; 30; 20 INJECTION, SOLUTION INTRAVENOUS at 01:03

## 2024-03-05 NOTE — DISCHARGE INSTRUCTIONS
Call clinic ([phone]) or Labor & Delivery ([phone]) for any of the following:   - Vaginal bleeding   - Leakage of fluid  - Contractions 3-5 minutes apart for 2 hours or more  - Decreased fetal movement (10 or more movements in 2 hours, beginning at 28 weeks gestation)   - Headache unrelieved by Tylenol, pain underneath your right breast, or blurry vision  - Temperature of 100.4 or greater

## 2024-03-05 NOTE — TELEPHONE ENCOUNTER
Spoke with pt. Vomited dark blood yesterday 1x. Bright red 2x Last time this happened she was told it was due to her throat being irritated from the hyperemesis. Spoke with CNM on call advised to direct pt to the [facility]. Pt JOSE will head to [facility] now. Coming from [place].

## 2024-03-05 NOTE — ED PROVIDER NOTES
Encounter Date: 3/5/2024       History     Chief Complaint   Patient presents with    Hematemesis     HPI    Annalisa Dietrich is a 23 y.o. [id number] at 34w2d presents complaining of hematemesis. She has a long history of emesis during pregnancies. She says that at baseline she vomits 3-8 times per day. She takes  Zofran, phenergan and Reglan at home.  Patient says that last 4 times she vomited, she noted blood in the vomit. It was bright red and a moderate amount, size of a golf ball.     Patient denies contractions, denies vaginal bleeding, denies LOF. Fetal Movement: normal.   This IUP is complicated by Weigh loss, hyperemesis, 1.9cm liver lesion- MFM consult, MRI 2.2 cm lesion-- needs MRI w contrast PP.    Review of patient's allergies indicates:  No Known Allergies  Past Medical History:   Diagnosis Date    Anemia affecting pregnancy in first trimester 2023    Lesion of liver greater than 1 cm in diameter 2024    Migraine     Postpartum depression 02/10/2020     No past surgical history on file.  Family History   Problem Relation Age of Onset    Cancer Maternal Grandmother     Hypertension Maternal Grandmother     Hypertension Mother     Arrhythmia Neg Hx     Cardiomyopathy Neg Hx     Congenital heart disease Neg Hx     Heart attacks under age 50 Neg Hx     Pacemaker/defibrilator Neg Hx      Social History     Tobacco Use    Smoking status: Never    Smokeless tobacco: Never   Substance Use Topics    Alcohol use: No    Drug use: No     Review of Systems   Constitutional:  Negative for chills, diaphoresis and fever.   HENT:  Negative for congestion.    Eyes:  Negative for visual disturbance.   Respiratory:  Positive for cough. Negative for shortness of breath.    Cardiovascular:  Negative for chest pain and leg swelling.   Gastrointestinal:  Positive for nausea. Negative for abdominal pain, constipation and vomiting.   Genitourinary:  Negative for dysuria, vaginal bleeding and vaginal discharge.    Musculoskeletal:  Negative for back pain.   Skin:  Negative for rash.   Neurological:  Negative for light-headedness and headaches.   Psychiatric/Behavioral:  Negative for confusion.        Physical Exam     Initial Vitals   BP Pulse Resp Temp SpO2   03/05/24 1239 03/05/24 1238 -- 03/05/24 1328 03/05/24 1238   (!) 114/57 93  98.5 °F (36.9 °C) 95 %      MAP       --                Physical Exam    Vitals reviewed.  Constitutional: She appears well-developed and well-nourished. She is not diaphoretic. No distress.   HENT:   Head: Normocephalic and atraumatic.   Eyes: EOM are normal.   Cardiovascular:  Normal rate.           Pulmonary/Chest: Breath sounds normal. No respiratory distress.   Abdominal: Abdomen is soft. There is no abdominal tenderness. There is no rebound and no guarding.     Neurological: She is alert and oriented to person, place, and time.   Skin: Skin is warm and dry.   Psychiatric: She has a normal mood and affect. Thought content normal.         ED Course   Fetal non-stress test    Date/Time: 3/5/2024 1:02 PM    Performed by: Elvis Greco MD  Authorized by: Elvis Grceo MD    Nonstress Test:     Variability:  6-25 BPM    Decelerations:  None    Accelerations:  15 bpm    Acoustic Stimulator: No      Baseline:  125    Uterine Irritability: No      Contractions:  Not present  Biophysical Profile:     Nonstress Test Interpretation: reactive      Overall Impression:  Reassuring  Post-procedure:     Patient tolerance:  Patient tolerated the procedure well with no immediate complications    Labs Reviewed   CBC W/ AUTO DIFFERENTIAL - Abnormal; Notable for the following components:       Result Value    RBC 3.79 (*)     Hemoglobin 10.8 (*)     Hematocrit 32.3 (*)     Immature Granulocytes 0.6 (*)     Immature Grans (Abs) 0.05 (*)     All other components within normal limits   COMPREHENSIVE METABOLIC PANEL - Abnormal; Notable for the following components:    CO2 21 (*)     BUN 4 (*)      Albumin 2.9 (*)     Alkaline Phosphatase 140 (*)     All other components within normal limits   MAGNESIUM   PHOSPHORUS          Imaging Results    None          Medications   ondansetron injection 4 mg (4 mg Intravenous Given 3/5/24 1312)     Medical Decision Making  Hematemesis, painless.  - VSS  - Zofran improved the nausea  - small LR bolus in [facility]  - CBC, CMP, Mg, Phos within normal limits.  - History concerning for esophageal irritation secondary to frequent vomiting and worsening acid reflux.  - Referral for outpatient GI discussed with patient. Referral placed.  - DC with esomeprazole daily, patient already takes reglan, zofran and phenergan for nausea and vomiting.      Amount and/or Complexity of Data Reviewed  Labs: ordered.    Risk  Prescription drug management.              Attending Attestation:   Physician Attestation Statement for Resident:  As the supervising MD   Physician Attestation Statement: I have personally seen and examined this patient.   I agree with the above history.  -:   As the supervising MD I agree with the above PE.     As the supervising MD I agree with the above treatment, course, plan, and disposition.   -: NST reactive and reassuring, toco with no contractions.  Patient with long standing N/V with hematemesis.  Most likely Susie-Lang tears.  No coffee ground emesis, no diarrhea, no previous EGD.  No emesis in the [facility].  Nausea improved with zofran.  Rx sent to pharmacy for nexium.  Agree with discharge to home.  Will f/u with OB in 1 week.    Savannah Cruz MD,ADELINA  Date and Time: 03/05/2024 9:15 PM  OB Hospitalist  GI consult outpatient.  I was personally present during the critical portions of the procedure(s) performed by the resident and was immediately available in the ED to provide services and assistance as needed during the entire procedure.   I have reviewed the following: old records at this facility.                                       Clinical  Impression:  Final diagnoses:  [K92.0] Hematemesis, unspecified whether nausea present (Primary)  [Z3A.34] 34 weeks gestation of pregnancy          ED Disposition Condition    Discharge Stable          ED Prescriptions       Medication Sig Dispense Start Date End Date Auth. Provider    esomeprazole (NEXIUM) 40 MG capsule Take 1 capsule (40 mg total) by mouth before breakfast. 30 capsule 3/5/2024 3/5/2025 Elvis Greco MD          Follow-up Information    None          Elvis Greco MD  Resident  03/05/24 1420       Savannah Cruz MD  03/05/24 180       Savannah Cruz MD  03/05/24 2362

## 2024-03-05 NOTE — TELEPHONE ENCOUNTER
Pt calling with nausea/vomiting received message from call center.Pt has hyperemesis. Sent secure chat to midwife on call to direct on what to do next.

## 2024-03-07 ENCOUNTER — ROUTINE PRENATAL (OUTPATIENT)
Dept: OBSTETRICS AND GYNECOLOGY | Facility: CLINIC | Age: 24
End: 2024-03-07
Payer: MEDICAID

## 2024-03-07 ENCOUNTER — INFUSION (OUTPATIENT)
Dept: INFUSION THERAPY | Facility: OTHER | Age: 24
End: 2024-03-07
Attending: FAMILY MEDICINE
Payer: MEDICAID

## 2024-03-07 ENCOUNTER — TELEPHONE (OUTPATIENT)
Dept: OBSTETRICS AND GYNECOLOGY | Facility: CLINIC | Age: 24
End: 2024-03-07

## 2024-03-07 VITALS
RESPIRATION RATE: 16 BRPM | TEMPERATURE: 98 F | HEART RATE: 91 BPM | SYSTOLIC BLOOD PRESSURE: 137 MMHG | OXYGEN SATURATION: 100 % | DIASTOLIC BLOOD PRESSURE: 62 MMHG

## 2024-03-07 VITALS
SYSTOLIC BLOOD PRESSURE: 124 MMHG | HEART RATE: 87 BPM | DIASTOLIC BLOOD PRESSURE: 72 MMHG | BODY MASS INDEX: 34.92 KG/M2 | WEIGHT: 236.44 LBS

## 2024-03-07 DIAGNOSIS — O21.9 NAUSEA AND VOMITING DURING PREGNANCY: Primary | ICD-10-CM

## 2024-03-07 DIAGNOSIS — Z34.83 ENCOUNTER FOR SUPERVISION OF OTHER NORMAL PREGNANCY IN THIRD TRIMESTER: Primary | ICD-10-CM

## 2024-03-07 DIAGNOSIS — O21.0 HYPEREMESIS AFFECTING PREGNANCY, ANTEPARTUM: Primary | ICD-10-CM

## 2024-03-07 PROCEDURE — 96361 HYDRATE IV INFUSION ADD-ON: CPT

## 2024-03-07 PROCEDURE — 25000003 PHARM REV CODE 250

## 2024-03-07 PROCEDURE — 99213 OFFICE O/P EST LOW 20 MIN: CPT | Mod: PBBFAC,25,TH | Performed by: ADVANCED PRACTICE MIDWIFE

## 2024-03-07 PROCEDURE — 99213 OFFICE O/P EST LOW 20 MIN: CPT | Mod: S$PBB,TH,, | Performed by: ADVANCED PRACTICE MIDWIFE

## 2024-03-07 PROCEDURE — 96360 HYDRATION IV INFUSION INIT: CPT

## 2024-03-07 PROCEDURE — 99999 PR PBB SHADOW E&M-EST. PATIENT-LVL III: CPT | Mod: PBBFAC,,, | Performed by: ADVANCED PRACTICE MIDWIFE

## 2024-03-07 RX ORDER — SODIUM CHLORIDE 0.9 % (FLUSH) 0.9 %
10 SYRINGE (ML) INJECTION
Status: CANCELLED | OUTPATIENT
Start: 2024-03-07

## 2024-03-07 RX ORDER — ONDANSETRON HYDROCHLORIDE 2 MG/ML
4 INJECTION, SOLUTION INTRAVENOUS EVERY 6 HOURS PRN
Status: DISCONTINUED | OUTPATIENT
Start: 2024-03-07 | End: 2024-06-18 | Stop reason: HOSPADM

## 2024-03-07 RX ORDER — HEPARIN 100 UNIT/ML
500 SYRINGE INTRAVENOUS
Status: CANCELLED | OUTPATIENT
Start: 2024-03-07

## 2024-03-07 RX ADMIN — SODIUM CHLORIDE 1000 ML: 9 INJECTION, SOLUTION INTRAVENOUS at 09:03

## 2024-03-07 NOTE — PROGRESS NOTES
23 y.o.,  at 34w4d, presents for routine OB appt.  Denies LOF, VB, or UCs. Reports good FM.  Doing well today, with no new  concerns.   TWG: -20 lbs     PHYSICAL EXAM  GENERAL: No acute distress  HEENT: Normocephalic, atraumatic  NEURO: Alert and oriented x3  PULMONARY: Non-labored respiration; no tachypnea  ABD: Soft, gravid, nontender.    ASSESSMENT AND PLAN  Encounter for supervision of other normal pregnancy in third trimester      Reviewed upcoming 36wk labs ordered.   Reviewed patient does not have a history of genital HSV.     Reviewed ABC risk assessment with the patient:  Birth Center Risk Assessment: 0- Meets birth center guidelines  CNM management in ABC  CNM management on L&D  Consultation with OB to develop  plan of care  Collaborative CNM/OB management with delivery on L&D  Permanent referral of care to MD  Patient understands is currently  a candidate for the ABC. Reviewed potential risks which could arise, that could change this status.    Reviewed warning signs, normal FM,  labor precautions, and how/when to call.  Confirmed pt has after-hours number.    Follow-up: 2 weeks, call or present sooner PRDUNIA Garrido, MARK, MS

## 2024-03-07 NOTE — PLAN OF CARE
Problem: Hyperemesis Gravidarum  Goal: Nausea and Vomiting Relief  Outcome: Ongoing, Progressing     Problem: Adult Inpatient Plan of Care  Goal: Plan of Care Review  Outcome: Ongoing, Progressing  Goal: Optimal Comfort and Wellbeing  Outcome: Ongoing, Progressing       Patient presented today for iv 1L normal saline infusion. PIV started to LAC and infusion given with no issues. VS remained stable throughout visit and assessment provided no issues. PIV removed w/o complications and all questions answered. Patient scheduled for next appt on 3-15-24 and left clinic ambulatory in no acute distress.

## 2024-03-07 NOTE — TELEPHONE ENCOUNTER
Called patient back - she requests standing order for antiemetic to be given with infusion when she gets her saline infusions for hyperemesis. Order placed.     ----- Message from Jennifer Craig MA sent at 3/7/2024  9:51 AM CST -----  Contact: kailee    ----- Message -----  From: Kayla Morales  Sent: 3/7/2024   9:30 AM CST  To: [facility] Staff    Type:  Patient Call          Who Called: patient         Does the patient know what this is regarding?: Requesting a call back about having a order for nausea medication ;pt is at the infusion center now ; please advise           Would the patient rather a call back or a response via [facility]?call           Best Call Back Number: [phone]             Additional Information:

## 2024-03-10 ENCOUNTER — PATIENT MESSAGE (OUTPATIENT)
Dept: OTHER | Facility: OTHER | Age: 24
End: 2024-03-10
Payer: MEDICAID

## 2024-03-15 ENCOUNTER — INFUSION (OUTPATIENT)
Dept: INFUSION THERAPY | Facility: OTHER | Age: 24
End: 2024-03-15
Attending: FAMILY MEDICINE
Payer: MEDICAID

## 2024-03-15 VITALS
TEMPERATURE: 98 F | RESPIRATION RATE: 20 BRPM | DIASTOLIC BLOOD PRESSURE: 60 MMHG | SYSTOLIC BLOOD PRESSURE: 112 MMHG | HEART RATE: 109 BPM | OXYGEN SATURATION: 99 %

## 2024-03-15 DIAGNOSIS — O21.0 HYPEREMESIS AFFECTING PREGNANCY, ANTEPARTUM: Primary | ICD-10-CM

## 2024-03-15 PROCEDURE — 96374 THER/PROPH/DIAG INJ IV PUSH: CPT

## 2024-03-15 PROCEDURE — 96361 HYDRATE IV INFUSION ADD-ON: CPT

## 2024-03-15 PROCEDURE — 96366 THER/PROPH/DIAG IV INF ADDON: CPT

## 2024-03-15 PROCEDURE — 25000003 PHARM REV CODE 250

## 2024-03-15 PROCEDURE — 63600175 PHARM REV CODE 636 W HCPCS

## 2024-03-15 RX ORDER — ONDANSETRON HYDROCHLORIDE 2 MG/ML
4 INJECTION, SOLUTION INTRAVENOUS ONCE
Status: COMPLETED | OUTPATIENT
Start: 2024-03-15 | End: 2024-03-15

## 2024-03-15 RX ORDER — HEPARIN 100 UNIT/ML
500 SYRINGE INTRAVENOUS
Status: CANCELLED | OUTPATIENT
Start: 2024-03-15

## 2024-03-15 RX ORDER — SODIUM CHLORIDE 0.9 % (FLUSH) 0.9 %
10 SYRINGE (ML) INJECTION
Status: CANCELLED | OUTPATIENT
Start: 2024-03-15

## 2024-03-15 RX ADMIN — SODIUM CHLORIDE 1000 ML: 9 INJECTION, SOLUTION INTRAVENOUS at 11:03

## 2024-03-15 RX ADMIN — ONDANSETRON 4 MG: 2 INJECTION INTRAMUSCULAR; INTRAVENOUS at 11:03

## 2024-03-15 NOTE — PLAN OF CARE
Vital Signs Stable, No apparent distress noted; Pt tolerated _IVF/Zofran_ infusion w/o difficulty.  24g piv removed;No bleeding,swelling or drainage noted to the site.  AVS/Discharge instructions given;all questions answered;Pt verbalizes understanding. Next appointments scheduled and sent via mychart; ambulated from clinic in NAD

## 2024-03-20 ENCOUNTER — TELEPHONE (OUTPATIENT)
Dept: OBSTETRICS AND GYNECOLOGY | Facility: CLINIC | Age: 24
End: 2024-03-20
Payer: MEDICAID

## 2024-03-20 NOTE — TELEPHONE ENCOUNTER
Returned patient's call. Patient desires an IOL as soon as possible due to continued hyperemesis. Discussed medical indications for delivery and timing. Discussed that we cannot proceed with IOL prior to 39 weeks in most cases. Will bring patient's case to patient review with medical director tomorrow. Discussed that if she cannot keep anything down and needs to be seen immediately, to go to [facility] for fluids and medications. Patient verbalized understanding.

## 2024-03-22 ENCOUNTER — ROUTINE PRENATAL (OUTPATIENT)
Dept: OBSTETRICS AND GYNECOLOGY | Facility: CLINIC | Age: 24
End: 2024-03-22
Payer: MEDICAID

## 2024-03-22 ENCOUNTER — INFUSION (OUTPATIENT)
Dept: INFUSION THERAPY | Facility: OTHER | Age: 24
End: 2024-03-22
Attending: FAMILY MEDICINE
Payer: MEDICAID

## 2024-03-22 VITALS
DIASTOLIC BLOOD PRESSURE: 67 MMHG | BODY MASS INDEX: 34.88 KG/M2 | SYSTOLIC BLOOD PRESSURE: 132 MMHG | HEART RATE: 83 BPM | WEIGHT: 236.25 LBS

## 2024-03-22 VITALS
DIASTOLIC BLOOD PRESSURE: 61 MMHG | OXYGEN SATURATION: 100 % | SYSTOLIC BLOOD PRESSURE: 129 MMHG | RESPIRATION RATE: 16 BRPM | HEART RATE: 72 BPM | TEMPERATURE: 98 F

## 2024-03-22 DIAGNOSIS — Z3A.36 36 WEEKS GESTATION OF PREGNANCY: Primary | ICD-10-CM

## 2024-03-22 DIAGNOSIS — O21.0 HYPEREMESIS AFFECTING PREGNANCY, ANTEPARTUM: Primary | ICD-10-CM

## 2024-03-22 PROCEDURE — 99213 OFFICE O/P EST LOW 20 MIN: CPT | Mod: PBBFAC,25

## 2024-03-22 PROCEDURE — 87081 CULTURE SCREEN ONLY: CPT

## 2024-03-22 PROCEDURE — 96360 HYDRATION IV INFUSION INIT: CPT

## 2024-03-22 PROCEDURE — 87147 CULTURE TYPE IMMUNOLOGIC: CPT

## 2024-03-22 PROCEDURE — 99999 PR PBB SHADOW E&M-EST. PATIENT-LVL III: CPT | Mod: PBBFAC,,,

## 2024-03-22 PROCEDURE — 99213 OFFICE O/P EST LOW 20 MIN: CPT | Mod: S$PBB,TH,,

## 2024-03-22 PROCEDURE — 25000003 PHARM REV CODE 250

## 2024-03-22 RX ORDER — SODIUM CHLORIDE 0.9 % (FLUSH) 0.9 %
10 SYRINGE (ML) INJECTION
OUTPATIENT
Start: 2024-03-22

## 2024-03-22 RX ORDER — HEPARIN 100 UNIT/ML
500 SYRINGE INTRAVENOUS
OUTPATIENT
Start: 2024-03-22

## 2024-03-22 RX ADMIN — SODIUM CHLORIDE 1000 ML: 9 INJECTION, SOLUTION INTRAVENOUS at 09:03

## 2024-03-22 NOTE — PLAN OF CARE
Problem: Hyperemesis Gravidarum  Goal: Nausea and Vomiting Relief  Outcome: Ongoing, Progressing     Problem: Adult Inpatient Plan of Care  Goal: Plan of Care Review  Outcome: Ongoing, Progressing  Goal: Optimal Comfort and Wellbeing  Outcome: Ongoing, Progressing       Patient presented today for iv 1L normal saline infusion. PIV started to R hand and infusion given with no issues. VS remained stable throughout visit and assessment provided no issues. PIV removed w/o complications, all questions answered and left clinic ambulatory in no acute distress.

## 2024-03-22 NOTE — PROGRESS NOTES
24 y.o.,  at 36w5d here today for routine ob appt.    Complaints today: feeling better today than has for the past week. Had infusion appointment today with fluids.  Doing well without concerns. Denies LOF, VB, and cramping or regular contractions. Denies dysuria and any other s/s UTI. Denies HA, visual disturbances, and upper abdominal pain. Reports good fetal movement.    Verified no history of genital HSV.  TWG: -20 lbs     PHYSICAL EXAM  GENERAL: No acute distress  HEENT: Normocephalic, atraumatic  NEURO: Alert and oriented x3  PULMONARY: Non-labored respiration; no tachypnea  ABD: Soft, gravid, nontender. Vertex per Leopold's.    ASSESSMENT AND PLAN  36 weeks gestation of pregnancy  -     HIV 1/2 Ag/Ab (4th Gen); Future; Expected date: 2024  -     RPR; Future; Expected date: 2024  -     STREP B SCREEN, VAGINAL / RECTAL      GBS collected today. Reviewed Allergies: Patient has no known allergies.  Reviewed [place] department of Health recommendation for repeat HIV/RPR today; patient will have drawn. Ordered.  20 lb weight loss due to HEG. Growth US ordered. Discussed IOL with patient -- patient strongly desires IOL as soon as possible. Will schedule with next visit.      Reviewed warning signs, normal FM,  labor precautions, and how/when to call.      Follow-up: 1 week, call or present sooner ANNIKA Morgan CNM/LOWELL

## 2024-03-23 LAB — BACTERIA SPEC AEROBE CULT: ABNORMAL

## 2024-03-26 ENCOUNTER — TELEPHONE (OUTPATIENT)
Dept: OBSTETRICS AND GYNECOLOGY | Facility: CLINIC | Age: 24
End: 2024-03-26
Payer: MEDICAID

## 2024-03-26 ENCOUNTER — PROCEDURE VISIT (OUTPATIENT)
Dept: MATERNAL FETAL MEDICINE | Facility: CLINIC | Age: 24
End: 2024-03-26
Payer: MEDICAID

## 2024-03-26 DIAGNOSIS — Z3A.36 36 WEEKS GESTATION OF PREGNANCY: ICD-10-CM

## 2024-03-26 PROCEDURE — 76816 OB US FOLLOW-UP PER FETUS: CPT | Mod: PBBFAC | Performed by: OBSTETRICS & GYNECOLOGY

## 2024-03-26 NOTE — TELEPHONE ENCOUNTER
I spoke with Annalisa today regarding her request for induction of labor. She is now 37 + 2/7 weeks and desires IOL due to hyperemesis. She did have growth US today, report is pending. FM is good. Denies S&S of pre eclampsia. No regular cramps/contractions.   I explained to her that I am not able schedule her for an IOL as she is less than 39 weeks. She does have an OB appointment in 2 days. She can discuss with CNM she see her request at that time or if she is not able to tolerate food/fluid, come to [facility] for eval.     Florinda Stevens CNM

## 2024-03-27 ENCOUNTER — HOSPITAL ENCOUNTER (EMERGENCY)
Facility: OTHER | Age: 24
Discharge: HOME | End: 2024-03-27
Attending: OBSTETRICS & GYNECOLOGY
Payer: MEDICAID

## 2024-03-27 VITALS
WEIGHT: 236.31 LBS | HEART RATE: 80 BPM | HEIGHT: 69 IN | OXYGEN SATURATION: 99 % | RESPIRATION RATE: 16 BRPM | BODY MASS INDEX: 35 KG/M2 | TEMPERATURE: 98 F | DIASTOLIC BLOOD PRESSURE: 76 MMHG | SYSTOLIC BLOOD PRESSURE: 130 MMHG

## 2024-03-27 DIAGNOSIS — Z3A.37 37 WEEKS GESTATION OF PREGNANCY: ICD-10-CM
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
## 2024-03-27 PROCEDURE — 99283 EMERGENCY DEPT VISIT LOW MDM: CPT | Mod: 25,,, | Performed by: OBSTETRICS & GYNECOLOGY

## 2024-03-27 PROCEDURE — 99284 EMERGENCY DEPT VISIT MOD MDM: CPT | Mod: 25

## 2024-03-27 PROCEDURE — 59025 FETAL NON-STRESS TEST: CPT

## 2024-03-27 PROCEDURE — 59025 FETAL NON-STRESS TEST: CPT | Mod: 26,,, | Performed by: OBSTETRICS & GYNECOLOGY
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
## 2024-03-27 NOTE — DISCHARGE INSTRUCTIONS
Please call Labor and Delivery [phone] or return to the [facility] if you have:  -Leakage of fluid like your water broke  -Vaginal bleeding (more than spotting)  -Baby is not moving normally  -Contractions every 3-5 minutes for 2 hours

## 2024-03-28 ENCOUNTER — ROUTINE PRENATAL (OUTPATIENT)
Dept: OBSTETRICS AND GYNECOLOGY | Facility: CLINIC | Age: 24
End: 2024-03-28
Payer: MEDICAID

## 2024-03-28 ENCOUNTER — LAB VISIT (OUTPATIENT)
Dept: LAB | Facility: OTHER | Age: 24
End: 2024-03-28
Payer: MEDICAID

## 2024-03-28 VITALS
DIASTOLIC BLOOD PRESSURE: 66 MMHG | SYSTOLIC BLOOD PRESSURE: 122 MMHG | WEIGHT: 227.75 LBS | BODY MASS INDEX: 33.63 KG/M2 | HEART RATE: 98 BPM

## 2024-03-28 DIAGNOSIS — O21.0 HYPEREMESIS AFFECTING PREGNANCY, ANTEPARTUM: ICD-10-CM

## 2024-03-28 DIAGNOSIS — Z3A.37 37 WEEKS GESTATION OF PREGNANCY: ICD-10-CM

## 2024-03-28 DIAGNOSIS — O99.213 OBESITY AFFECTING PREGNANCY IN THIRD TRIMESTER, UNSPECIFIED OBESITY TYPE: ICD-10-CM

## 2024-03-28 DIAGNOSIS — O99.013 ANEMIA AFFECTING PREGNANCY IN THIRD TRIMESTER: ICD-10-CM

## 2024-03-28 DIAGNOSIS — K76.9 LESION OF LIVER GREATER THAN 1 CM IN DIAMETER: ICD-10-CM

## 2024-03-28 DIAGNOSIS — Z34.83 MULTIGRAVIDA IN THIRD TRIMESTER: ICD-10-CM

## 2024-03-28 DIAGNOSIS — O99.820 GBS (GROUP B STREPTOCOCCUS CARRIER), +RV CULTURE, CURRENTLY PREGNANT: Primary | ICD-10-CM

## 2024-03-28 DIAGNOSIS — R63.4 WEIGHT LOSS, UNINTENTIONAL: ICD-10-CM

## 2024-03-28 LAB
ALBUMIN SERPL BCP-MCNC: 3 G/DL (ref 3.5–5.2)
ALP SERPL-CCNC: 196 U/L (ref 55–135)
ALT SERPL W/O P-5'-P-CCNC: 36 U/L (ref 10–44)
ANION GAP SERPL CALC-SCNC: 8 MMOL/L (ref 8–16)
AST SERPL-CCNC: 45 U/L (ref 10–40)
BILIRUB SERPL-MCNC: 0.5 MG/DL (ref 0.1–1)
BUN SERPL-MCNC: 5 MG/DL (ref 6–20)
CALCIUM SERPL-MCNC: 8.9 MG/DL (ref 8.7–10.5)
CHLORIDE SERPL-SCNC: 106 MMOL/L (ref 95–110)
CO2 SERPL-SCNC: 24 MMOL/L (ref 23–29)
CREAT SERPL-MCNC: 0.7 MG/DL (ref 0.5–1.4)
EST. GFR  (NO RACE VARIABLE): >60 ML/MIN/1.73 M^2
GLUCOSE SERPL-MCNC: 78 MG/DL (ref 70–110)
POTASSIUM SERPL-SCNC: 4.3 MMOL/L (ref 3.5–5.1)
PROT SERPL-MCNC: 6.9 G/DL (ref 6–8.4)
RPR SER QL: NORMAL
SODIUM SERPL-SCNC: 138 MMOL/L (ref 136–145)

## 2024-03-28 PROCEDURE — 87389 HIV-1 AG W/HIV-1&-2 AB AG IA: CPT

## 2024-03-28 PROCEDURE — 99213 OFFICE O/P EST LOW 20 MIN: CPT | Mod: PBBFAC,TH

## 2024-03-28 PROCEDURE — 99999 PR PBB SHADOW E&M-EST. PATIENT-LVL III: CPT | Mod: PBBFAC,,,

## 2024-03-28 PROCEDURE — 86592 SYPHILIS TEST NON-TREP QUAL: CPT

## 2024-03-28 PROCEDURE — 80053 COMPREHEN METABOLIC PANEL: CPT

## 2024-03-28 PROCEDURE — 99213 OFFICE O/P EST LOW 20 MIN: CPT | Mod: TH,S$PBB,,

## 2024-03-28 PROCEDURE — 36415 COLL VENOUS BLD VENIPUNCTURE: CPT

## 2024-03-28 NOTE — PROGRESS NOTES
24 y.o.,  at 37w4d presents today for routine OB appt.  Denies regular UCs, LOF or VB. Reports good FM. Not doing well. Persistent N/V with 10 lb weight loss in one week. Patient strongly desires IOL.  TWG: -29 lbs     PHYSICAL EXAM  GENERAL: No acute distress  HEENT: Normocephalic, atraumatic  NEURO: Alert and oriented x3  PULMONARY: Non-labored respiration; no tachypnea  ABD: Soft, gravid, nontender.    ASSESSMENT AND PLAN  GBS (group B Streptococcus carrier), +RV culture, currently pregnant    Obesity affecting pregnancy in third trimester, unspecified obesity type    Hyperemesis affecting pregnancy, antepartum    Weight loss, unintentional  -     IP OB Labor Induction; Future; Expected date: 2024    Anemia affecting pregnancy in third trimester    History of postpartum depression    Lesion of liver greater than 1 cm in diameter  -     COMPREHENSIVE METABOLIC PANEL; Future; Expected date: 2024  -     IP OB Labor Induction; Future; Expected date: 2024    37 weeks gestation of pregnancy  -     HIV 1/2 Ag/Ab (4th Gen); Future; Expected date: 2024  -     RPR; Future; Expected date: 2024  -     IP OB Labor Induction; Future; Expected date: 2024    Multigravida in third trimester      Delivery consents previously signed  Reviewed GBS POS and need for intrapartum abx  Reviewed that repeat 3rd trimester HIV/RPR need to be done today  Education regarding labor precautions.  Reviewed warning signs, normal FM, and how/when to call.    Strict [facility], pre-E precautions  Does not meet criteria for IOL today per Dr. Harrison, OB staff. Membrane sweep performed per recommendation by Dr. Harrison. IOL scheduled for 39+0 at 4/7 at [facility]. Orders placed.    Follow-up: 1 week, call or present sooner PRN    Graciela Duque CNM/LOWELL

## 2024-03-29 ENCOUNTER — HOSPITAL ENCOUNTER (EMERGENCY)
Facility: OTHER | Age: 24
Discharge: HOME | End: 2024-03-29
Attending: OBSTETRICS & GYNECOLOGY
Payer: MEDICAID

## 2024-03-29 ENCOUNTER — PATIENT MESSAGE (OUTPATIENT)
Dept: OBSTETRICS AND GYNECOLOGY | Facility: CLINIC | Age: 24
End: 2024-03-29
Payer: MEDICAID

## 2024-03-29 ENCOUNTER — TELEPHONE (OUTPATIENT)
Dept: OBSTETRICS AND GYNECOLOGY | Facility: CLINIC | Age: 24
End: 2024-03-29
Payer: MEDICAID

## 2024-03-29 VITALS — DIASTOLIC BLOOD PRESSURE: 67 MMHG | SYSTOLIC BLOOD PRESSURE: 114 MMHG | OXYGEN SATURATION: 97 % | HEART RATE: 93 BPM

## 2024-03-29 DIAGNOSIS — Z3A.37 37 WEEKS GESTATION OF PREGNANCY: ICD-10-CM

## 2024-03-29 DIAGNOSIS — R51.9 ACUTE NONINTRACTABLE HEADACHE, UNSPECIFIED HEADACHE TYPE: Primary | ICD-10-CM

## 2024-03-29 LAB
ABO + RH BLD: NORMAL
ALBUMIN SERPL BCP-MCNC: 2.9 G/DL (ref 3.5–5.2)
ALP SERPL-CCNC: 191 U/L (ref 55–135)
ALT SERPL W/O P-5'-P-CCNC: 31 U/L (ref 10–44)
ANION GAP SERPL CALC-SCNC: 10 MMOL/L (ref 8–16)
AST SERPL-CCNC: 34 U/L (ref 10–40)
BASOPHILS # BLD AUTO: 0.03 K/UL (ref 0–0.2)
BASOPHILS NFR BLD: 0.3 % (ref 0–1.9)
BILIRUB SERPL-MCNC: 0.4 MG/DL (ref 0.1–1)
BILIRUB SERPL-MCNC: NEGATIVE MG/DL
BLD GP AB SCN CELLS X3 SERPL QL: NORMAL
BLOOD URINE, POC: NEGATIVE
BUN SERPL-MCNC: 6 MG/DL (ref 6–20)
CALCIUM SERPL-MCNC: 9 MG/DL (ref 8.7–10.5)
CHLORIDE SERPL-SCNC: 107 MMOL/L (ref 95–110)
CO2 SERPL-SCNC: 19 MMOL/L (ref 23–29)
COLOR, POC UA: YELLOW
CREAT SERPL-MCNC: 0.7 MG/DL (ref 0.5–1.4)
CREAT UR-MCNC: 182.5 MG/DL (ref 15–325)
DIFFERENTIAL METHOD BLD: ABNORMAL
EOSINOPHIL # BLD AUTO: 0.1 K/UL (ref 0–0.5)
EOSINOPHIL NFR BLD: 0.7 % (ref 0–8)
ERYTHROCYTE [DISTWIDTH] IN BLOOD BY AUTOMATED COUNT: 13.1 % (ref 11.5–14.5)
EST. GFR  (NO RACE VARIABLE): >60 ML/MIN/1.73 M^2
GLUCOSE SERPL-MCNC: 88 MG/DL (ref 70–110)
GLUCOSE UR QL STRIP: NORMAL
HCT VFR BLD AUTO: 31.9 % (ref 37–48.5)
HGB BLD-MCNC: 10.9 G/DL (ref 12–16)
HIV 1+2 AB+HIV1 P24 AG SERPL QL IA: NORMAL
IMM GRANULOCYTES # BLD AUTO: 0.08 K/UL (ref 0–0.04)
IMM GRANULOCYTES NFR BLD AUTO: 0.8 % (ref 0–0.5)
KETONES UR QL STRIP: NORMAL
LEUKOCYTE ESTERASE URINE, POC: NEGATIVE
LYMPHOCYTES # BLD AUTO: 1.9 K/UL (ref 1–4.8)
LYMPHOCYTES NFR BLD: 19.2 % (ref 18–48)
MAGNESIUM SERPL-MCNC: 1.5 MG/DL (ref 1.6–2.6)
MCH RBC QN AUTO: 28.7 PG (ref 27–31)
MCHC RBC AUTO-ENTMCNC: 34.2 G/DL (ref 32–36)
MCV RBC AUTO: 84 FL (ref 82–98)
MONOCYTES # BLD AUTO: 0.7 K/UL (ref 0.3–1)
MONOCYTES NFR BLD: 7.2 % (ref 4–15)
NEUTROPHILS # BLD AUTO: 7.1 K/UL (ref 1.8–7.7)
NEUTROPHILS NFR BLD: 71.8 % (ref 38–73)
NITRITE, POC UA: NEGATIVE
NRBC BLD-RTO: 0 /100 WBC
PH, POC UA: 8
PHOSPHATE SERPL-MCNC: 2.6 MG/DL (ref 2.7–4.5)
PLATELET # BLD AUTO: 338 K/UL (ref 150–450)
PMV BLD AUTO: 10.4 FL (ref 9.2–12.9)
POTASSIUM SERPL-SCNC: 3.7 MMOL/L (ref 3.5–5.1)
PROT SERPL-MCNC: 6.9 G/DL (ref 6–8.4)
PROT UR-MCNC: 15 MG/DL (ref 0–15)
PROT/CREAT UR: 0.08 MG/G{CREAT} (ref 0–0.2)
PROTEIN, POC: NORMAL
RBC # BLD AUTO: 3.8 M/UL (ref 4–5.4)
SODIUM SERPL-SCNC: 136 MMOL/L (ref 136–145)
SPECIFIC GRAVITY, POC UA: 1.01
UROBILINOGEN, POC UA: NORMAL
WBC # BLD AUTO: 9.88 K/UL (ref 3.9–12.7)

## 2024-03-29 PROCEDURE — 80053 COMPREHEN METABOLIC PANEL: CPT

## 2024-03-29 PROCEDURE — 99284 EMERGENCY DEPT VISIT MOD MDM: CPT | Mod: 25

## 2024-03-29 PROCEDURE — 85025 COMPLETE CBC W/AUTO DIFF WBC: CPT

## 2024-03-29 PROCEDURE — 84100 ASSAY OF PHOSPHORUS: CPT

## 2024-03-29 PROCEDURE — 99283 EMERGENCY DEPT VISIT LOW MDM: CPT | Mod: 25,,, | Performed by: OBSTETRICS & GYNECOLOGY

## 2024-03-29 PROCEDURE — 83735 ASSAY OF MAGNESIUM: CPT

## 2024-03-29 PROCEDURE — 81002 URINALYSIS NONAUTO W/O SCOPE: CPT

## 2024-03-29 PROCEDURE — 84156 ASSAY OF PROTEIN URINE: CPT

## 2024-03-29 PROCEDURE — 63600175 PHARM REV CODE 636 W HCPCS

## 2024-03-29 PROCEDURE — 96374 THER/PROPH/DIAG INJ IV PUSH: CPT

## 2024-03-29 PROCEDURE — 59025 FETAL NON-STRESS TEST: CPT

## 2024-03-29 PROCEDURE — 96375 TX/PRO/DX INJ NEW DRUG ADDON: CPT

## 2024-03-29 PROCEDURE — 59025 FETAL NON-STRESS TEST: CPT | Mod: 26,,, | Performed by: OBSTETRICS & GYNECOLOGY

## 2024-03-29 PROCEDURE — 96361 HYDRATE IV INFUSION ADD-ON: CPT

## 2024-03-29 PROCEDURE — 86901 BLOOD TYPING SEROLOGIC RH(D): CPT | Performed by: OBSTETRICS & GYNECOLOGY

## 2024-03-29 RX ORDER — DIPHENHYDRAMINE HYDROCHLORIDE 50 MG/ML
25 INJECTION INTRAMUSCULAR; INTRAVENOUS EVERY 6 HOURS PRN
Status: DISCONTINUED | OUTPATIENT
Start: 2024-03-29 | End: 2024-03-29 | Stop reason: HOSPADM

## 2024-03-29 RX ORDER — PROCHLORPERAZINE EDISYLATE 5 MG/ML
10 INJECTION INTRAMUSCULAR; INTRAVENOUS ONCE
Status: COMPLETED | OUTPATIENT
Start: 2024-03-29 | End: 2024-03-29

## 2024-03-29 RX ORDER — METOCLOPRAMIDE HYDROCHLORIDE 5 MG/ML
10 INJECTION INTRAMUSCULAR; INTRAVENOUS ONCE
Status: COMPLETED | OUTPATIENT
Start: 2024-03-29 | End: 2024-03-29

## 2024-03-29 RX ADMIN — METOCLOPRAMIDE 10 MG: 5 INJECTION, SOLUTION INTRAMUSCULAR; INTRAVENOUS at 04:03

## 2024-03-29 RX ADMIN — SODIUM CHLORIDE, SODIUM LACTATE, POTASSIUM CHLORIDE, CALCIUM CHLORIDE AND DEXTROSE MONOHYDRATE 1000 ML: 5; 600; 310; 30; 20 INJECTION, SOLUTION INTRAVENOUS at 04:03

## 2024-03-29 RX ADMIN — DIPHENHYDRAMINE HYDROCHLORIDE 25 MG: 50 INJECTION, SOLUTION INTRAMUSCULAR; INTRAVENOUS at 04:03

## 2024-03-29 RX ADMIN — PROCHLORPERAZINE EDISYLATE 10 MG: 5 INJECTION INTRAMUSCULAR; INTRAVENOUS at 05:03

## 2024-03-29 NOTE — DISCHARGE INSTRUCTIONS
Please return to Labor and Delivery if you experience any leaking of fluid (like your water bag broke), vaginal bleeding (soaking more than 1 pad per hour), decreased fetal movement, and/or having contractions every 2-5 minutes for 2 hours.     Labor and Delivery: [phone]

## 2024-03-29 NOTE — ED PROVIDER NOTES
Encounter Date: 3/29/2024       History     Chief Complaint   Patient presents with    Headache     Annalisa Dietrich is a 24 y.o. female  at 37w5d with Estimated Date of Delivery: 24 who presents to [facility] with complaint of 7-8/10 headache for 3 days. She is unable to take acetaminophen for the pain. Upon presentation, she is actively vomiting. She experiencing hyperemesis gravidarum and has a 30 lb unintentional weight loss this pregnancy. Yesterday she had marginally decreased fetal movement x1 week which has since improved. She endorses visual changes (cloud in right upper visual field starting today) and denies RUQ pain. She denies palmar/solar itching.    Annalisa endorses mild, irregular contractions, active fetal movement, denies vaginal bleeding or loss of fluid.     This pregnancy has been complicated by HEG, unintentional weight loss, transaminitis, anemia, liver nodule, and GBS.  Patient Active Problem List:     History of postpartum depression     Obesity affecting pregnancy in third trimester     Nausea and vomiting during pregnancy     Pregnancy     Migraine headache without aura     Weight loss, unintentional     Anemia affecting pregnancy in third trimester     Lesion of liver greater than 1 cm in diameter     History of transaminitis     Hyperemesis affecting pregnancy, antepartum     GBS (group B Streptococcus carrier), +RV culture, currently pregnant     Multigravida in third trimester               Review of patient's allergies indicates:  No Known Allergies  Past Medical History:   Diagnosis Date    Anemia affecting pregnancy in first trimester 2023    Lesion of liver greater than 1 cm in diameter 2024    Migraine     Postpartum depression 02/10/2020     No past surgical history on file.  Family History   Problem Relation Age of Onset    Cancer Maternal Grandmother     Hypertension Maternal Grandmother     Hypertension Mother     Arrhythmia Neg Hx     Cardiomyopathy Neg Hx      "Congenital heart disease Neg Hx     Heart attacks under age 50 Neg Hx     Pacemaker/defibrilator Neg Hx      Social History     Tobacco Use    Smoking status: Never    Smokeless tobacco: Never   Substance Use Topics    Alcohol use: No    Drug use: No     Review of Systems   Constitutional:  Positive for appetite change, fatigue and unexpected weight change. Negative for chills and fever.   Eyes:  Positive for visual disturbance.        Has "cloud" obstructing vision on right upper visual field which began today   Respiratory:  Negative for shortness of breath.    Cardiovascular:  Negative for chest pain.   Gastrointestinal:  Positive for nausea and vomiting. Negative for abdominal pain.   Genitourinary:  Negative for difficulty urinating, dysuria, vaginal bleeding and vaginal discharge.       Physical Exam     Initial Vitals   BP Pulse Resp Temp SpO2   03/29/24 1630 03/29/24 1645 -- -- 03/29/24 1630   125/81 (!) 116   98 %      MAP       --                Physical Exam    Vitals reviewed.  Constitutional:   Ill-appearing   HENT:   Head: Atraumatic.   Eyes: Conjunctivae are normal.   Neck:   Normal range of motion.  Cardiovascular:            tachycardia   Pulmonary/Chest: No respiratory distress.   Abdominal: Abdomen is soft. She exhibits no distension. There is no abdominal tenderness.   gravid There is no rebound.   Genitourinary:    No vaginal discharge.     Musculoskeletal:         General: Normal range of motion.      Cervical back: Normal range of motion.     Neurological: She is alert and oriented to person, place, and time.   Skin: Skin is warm and dry. There is pallor.   Psychiatric: She has a normal mood and affect. Her behavior is normal. Judgment and thought content normal.     OB LABOR EXAM:             Dilatation: 3.   Station: -4.               ED Course   Obtain Fetal nonstress test (NST)    Date/Time: 3/29/2024 5:41 PM    Performed by: Graciela Duque CNM  Authorized by: Graciela Duque CNM  "   Nonstress Test:     Variability:  6-25 BPM    Decelerations:  None    Accelerations:  15 bpm    Acoustic Stimulator: No      Baseline:  130    Uterine Irritability: No      Contractions:  Not present  Biophysical Profile:     Nonstress Test Interpretation: reactive      Overall Impression:  Reassuring  Post-procedure:     Patient tolerance:  Patient tolerated the procedure well with no immediate complications    Labs Reviewed   CBC W/ AUTO DIFFERENTIAL - Abnormal; Notable for the following components:       Result Value    RBC 3.80 (*)     Hemoglobin 10.9 (*)     Hematocrit 31.9 (*)     Immature Granulocytes 0.8 (*)     Immature Grans (Abs) 0.08 (*)     All other components within normal limits   COMPREHENSIVE METABOLIC PANEL - Abnormal; Notable for the following components:    CO2 19 (*)     Albumin 2.9 (*)     Alkaline Phosphatase 191 (*)     All other components within normal limits   MAGNESIUM - Abnormal; Notable for the following components:    Magnesium 1.5 (*)     All other components within normal limits   PHOSPHORUS - Abnormal; Notable for the following components:    Phosphorus 2.6 (*)     All other components within normal limits   PROTEIN / CREATININE RATIO, URINE    Narrative:     Specimen Source->Urine   POCT URINALYSIS, DIPSTICK OR TABLET REAGENT, AUTOMATED, WITH MICROSCOP   TYPE AND SCREEN LABOR & DELIVERY          Imaging Results    None          Medications   diphenhydrAMINE injection 25 mg (25 mg Intravenous Given 3/29/24 1649)   dextrose 5% lactated ringers bolus 1,000 mL (1,000 mLs Intravenous New Bag 3/29/24 1650)   metoclopramide injection 10 mg (10 mg Intravenous Given 3/29/24 1649)   prochlorperazine injection Soln 10 mg (10 mg Intravenous Given 3/29/24 1742)     Medical Decision Making  CBC/CMP/Mag/Phos essentially normal with mildly low Mag/phos. AST/ALT WNL.  D5LR bolus, Benadryl and reglan IV >> Visual field disturbance subsided after IV fluids, however headache persists at a 7/10.  Compazine 10 @ 1745 >> pain decreased from 8 to 4/10 30 mins after compazine administration  Urine dip showed trace ketones and trace protein. PC ratio TLTC  SVE 3.5/30/-4 @ 1630, was 2 cm in clinic. Unchanged 1 hr later at 1730.  Discussed plan of care with Dr. Rivas - discharge patient with follow-up next week for PARESH. IOL scheduled for  at midnight, plan for pitocin   NST reactive and reassuring    Amount and/or Complexity of Data Reviewed  Labs: ordered.    Risk  Prescription drug management.              Attending Attestation:   Physician Attestation Statement for Resident:  As the supervising MD   Physician Attestation Statement: I have personally seen and examined this patient.   I agree with the above history.  -:   As the supervising MD I agree with the above PE.     As the supervising MD I agree with the above treatment, course, plan, and disposition.   -: I agree with the above edited resident note. Pt seen and examined, chart and labs reviewed.    Briefly, 25 yo  at 37w5d presenting for headache, visual disturbance, N/V. Afebrile, VSS. NST Cat I reactive, Becenti with irritability but no pattern. SVE 3.5/30/-4. D5 bolus given with improvement in visual disturbance. Benadryl and reglan given w/o improvement in headache. Compazine given with improvement. CBC, CMP Mg and Phos unremarkable. Pt given labor and PreE precautions.    All questions answered. Stable for d/c home with outpatient follow up.     Bridgette Rivas MD  OB Hospitalist  3/29/2024     I was personally present during the critical portions of the procedure(s) performed by the resident and was immediately available in the ED to provide services and assistance as needed during the entire procedure.  I have reviewed and agree with the residents interpretation of the following: lab data.  I have reviewed the following: old records at this facility.                                       Clinical Impression:  Final diagnoses:  [R51.9] Acute  nonintractable headache, unspecified headache type (Primary)  [Z3A.37] 37 weeks gestation of pregnancy                 Graciela Duque CNM  03/29/24 1817       Bridgette Rivas MD  03/29/24 1823

## 2024-03-29 NOTE — TELEPHONE ENCOUNTER
Called patient to discuss lab results. Slightly elevated AST (45) with normal ALT and electrolytes. She endorses nausea without vomiting today. However she does endorse a consistently 7-8/10 headache since Tuesday which has not subsided. She is unable to take tylenol due to her transaminitis this pregnancy. Some contractions yesterday and overnight but nothing regular. Active fetal movement, no LOF or vaginal bleeding. She does not have a BP cuff at home. Denies difficulty with urination, edema, RUQ pain, or visual changes. Encouraged to present to [facility] for headache management and BP check after reviewing patient with hospitalist Dr. Rivas. Annalisa verbalized understanding and is in agreement.

## 2024-03-31 ENCOUNTER — ANESTHESIA (OUTPATIENT)
Dept: OBSTETRICS AND GYNECOLOGY | Facility: OTHER | Age: 24
End: 2024-03-31
Payer: MEDICAID

## 2024-03-31 ENCOUNTER — ANESTHESIA EVENT (OUTPATIENT)
Dept: OBSTETRICS AND GYNECOLOGY | Facility: OTHER | Age: 24
End: 2024-03-31
Payer: MEDICAID

## 2024-03-31 ENCOUNTER — HOSPITAL ENCOUNTER (INPATIENT)
Facility: OTHER | Age: 24
LOS: 3 days | Discharge: HOME | End: 2024-04-03
Attending: OBSTETRICS & GYNECOLOGY | Admitting: OBSTETRICS & GYNECOLOGY
Payer: MEDICAID

## 2024-03-31 ENCOUNTER — PATIENT MESSAGE (OUTPATIENT)
Dept: OBSTETRICS AND GYNECOLOGY | Facility: CLINIC | Age: 24
End: 2024-03-31
Payer: MEDICAID

## 2024-03-31 DIAGNOSIS — O36.8130 DECREASED FETAL MOVEMENTS IN THIRD TRIMESTER, SINGLE OR UNSPECIFIED FETUS: ICD-10-CM

## 2024-03-31 DIAGNOSIS — O36.8130 DECREASED FETAL MOVEMENTS IN THIRD TRIMESTER: ICD-10-CM

## 2024-03-31 DIAGNOSIS — O47.9 UTERINE CONTRACTIONS AT GREATER THAN 20 WEEKS OF GESTATION: ICD-10-CM

## 2024-03-31 DIAGNOSIS — Z3A.38 38 WEEKS GESTATION OF PREGNANCY: ICD-10-CM

## 2024-03-31 DIAGNOSIS — O36.8390 NON-REASSURING FETAL CARDIOTOCOGRAPHIC TRACING: ICD-10-CM

## 2024-03-31 DIAGNOSIS — Z30.09 UNWANTED FERTILITY: ICD-10-CM

## 2024-03-31 LAB
ABO + RH BLD: NORMAL
BASOPHILS # BLD AUTO: 0.04 K/UL (ref 0–0.2)
BASOPHILS NFR BLD: 0.4 % (ref 0–1.9)
BLD GP AB SCN CELLS X3 SERPL QL: NORMAL
DIFFERENTIAL METHOD BLD: ABNORMAL
EOSINOPHIL # BLD AUTO: 0.1 K/UL (ref 0–0.5)
EOSINOPHIL NFR BLD: 1.2 % (ref 0–8)
ERYTHROCYTE [DISTWIDTH] IN BLOOD BY AUTOMATED COUNT: 13.1 % (ref 11.5–14.5)
HCT VFR BLD AUTO: 34.1 % (ref 37–48.5)
HGB BLD-MCNC: 11.4 G/DL (ref 12–16)
IMM GRANULOCYTES # BLD AUTO: 0.06 K/UL (ref 0–0.04)
IMM GRANULOCYTES NFR BLD AUTO: 0.6 % (ref 0–0.5)
LYMPHOCYTES # BLD AUTO: 2.2 K/UL (ref 1–4.8)
LYMPHOCYTES NFR BLD: 23.2 % (ref 18–48)
MCH RBC QN AUTO: 27.8 PG (ref 27–31)
MCHC RBC AUTO-ENTMCNC: 33.4 G/DL (ref 32–36)
MCV RBC AUTO: 83 FL (ref 82–98)
MONOCYTES # BLD AUTO: 0.6 K/UL (ref 0.3–1)
MONOCYTES NFR BLD: 6.5 % (ref 4–15)
NEUTROPHILS # BLD AUTO: 6.5 K/UL (ref 1.8–7.7)
NEUTROPHILS NFR BLD: 68.1 % (ref 38–73)
NRBC BLD-RTO: 0 /100 WBC
PLATELET # BLD AUTO: 316 K/UL (ref 150–450)
PMV BLD AUTO: 10.1 FL (ref 9.2–12.9)
RBC # BLD AUTO: 4.1 M/UL (ref 4–5.4)
SPECIMEN OUTDATE: NORMAL
WBC # BLD AUTO: 9.54 K/UL (ref 3.9–12.7)

## 2024-03-31 PROCEDURE — 86901 BLOOD TYPING SEROLOGIC RH(D): CPT

## 2024-03-31 PROCEDURE — 59025 FETAL NON-STRESS TEST: CPT

## 2024-03-31 PROCEDURE — 85025 COMPLETE CBC W/AUTO DIFF WBC: CPT

## 2024-03-31 PROCEDURE — 4A1HXCZ MONITORING OF PRODUCTS OF CONCEPTION, CARDIAC RATE, EXTERNAL APPROACH: ICD-10-PCS | Performed by: OBSTETRICS & GYNECOLOGY

## 2024-03-31 PROCEDURE — 99285 EMERGENCY DEPT VISIT HI MDM: CPT | Mod: 25

## 2024-03-31 PROCEDURE — 99283 EMERGENCY DEPT VISIT LOW MDM: CPT | Mod: 25,,, | Performed by: OBSTETRICS & GYNECOLOGY

## 2024-03-31 PROCEDURE — 59025 FETAL NON-STRESS TEST: CPT | Mod: 26,,, | Performed by: OBSTETRICS & GYNECOLOGY

## 2024-03-31 PROCEDURE — 11000001 HC ACUTE MED/SURG PRIVATE ROOM

## 2024-03-31 PROCEDURE — 76815 OB US LIMITED FETUS(S): CPT | Mod: 26,,, | Performed by: OBSTETRICS & GYNECOLOGY

## 2024-03-31 RX ORDER — SIMETHICONE 80 MG
1 TABLET,CHEWABLE ORAL 4 TIMES DAILY PRN
Status: DISCONTINUED | OUTPATIENT
Start: 2024-04-01 | End: 2024-04-02

## 2024-03-31 RX ORDER — SODIUM CHLORIDE, SODIUM LACTATE, POTASSIUM CHLORIDE, CALCIUM CHLORIDE 600; 310; 30; 20 MG/100ML; MG/100ML; MG/100ML; MG/100ML
INJECTION, SOLUTION INTRAVENOUS CONTINUOUS
Status: DISCONTINUED | OUTPATIENT
Start: 2024-04-01 | End: 2024-04-02

## 2024-03-31 RX ORDER — METHYLERGONOVINE MALEATE 0.2 MG/ML
200 INJECTION INTRAVENOUS ONCE AS NEEDED
Status: DISCONTINUED | OUTPATIENT
Start: 2024-04-01 | End: 2024-04-02

## 2024-03-31 RX ORDER — CALCIUM CARBONATE 200(500)MG
500 TABLET,CHEWABLE ORAL 3 TIMES DAILY PRN
Status: DISCONTINUED | OUTPATIENT
Start: 2024-04-01 | End: 2024-04-02

## 2024-03-31 RX ORDER — OXYTOCIN/RINGER'S LACTATE 30/500 ML
95 PLASTIC BAG, INJECTION (ML) INTRAVENOUS ONCE AS NEEDED
Status: COMPLETED | OUTPATIENT
Start: 2024-04-01 | End: 2024-04-01

## 2024-03-31 RX ORDER — CARBOPROST TROMETHAMINE 250 UG/ML
250 INJECTION, SOLUTION INTRAMUSCULAR
Status: DISCONTINUED | OUTPATIENT
Start: 2024-04-01 | End: 2024-04-02

## 2024-03-31 RX ORDER — ONDANSETRON 8 MG/1
8 TABLET, ORALLY DISINTEGRATING ORAL EVERY 8 HOURS PRN
Status: DISCONTINUED | OUTPATIENT
Start: 2024-04-01 | End: 2024-04-02

## 2024-03-31 RX ORDER — OXYTOCIN/RINGER'S LACTATE 30/500 ML
334 PLASTIC BAG, INJECTION (ML) INTRAVENOUS ONCE
Status: DISCONTINUED | OUTPATIENT
Start: 2024-03-31 | End: 2024-04-02

## 2024-03-31 RX ORDER — TRANEXAMIC ACID 10 MG/ML
1000 INJECTION, SOLUTION INTRAVENOUS EVERY 30 MIN PRN
Status: DISCONTINUED | OUTPATIENT
Start: 2024-03-31 | End: 2024-04-02

## 2024-03-31 RX ORDER — MISOPROSTOL 200 UG/1
800 TABLET ORAL ONCE AS NEEDED
Status: DISCONTINUED | OUTPATIENT
Start: 2024-03-31 | End: 2024-04-02

## 2024-03-31 RX ORDER — OXYTOCIN/RINGER'S LACTATE 30/500 ML
0-30 PLASTIC BAG, INJECTION (ML) INTRAVENOUS CONTINUOUS
Status: DISCONTINUED | OUTPATIENT
Start: 2024-04-01 | End: 2024-04-02

## 2024-03-31 RX ORDER — OXYTOCIN 10 [USP'U]/ML
10 INJECTION, SOLUTION INTRAMUSCULAR; INTRAVENOUS ONCE AS NEEDED
Status: DISCONTINUED | OUTPATIENT
Start: 2024-04-01 | End: 2024-04-02

## 2024-03-31 RX ORDER — DIPHENOXYLATE HYDROCHLORIDE AND ATROPINE SULFATE 2.5; .025 MG/1; MG/1
2 TABLET ORAL EVERY 6 HOURS PRN
Status: DISCONTINUED | OUTPATIENT
Start: 2024-04-01 | End: 2024-04-02

## 2024-03-31 RX ORDER — OXYTOCIN/RINGER'S LACTATE 30/500 ML
95 PLASTIC BAG, INJECTION (ML) INTRAVENOUS ONCE
Status: DISCONTINUED | OUTPATIENT
Start: 2024-04-01 | End: 2024-04-02

## 2024-03-31 RX ORDER — OXYTOCIN/RINGER'S LACTATE 30/500 ML
334 PLASTIC BAG, INJECTION (ML) INTRAVENOUS ONCE AS NEEDED
Status: COMPLETED | OUTPATIENT
Start: 2024-03-31 | End: 2024-04-01

## 2024-03-31 RX ORDER — MISOPROSTOL 200 UG/1
800 TABLET ORAL ONCE AS NEEDED
Status: COMPLETED | OUTPATIENT
Start: 2024-04-01 | End: 2024-04-01

## 2024-03-31 RX ORDER — LIDOCAINE HYDROCHLORIDE 10 MG/ML
10 INJECTION INFILTRATION; PERINEURAL ONCE AS NEEDED
Status: DISCONTINUED | OUTPATIENT
Start: 2024-04-01 | End: 2024-04-02

## 2024-04-01 PROBLEM — O36.8390 FETAL HEART RATE/RHYTHM ABNORMALITY AFFECTING MANAGEMENT OF MOTHER: Status: ACTIVE | Noted: 2024-04-01

## 2024-04-01 PROBLEM — O36.8130 DECREASED FETAL MOVEMENTS IN THIRD TRIMESTER: Status: RESOLVED | Noted: 2024-03-31 | Resolved: 2024-04-01

## 2024-04-01 PROBLEM — O36.8390 FETAL HEART RATE/RHYTHM ABNORMALITY AFFECTING MANAGEMENT OF MOTHER: Status: RESOLVED | Noted: 2024-04-01 | Resolved: 2024-04-01

## 2024-04-01 PROCEDURE — 10907ZC DRAINAGE OF AMNIOTIC FLUID, THERAPEUTIC FROM PRODUCTS OF CONCEPTION, VIA NATURAL OR ARTIFICIAL OPENING: ICD-10-PCS | Performed by: OBSTETRICS & GYNECOLOGY

## 2024-04-01 PROCEDURE — 63600175 PHARM REV CODE 636 W HCPCS

## 2024-04-01 PROCEDURE — 71000033 HC RECOVERY, INTIAL HOUR: Performed by: OBSTETRICS & GYNECOLOGY

## 2024-04-01 PROCEDURE — 25000003 PHARM REV CODE 250: Performed by: STUDENT IN AN ORGANIZED HEALTH CARE EDUCATION/TRAINING PROGRAM

## 2024-04-01 PROCEDURE — 37000009 HC ANESTHESIA EA ADD 15 MINS: Performed by: OBSTETRICS & GYNECOLOGY

## 2024-04-01 PROCEDURE — 36004722: Performed by: OBSTETRICS & GYNECOLOGY

## 2024-04-01 PROCEDURE — 58605 DIVISION OF FALLOPIAN TUBE: CPT | Mod: 53,,, | Performed by: OBSTETRICS & GYNECOLOGY

## 2024-04-01 PROCEDURE — 25000003 PHARM REV CODE 250

## 2024-04-01 PROCEDURE — 72100002 HC LABOR CARE, 1ST 8 HOURS

## 2024-04-01 PROCEDURE — 59409 OBSTETRICAL CARE: CPT | Mod: AT,,,

## 2024-04-01 PROCEDURE — 11000001 HC ACUTE MED/SURG PRIVATE ROOM

## 2024-04-01 PROCEDURE — 71000039 HC RECOVERY, EACH ADD'L HOUR: Performed by: OBSTETRICS & GYNECOLOGY

## 2024-04-01 PROCEDURE — 59409 OBSTETRICAL CARE: CPT | Mod: AA,,, | Performed by: ANESTHESIOLOGY

## 2024-04-01 PROCEDURE — 3E033VJ INTRODUCTION OF OTHER HORMONE INTO PERIPHERAL VEIN, PERCUTANEOUS APPROACH: ICD-10-PCS | Performed by: OBSTETRICS & GYNECOLOGY

## 2024-04-01 PROCEDURE — 27200710 HC EPIDURAL INFUSION PUMP SET: Performed by: ANESTHESIOLOGY

## 2024-04-01 PROCEDURE — 51702 INSERT TEMP BLADDER CATH: CPT

## 2024-04-01 PROCEDURE — 36004723: Performed by: OBSTETRICS & GYNECOLOGY

## 2024-04-01 PROCEDURE — 0UQMXZZ REPAIR VULVA, EXTERNAL APPROACH: ICD-10-PCS | Performed by: OBSTETRICS & GYNECOLOGY

## 2024-04-01 PROCEDURE — 63600175 PHARM REV CODE 636 W HCPCS: Performed by: STUDENT IN AN ORGANIZED HEALTH CARE EDUCATION/TRAINING PROGRAM

## 2024-04-01 PROCEDURE — 72200005 HC VAGINAL DELIVERY LEVEL II

## 2024-04-01 PROCEDURE — 63600175 PHARM REV CODE 636 W HCPCS: Mod: JZ,JG

## 2024-04-01 PROCEDURE — C1751 CATH, INF, PER/CENT/MIDLINE: HCPCS | Performed by: ANESTHESIOLOGY

## 2024-04-01 PROCEDURE — 37000008 HC ANESTHESIA 1ST 15 MINUTES: Performed by: OBSTETRICS & GYNECOLOGY

## 2024-04-01 PROCEDURE — 72100003 HC LABOR CARE, EA. ADDL. 8 HRS

## 2024-04-01 PROCEDURE — 62326 NJX INTERLAMINAR LMBR/SAC: CPT | Performed by: STUDENT IN AN ORGANIZED HEALTH CARE EDUCATION/TRAINING PROGRAM

## 2024-04-01 RX ORDER — DIPHENHYDRAMINE HYDROCHLORIDE 50 MG/ML
25 INJECTION INTRAMUSCULAR; INTRAVENOUS EVERY 4 HOURS PRN
Status: DISCONTINUED | OUTPATIENT
Start: 2024-04-01 | End: 2024-04-03 | Stop reason: HOSPADM

## 2024-04-01 RX ORDER — ONDANSETRON HYDROCHLORIDE 2 MG/ML
INJECTION, SOLUTION INTRAVENOUS
Status: COMPLETED
Start: 2024-04-01 | End: 2024-04-01

## 2024-04-01 RX ORDER — HYDROCODONE BITARTRATE AND ACETAMINOPHEN 5; 325 MG/1; MG/1
1 TABLET ORAL EVERY 4 HOURS PRN
Status: DISCONTINUED | OUTPATIENT
Start: 2024-04-01 | End: 2024-04-02

## 2024-04-01 RX ORDER — FENTANYL CITRATE 50 UG/ML
INJECTION, SOLUTION INTRAMUSCULAR; INTRAVENOUS
Status: COMPLETED
Start: 2024-04-01 | End: 2024-04-01

## 2024-04-01 RX ORDER — DEXMEDETOMIDINE HYDROCHLORIDE 100 UG/ML
INJECTION, SOLUTION INTRAVENOUS
Status: DISCONTINUED | OUTPATIENT
Start: 2024-04-01 | End: 2024-04-01

## 2024-04-01 RX ORDER — HYDROCORTISONE 25 MG/G
CREAM TOPICAL 3 TIMES DAILY PRN
Status: DISCONTINUED | OUTPATIENT
Start: 2024-04-01 | End: 2024-04-03 | Stop reason: HOSPADM

## 2024-04-01 RX ORDER — DEXAMETHASONE SODIUM PHOSPHATE 4 MG/ML
INJECTION, SOLUTION INTRA-ARTICULAR; INTRALESIONAL; INTRAMUSCULAR; INTRAVENOUS; SOFT TISSUE
Status: DISCONTINUED | OUTPATIENT
Start: 2024-04-01 | End: 2024-04-01

## 2024-04-01 RX ORDER — SIMETHICONE 80 MG
1 TABLET,CHEWABLE ORAL EVERY 6 HOURS PRN
Status: DISCONTINUED | OUTPATIENT
Start: 2024-04-01 | End: 2024-04-03 | Stop reason: HOSPADM

## 2024-04-01 RX ORDER — SODIUM CHLORIDE, SODIUM LACTATE, POTASSIUM CHLORIDE, CALCIUM CHLORIDE 600; 310; 30; 20 MG/100ML; MG/100ML; MG/100ML; MG/100ML
INJECTION, SOLUTION INTRAVENOUS CONTINUOUS PRN
Status: DISCONTINUED | OUTPATIENT
Start: 2024-04-01 | End: 2024-04-01

## 2024-04-01 RX ORDER — DOCUSATE SODIUM 100 MG/1
200 CAPSULE, LIQUID FILLED ORAL 2 TIMES DAILY PRN
Status: DISCONTINUED | OUTPATIENT
Start: 2024-04-01 | End: 2024-04-02

## 2024-04-01 RX ORDER — FAMOTIDINE 10 MG/ML
INJECTION INTRAVENOUS
Status: COMPLETED
Start: 2024-04-01 | End: 2024-04-01

## 2024-04-01 RX ORDER — LIDOCAINE HCL/EPINEPHRINE/PF 2%-1:200K
VIAL (ML) INJECTION
Status: DISCONTINUED | OUTPATIENT
Start: 2024-04-01 | End: 2024-04-01

## 2024-04-01 RX ORDER — OXYTOCIN/RINGER'S LACTATE 30/500 ML
95 PLASTIC BAG, INJECTION (ML) INTRAVENOUS ONCE
Status: DISCONTINUED | OUTPATIENT
Start: 2024-04-01 | End: 2024-04-03

## 2024-04-01 RX ORDER — SODIUM CITRATE AND CITRIC ACID MONOHYDRATE 334; 500 MG/5ML; MG/5ML
30 SOLUTION ORAL ONCE
Status: COMPLETED | OUTPATIENT
Start: 2024-04-01 | End: 2024-04-01

## 2024-04-01 RX ORDER — FENTANYL/BUPIVACAINE/NS/PF 2MCG/ML-.1
PLASTIC BAG, INJECTION (ML) INJECTION
Status: DISCONTINUED | OUTPATIENT
Start: 2024-04-01 | End: 2024-04-01

## 2024-04-01 RX ORDER — FAMOTIDINE 20 MG/1
20 TABLET, FILM COATED ORAL
Status: DISCONTINUED | OUTPATIENT
Start: 2024-04-01 | End: 2024-04-03

## 2024-04-01 RX ORDER — ONDANSETRON HYDROCHLORIDE 2 MG/ML
4 INJECTION, SOLUTION INTRAVENOUS EVERY 6 HOURS PRN
Status: DISCONTINUED | OUTPATIENT
Start: 2024-04-01 | End: 2024-04-02

## 2024-04-01 RX ORDER — ACETAMINOPHEN 325 MG/1
650 TABLET ORAL EVERY 6 HOURS SCHEDULED
Status: DISCONTINUED | OUTPATIENT
Start: 2024-04-01 | End: 2024-04-02

## 2024-04-01 RX ORDER — LIDOCAINE HYDROCHLORIDE AND EPINEPHRINE 15; 5 MG/ML; UG/ML
INJECTION, SOLUTION EPIDURAL
Status: DISCONTINUED | OUTPATIENT
Start: 2024-04-01 | End: 2024-04-01

## 2024-04-01 RX ORDER — KETAMINE HYDROCHLORIDE 50 MG/ML
INJECTION, SOLUTION INTRAMUSCULAR; INTRAVENOUS
Status: DISCONTINUED | OUTPATIENT
Start: 2024-04-01 | End: 2024-04-01

## 2024-04-01 RX ORDER — FENTANYL CITRATE 50 UG/ML
INJECTION, SOLUTION INTRAMUSCULAR; INTRAVENOUS
Status: DISCONTINUED | OUTPATIENT
Start: 2024-04-01 | End: 2024-04-01

## 2024-04-01 RX ORDER — DIPHENHYDRAMINE HCL 25 MG
25 CAPSULE ORAL EVERY 4 HOURS PRN
Status: DISCONTINUED | OUTPATIENT
Start: 2024-04-01 | End: 2024-04-03 | Stop reason: HOSPADM

## 2024-04-01 RX ORDER — BUPIVACAINE HYDROCHLORIDE 2.5 MG/ML
INJECTION, SOLUTION EPIDURAL; INFILTRATION; INTRACAUDAL
Status: DISPENSED
Start: 2024-04-01 | End: 2024-04-01

## 2024-04-01 RX ORDER — CHLOROPROCAINE HYDROCHLORIDE 30 MG/ML
INJECTION, SOLUTION EPIDURAL; INFILTRATION; INTRACAUDAL; PERINEURAL
Status: DISCONTINUED | OUTPATIENT
Start: 2024-04-01 | End: 2024-04-01

## 2024-04-01 RX ORDER — MIDAZOLAM HYDROCHLORIDE 5 MG/ML
INJECTION INTRAMUSCULAR; INTRAVENOUS
Status: DISCONTINUED | OUTPATIENT
Start: 2024-04-01 | End: 2024-04-01

## 2024-04-01 RX ORDER — KETOROLAC TROMETHAMINE 30 MG/ML
INJECTION, SOLUTION INTRAMUSCULAR; INTRAVENOUS
Status: DISCONTINUED | OUTPATIENT
Start: 2024-04-01 | End: 2024-04-01

## 2024-04-01 RX ORDER — IBUPROFEN 600 MG/1
600 TABLET ORAL EVERY 6 HOURS
Status: DISCONTINUED | OUTPATIENT
Start: 2024-04-01 | End: 2024-04-03 | Stop reason: HOSPADM

## 2024-04-01 RX ORDER — FENTANYL/BUPIVACAINE/NS/PF 2MCG/ML-.1
PLASTIC BAG, INJECTION (ML) INJECTION
Status: COMPLETED
Start: 2024-04-01 | End: 2024-04-01

## 2024-04-01 RX ORDER — PRENATAL WITH FERROUS FUM AND FOLIC ACID 3080; 920; 120; 400; 22; 1.84; 3; 20; 10; 1; 12; 200; 27; 25; 2 [IU]/1; [IU]/1; MG/1; [IU]/1; MG/1; MG/1; MG/1; MG/1; MG/1; MG/1; UG/1; MG/1; MG/1; MG/1; MG/1
1 TABLET ORAL DAILY
Status: DISCONTINUED | OUTPATIENT
Start: 2024-04-01 | End: 2024-04-03 | Stop reason: HOSPADM

## 2024-04-01 RX ADMIN — BUPIVACAINE HYDROCHLORIDE 10 ML: 2.5 INJECTION, SOLUTION EPIDURAL; INFILTRATION; INTRACAUDAL; PERINEURAL at 11:04

## 2024-04-01 RX ADMIN — LIDOCAINE HYDROCHLORIDE,EPINEPHRINE BITARTRATE 5 ML: 20; .005 INJECTION, SOLUTION EPIDURAL; INFILTRATION; INTRACAUDAL; PERINEURAL at 02:04

## 2024-04-01 RX ADMIN — Medication 10 ML/HR: at 10:04

## 2024-04-01 RX ADMIN — SODIUM CITRATE AND CITRIC ACID MONOHYDRATE 30 ML: 500; 334 SOLUTION ORAL at 02:04

## 2024-04-01 RX ADMIN — KETAMINE HYDROCHLORIDE 25 MG: 50 INJECTION INTRAMUSCULAR; INTRAVENOUS at 03:04

## 2024-04-01 RX ADMIN — DEXAMETHASONE SODIUM PHOSPHATE 4 MG: 4 INJECTION INTRA-ARTICULAR; INTRALESIONAL; INTRAMUSCULAR; INTRAVENOUS; SOFT TISSUE at 03:04

## 2024-04-01 RX ADMIN — ONDANSETRON 4 MG: 2 INJECTION INTRAMUSCULAR; INTRAVENOUS at 12:04

## 2024-04-01 RX ADMIN — Medication 2.5 ML: at 10:04

## 2024-04-01 RX ADMIN — FENTANYL CITRATE 100 MCG: 0.05 INJECTION, SOLUTION INTRAMUSCULAR; INTRAVENOUS at 10:04

## 2024-04-01 RX ADMIN — SODIUM CHLORIDE, POTASSIUM CHLORIDE, SODIUM LACTATE AND CALCIUM CHLORIDE: 600; 310; 30; 20 INJECTION, SOLUTION INTRAVENOUS at 10:04

## 2024-04-01 RX ADMIN — DEXMEDETOMIDINE HYDROCHLORIDE 12 MCG: 100 INJECTION, SOLUTION INTRAVENOUS at 03:04

## 2024-04-01 RX ADMIN — MIDAZOLAM HYDROCHLORIDE 2 MG: 5 INJECTION, SOLUTION INTRAMUSCULAR; INTRAVENOUS at 03:04

## 2024-04-01 RX ADMIN — LIDOCAINE HYDROCHLORIDE,EPINEPHRINE BITARTRATE 5 ML: 20; .005 INJECTION, SOLUTION EPIDURAL; INFILTRATION; INTRACAUDAL; PERINEURAL at 03:04

## 2024-04-01 RX ADMIN — LIDOCAINE HYDROCHLORIDE,EPINEPHRINE BITARTRATE 5 ML: 15; .005 INJECTION, SOLUTION EPIDURAL; INFILTRATION; INTRACAUDAL; PERINEURAL at 10:04

## 2024-04-01 RX ADMIN — Medication 4 MILLI-UNITS/MIN: at 12:04

## 2024-04-01 RX ADMIN — SODIUM CHLORIDE, SODIUM LACTATE, POTASSIUM CHLORIDE, AND CALCIUM CHLORIDE: 600; 310; 30; 20 INJECTION, SOLUTION INTRAVENOUS at 02:04

## 2024-04-01 RX ADMIN — Medication 95 MILLI-UNITS/MIN: at 01:04

## 2024-04-01 RX ADMIN — CHLOROPROCAINE HYDROCHLORIDE 10 ML: 30 INJECTION, SOLUTION EPIDURAL; INFILTRATION; INTRACAUDAL; PERINEURAL at 03:04

## 2024-04-01 RX ADMIN — DEXMEDETOMIDINE HYDROCHLORIDE 8 MCG: 100 INJECTION, SOLUTION INTRAVENOUS at 03:04

## 2024-04-01 RX ADMIN — Medication 334 MILLI-UNITS/MIN: at 12:04

## 2024-04-01 RX ADMIN — SODIUM CHLORIDE, POTASSIUM CHLORIDE, SODIUM LACTATE AND CALCIUM CHLORIDE: 600; 310; 30; 20 INJECTION, SOLUTION INTRAVENOUS at 12:04

## 2024-04-01 RX ADMIN — FAMOTIDINE 20 MG: 10 INJECTION, SOLUTION INTRAVENOUS at 02:04

## 2024-04-01 RX ADMIN — MISOPROSTOL 800 MCG: 200 TABLET ORAL at 12:04

## 2024-04-01 RX ADMIN — DEXTROSE MONOHYDRATE 5 MILLION UNITS: 5 INJECTION INTRAVENOUS at 12:04

## 2024-04-01 RX ADMIN — KETOROLAC TROMETHAMINE 30 MG: 30 INJECTION, SOLUTION INTRAMUSCULAR; INTRAVENOUS at 03:04

## 2024-04-01 RX ADMIN — FENTANYL CITRATE 100 MCG: 0.05 INJECTION, SOLUTION INTRAMUSCULAR; INTRAVENOUS at 02:04

## 2024-04-01 RX ADMIN — Medication 5 ML: at 10:04

## 2024-04-01 RX ADMIN — IBUPROFEN 600 MG: 600 TABLET, FILM COATED ORAL at 11:04

## 2024-04-01 RX ADMIN — DEXTROSE MONOHYDRATE 3 MILLION UNITS: 50 INJECTION, SOLUTION INTRAVENOUS at 04:04

## 2024-04-01 RX ADMIN — DEXTROSE MONOHYDRATE 3 MILLION UNITS: 50 INJECTION, SOLUTION INTRAVENOUS at 08:04

## 2024-04-01 RX ADMIN — ONDANSETRON 4 MG: 2 INJECTION INTRAMUSCULAR; INTRAVENOUS at 03:04

## 2024-04-01 NOTE — HOSPITAL COURSE
3/31/24  2300 - SVE 3/40/-3, patient continues to report lack of fetal movement. Cat 2 tracing. Admit for IOL. T&S, CBC, PCN per protocol, start pitocin  4/1/24  0430 - SVE 4/50/-3, patient feeling like contractions are getting stronger. Continue to titrate pitocin - currently infusing at 8 mu/min.   0815 - SVE 5/60/-2, AROM clear fluid. Patient endorses painful contractions. Pitocin at 16 mU/min    4/2/24 PPD #1, doing well, normal uterine involution. Nausea has resolved. Breastfeeding. For discharge home tomorrow.   4/3/24 PPD2 - doing well today, normal lochia, pain well controlled. Breastfeeding going well. Discharge home today.

## 2024-04-01 NOTE — ASSESSMENT & PLAN NOTE
SVE 4/50/-3  Continue to titrate pitocin per protocol - currently at 8 mu/min  Consider AROM with next check if head well applied  cEFM  Encourage movement   Epidural per request  Update physician team PRN  SVE in 2-4 hrs or PRN

## 2024-04-01 NOTE — ANESTHESIA PREPROCEDURE EVALUATION
[facility]  Obstetric Anesthesia Pre-Procedure Evaluation         Patient Name: Annalisa Dietrich  YOB: 2000  MRN: [id number]    2024    SUBJECTIVE:     Annalisa Dietrich is a 24 y.o. female  at 38w0d who presents to L&D from [facility] for IOL due to decreased fetal movement. Current IUP has been otherwise complicated by hyperemesis gravidarum, unintentional weight loss, and a liver nodule.     Previous pregnancies have been complicated by PROM, postpartum depression, and miscarriage (G2)    She reports previous neuraxial anesthesia. She has had epidurals with previous deliveries.  She denies issues with these neuraxials.  She denies issues with previous general anesthesia.     She denies history of HTN, asthma, bleeding or coagulation disorders, spine abnormalities, or previous back surgeries.      OB History    Para Term  AB Living   4 2 2 0 1 2   SAB IAB Ectopic Multiple Live Births   0 0 0   2      # Outcome Date GA Lbr Giovani/2nd Weight Sex Delivery Anes PTL Lv   4 Current            3 Term 19 39w5d  3.62 kg (7 lb 15.7 oz) M Vag-Spont EPI N ELINOR   2 AB            1 Term     F Vag-Spont   ELINOR       Review of patient's allergies indicates:  No Known Allergies    Patient Active Problem List   Diagnosis    History of postpartum depression    Obesity affecting pregnancy in third trimester    Nausea and vomiting during pregnancy    Pregnancy    Migraine headache without aura    Weight loss, unintentional    Anemia affecting pregnancy in third trimester    Lesion of liver greater than 1 cm in diameter    History of transaminitis    Hyperemesis affecting pregnancy, antepartum    GBS (group B Streptococcus carrier), +RV culture, currently pregnant    Multigravida in third trimester    Decreased fetal movements in third trimester       No past surgical history on file.    Tobacco Use: Low Risk  (3/28/2024)    Patient History     Smoking Tobacco Use: Never     Smokeless  Tobacco Use: Never     Passive Exposure: Not on file     Alcohol Use: Not At Risk (10/27/2023)    AUDIT-C     Frequency of Alcohol Consumption: Never     Average Number of Drinks: Patient does not drink     Frequency of Binge Drinking: Never     Social History     Substance and Sexual Activity   Drug Use No       OBJECTIVE:     Vital Signs:  Pulse:  [82-93]   BP: (127-132)/(66-72)   SpO2:  [95 %-99 %]       Wt Readings from Last 1 Encounters:   03/28/24 1126 103.3 kg (227 lb 11.8 oz)       BP Readings from Last 3 Encounters:   03/31/24 132/66   03/29/24 114/67   03/28/24 122/66       Significant Labs    Heme Profile  Lab Results   Component Value Date    WBC 9.54 03/31/2024    HGB 11.4 (L) 03/31/2024    HCT 34.1 (L) 03/31/2024     03/31/2024       BMP  Lab Results   Component Value Date     03/29/2024    K 3.7 03/29/2024     03/29/2024    CO2 19 (L) 03/29/2024    BUN 6 03/29/2024    CREATININE 0.7 03/29/2024    MG 1.5 (L) 03/29/2024    PHOS 2.6 (L) 03/29/2024       Liver Function Tests  Lab Results   Component Value Date    AST 34 03/29/2024    ALT 31 03/29/2024    ALKPHOS 191 (H) 03/29/2024    BILITOT 0.4 03/29/2024    PROT 6.9 03/29/2024    ALBUMIN 2.9 (L) 03/29/2024       Cardiac Studies    EKG:   Results for orders placed or performed during the hospital encounter of 01/30/24   EKG 12-lead    Collection Time: 01/30/24  3:45 PM   Result Value Ref Range    QRS Duration 90 ms    [facility] QTC Calculation 436 ms    Narrative    Test Reason : R00.0,    Vent. Rate : 098 BPM     Atrial Rate : 098 BPM     P-R Int : 142 ms          QRS Dur : 090 ms      QT Int : 342 ms       P-R-T Axes : 045 057 027 degrees     QTc Int : 436 ms    Normal sinus rhythm  Normal ECG  No previous ECGs available  Confirmed by Rosemarie CASTILLO, [facility] CALEB ([id number]) on 2/29/2024 1:49:00 PM    Referred By: ELIAS   SELF           Confirmed By:Victor Hugo Houston MD           ASSESSMENT/PLAN:       Pre-op Assessment    I have reviewed the Patient  Summary Reports.     I have reviewed the Nursing Notes. I have reviewed the NPO Status.   I have reviewed the Medications.     Review of Systems  Anesthesia Hx:  No problems with previous Anesthesia   Neg history of prior surgery.          Denies Family Hx of Anesthesia complications.    Denies Personal Hx of Anesthesia complications.                    Hematology/Oncology:                  --  Coag Disorders: Denies Bleeding Disorder:                        Cardiovascular:      Denies Hypertension.  Denies Valvular problems/Murmurs.    Denies Dysrhythmias.    Denies CHF.                                 Pulmonary:     Denies Asthma.   Denies Shortness of breath.                  Renal/:   Denies Chronic Renal Disease.                Hepatic/GI:      Liver Disease, (liver lesion)            OB/GYN/PEDS:    Planned Vaginal Delivery Normal without problems on previous vaginal deliveries.               4  , Para 2  , 2 previous vaginal deliveries     Issues with Current Pregnancy  are Anemia        Denies Problems with Previous Pregnancy / Delivery  Neuraxial Anesthesia - Previous History of no problems with epidural             Neurological:    Denies CVA.    Denies Seizures.                                Endocrine:  Denies Diabetes.         Obesity / BMI > 30  Psych:  Denies Psychiatric History.                  Physical Exam  General: Well nourished, Cooperative and Alert    Airway:  Mallampati: I   Mouth Opening: Normal  TM Distance: Normal  Tongue: Normal  Neck ROM: Normal ROM    Dental:  Intact  No loose teeth per patient.       Anesthesia Plan  Type of Anesthesia, risks & benefits discussed:    Anesthesia Type: Epidural, Spinal, CSE, Gen ETT  Intra-op Monitoring Plan: Standard ASA Monitors  Post Op Pain Control Plan: multimodal analgesia and IV/PO Opioids PRN  Informed Consent: Informed consent signed with the Patient and all parties understand the risks and agree with anesthesia plan.  All questions  answered.   ASA Score: 2  Day of Surgery Review of History & Physical: H&P Update referred to the surgeon/provider.    Ready For Surgery From Anesthesia Perspective.     .

## 2024-04-01 NOTE — H&P
[facility] - Labor & Delivery  Obstetrics  History & Physical    Patient Name: Annalisa Dietrich  MRN: [id number]  Admission Date: 3/31/2024  Primary Care Provider: Devyn Duke MD    Subjective:     Principal Problem:Decreased fetal movements in third trimester    History of Present Illness:    Annalisa Dietrich is a 24 y.o. female  at 38w0d with Estimated Date of Delivery: 24 based on 1T US who presents to [facility] accompanied by partner. Expecting a girl!     Reports decreased fetal movement since noon today. She feels like she has not felt the baby move at all since 1600.  Irregular contractions, decreased  fetal movement, No vaginal bleeding , No loss of fluid.      Last ate brownie bite this morning, last drank water at now.      This pregnancy has been complicated by  Patient Active Problem List:     History of postpartum depression     Obesity affecting pregnancy in third trimester     Nausea and vomiting during pregnancy     Pregnancy     Migraine headache without aura     Weight loss, unintentional     Anemia affecting pregnancy in third trimester     Lesion of liver greater than 1 cm in diameter     History of transaminitis     Hyperemesis affecting pregnancy, antepartum     GBS (group B Streptococcus carrier), +RV culture, currently pregnant     Multigravida in third trimester        Presentation:   Cephalic per SVE and BSUS  Estimated Fetal Weight: 8 lbs     Birth Center Risk Assessment: 1-management on labor and Delivery for IOL     CNM management in ABC  CNM management on L&D  Consultation with OB to develop  plan of care  Collaborative CNM/OB management with delivery on L&D                           Obstetric HPI:  Patient reports irregular contractions, decreased  to absent fetal movement, No vaginal bleeding , No loss of fluid     This pregnancy has been complicated by   Patient Active Problem List   Diagnosis    History of postpartum depression    Obesity affecting pregnancy in third trimester     Nausea and vomiting during pregnancy    Pregnancy    Migraine headache without aura    Weight loss, unintentional    Anemia affecting pregnancy in third trimester    Lesion of liver greater than 1 cm in diameter    History of transaminitis    Hyperemesis affecting pregnancy, antepartum    GBS (group B Streptococcus carrier), +RV culture, currently pregnant    Multigravida in third trimester    Decreased fetal movements in third trimester        OB History    Para Term  AB Living   4 2 2 0 1 2   SAB IAB Ectopic Multiple Live Births   0 0 0 0 2      # Outcome Date GA Lbr Giovani/2nd Weight Sex Delivery Anes PTL Lv   4 Current            3 Term 19 39w5d  3.62 kg (7 lb 15.7 oz) M Vag-Spont EPI N ELINOR      Name: CRISTÓBAL ATKINS      Apgar1: 8  Apgar5: 9   2 AB            1 Term     F Vag-Spont   ELINOR     Past Medical History:   Diagnosis Date    Anemia affecting pregnancy in first trimester 2023    Lesion of liver greater than 1 cm in diameter 2024    Migraine     Postpartum depression 02/10/2020     No past surgical history on file.    Facility-Administered Medications Prior to Admission   Medication    ondansetron injection 4 mg     PTA Medications   Medication Sig    albuterol (VENTOLIN HFA) 90 mcg/actuation inhaler Inhale 2 puffs into the lungs every 6 (six) hours as needed for Wheezing. Rescue    aspirin 81 MG Chew Take 1 tablet (81 mg total) by mouth once daily.    benzonatate (TESSALON PERLES) 100 MG capsule Take 2 capsules (200 mg total) by mouth 3 (three) times daily as needed for Cough. (Patient not taking: Reported on 2024)    docusate sodium (COLACE) 100 MG capsule Take 1 capsule (100 mg total) by mouth 2 (two) times daily as needed for Constipation.    esomeprazole (NEXIUM) 40 MG capsule Take 1 capsule (40 mg total) by mouth before breakfast.    ETONOGESTREL (IMPLANON SDRM) by Subdermal route.    famotidine (PEPCID) 40 MG tablet Take 1 tablet (40 mg total) by mouth once  daily.    ferrous sulfate (FEOSOL) 325 mg (65 mg iron) Tab tablet Take 1 tablet (325 mg total) by mouth every other day.    fluticasone (FLONASE) 50 mcg/actuation nasal spray 1 spray (50 mcg total) by Each Nare route once daily. (Patient not taking: Reported on 9/8/2022)    levocetirizine (XYZAL) 5 MG tablet Take 1 tablet (5 mg total) by mouth every evening.    loratadine (CLARITIN) 10 mg tablet Take 1 tablet (10 mg total) by mouth once daily.    metoclopramide HCl (REGLAN) 10 MG tablet Take 1 tablet (10 mg total) by mouth every 6 (six) hours.    ondansetron (ZOFRAN) 4 MG tablet Take 1 tablet (4 mg total) by mouth every 6 (six) hours as needed for Nausea.    prenatal vit no.126-iron-folic (CLASSIC PRENATAL) 28 mg iron- 800 mcg Tab Take 1 tablet by mouth once daily.    promethazine (PHENERGAN) 12.5 MG Tab Take 1 tablet (12.5 mg total) by mouth every 4 (four) hours as needed (Nausea and vomiting).    pyridoxine, vitamin B6, (B-6) 25 MG Tab Take 1 tablet (25 mg total) by mouth every evening.       Review of patient's allergies indicates:  No Known Allergies     Family History       Problem Relation (Age of Onset)    Cancer Maternal Grandmother    Hypertension Maternal Grandmother, Mother          Tobacco Use    Smoking status: Never    Smokeless tobacco: Never   Substance and Sexual Activity    Alcohol use: No    Drug use: No    Sexual activity: Yes     Partners: Male     Birth control/protection: Implant     Review of Systems   Constitutional:  Positive for activity change (breathing through contractions), appetite change, fatigue and unexpected weight change (weight loss). Negative for chills and fever.   HENT: Negative.     Eyes: Negative.    Respiratory: Negative.     Cardiovascular: Negative.    Gastrointestinal:  Positive for abdominal pain (contractions) and nausea. Negative for vomiting.   Endocrine: Negative.    Genitourinary:  Negative for vaginal bleeding and vaginal discharge.   Musculoskeletal: Negative.     Integumentary:  Negative.   Neurological: Negative.    Hematological: Negative.    Psychiatric/Behavioral: Negative.     Breast: negative.       Objective:     Vital Signs (Most Recent):  Pulse: 82 (24)  BP: 132/66 (24)  SpO2: 95 % (24) Vital Signs (24h Range):  Pulse:  [82-93] 82  SpO2:  [95 %-99 %] 95 %  BP: (127-132)/(66-72) 132/66        There is no height or weight on file to calculate BMI.    FHT: 110 Cat 2 (overall reassuring) mod variability, +accels, no decels, low baseline  TOCO:  irregular     Physical Exam:   Constitutional: She is oriented to person, place, and time. She appears well-developed and well-nourished.    HENT:   Head: Normocephalic and atraumatic.    Eyes: Conjunctivae are normal.     Cardiovascular:  Normal rate and regular rhythm.             Pulmonary/Chest: Effort normal. No respiratory distress.        Abdominal: Soft. She exhibits distension (gravid). There is no abdominal tenderness.     Genitourinary: No vaginal discharge in the vagina.           Musculoskeletal: Normal range of motion.       Neurological: She is alert and oriented to person, place, and time.    Skin: Skin is warm and dry.    Psychiatric: She has a normal mood and affect. Her behavior is normal. Judgment and thought content normal.        Cervix:  Dilation:  3  Effacement:  40%  Station: -3  Presentation: Vertex     Significant Labs:  Lab Results   Component Value Date    GROUPTRH A POS 2024    HEPBSAG Non-reactive 10/19/2023    STREPBCULT (A) 2024     STREPTOCOCCUS AGALACTIAE (GROUP B)  In case of Penicillin allergy, call lab for further testing.  Beta-hemolytic streptococci are routinely susceptible to   penicillins,cephalosporins and carbapenems.         I have personallly reviewed all pertinent lab results from the last 24 hours.  Assessment/Plan:     24 y.o. female  at 38w0d for:    * Decreased fetal movements in third trimester  Admit to L&D  Start IV, T&S,  CBC  cEFM  High dose pitocin per protocol  Updated physician team on POC  SVE in 4 hrs or PRN    GBS (group B Streptococcus carrier), +RV culture, currently pregnant  PCN per protocol    Anemia affecting pregnancy in third trimester  Admit H/H 11.4/34.1    History of postpartum depression  2 week mood check        Norma Morgan CNM  Obstetrics  [facility] - Labor & Delivery

## 2024-04-01 NOTE — PROGRESS NOTES
[facility] - Labor & Delivery  Obstetrics  Labor Progress Note    Patient Name: Annalisa Dietrich  MRN: [id number]  Admission Date: 3/31/2024  Hospital Length of Stay: 1 days  Attending Physician: Ami Coley MD  Primary Care Provider: Devyn Duke MD    Subjective:     Principal Problem:Decreased fetal movements in third trimester    Hospital Course:  3/31/24  2300 - /-3, patient continues to report lack of fetal movement. Cat 2 tracing. Admit for IOL. T&S, CBC, PCN per protocol, start pitocin  24  0430 - /-3, patient feeling like contractions are getting stronger. Continue to titrate pitocin - currently infusing at 8 mu/min.   0815 - SVE 60/-2, AROM clear fluid. Patient endorses painful contractions. Pitocin at 16 mU/min    Interval History:  Annalisa is a 24 y.o.  at 38w1d. She is doing well.     Objective:     Vital Signs (Most Recent):  Temp: 97.9 °F (36.6 °C) (24)  Pulse: 79 (24)  Resp: 16 (24)  BP: 120/67 (24)  SpO2: 98 % (24) Vital Signs (24h Range):  Temp:  [97.9 °F (36.6 °C)-98.3 °F (36.8 °C)] 97.9 °F (36.6 °C)  Pulse:  [] 79  Resp:  [16] 16  SpO2:  [95 %-100 %] 98 %  BP: ()/(47-88) 120/67        There is no height or weight on file to calculate BMI.    FHT: 120 Cat 1 (reassuring)  TOCO:  Q 3-4 minutes    Cervical Exam:  Dilation:  5  Effacement:  60  Station: -2  Presentation: Vertex     Significant Labs:  Lab Results   Component Value Date    GROUPTRH A POS 2024    HEPBSAG Non-reactive 10/19/2023    STREPBCULT (A) 2024     STREPTOCOCCUS AGALACTIAE (GROUP B)  In case of Penicillin allergy, call lab for further testing.  Beta-hemolytic streptococci are routinely susceptible to   penicillins,cephalosporins and carbapenems.         I have personallly reviewed all pertinent lab results from the last 24 hours.  Recent Lab Results         24  2310        Baso # 0.04       Basophil % 0.4        Differential Method Automated       Eos # 0.1       Eos % 1.2       Gran # (ANC) 6.5       Gran % 68.1       Group & Rh A POS       Hematocrit 34.1       Hemoglobin 11.4       Immature Grans (Abs) 0.06  Comment: Mild elevation in immature granulocytes is non specific and   can be seen in a variety of conditions including stress response,   acute inflammation, trauma and pregnancy. Correlation with other   laboratory and clinical findings is essential.         Immature Granulocytes 0.6       INDIRECT JOHN NEG       Lymph # 2.2       Lymph % 23.2       MCH 27.8       MCHC 33.4       MCV 83       Mono # 0.6       Mono % 6.5       MPV 10.1       nRBC 0       Platelet Count 316       RBC 4.10       RDW 13.1       Specimen Outdate 2024 23:59       WBC 9.54               Physical Exam:   Constitutional: She appears well-developed and well-nourished.    HENT:   Head: Normocephalic.    Eyes: Conjunctivae are normal.     Cardiovascular:  Normal rate.             Pulmonary/Chest: Effort normal. No respiratory distress.        Abdominal: There is no abdominal tenderness.   Moderate ctx     Genitourinary:    Vagina normal.             Musculoskeletal: Normal range of motion.       Neurological: She has normal reflexes.    Skin: Skin is warm and dry.    Psychiatric: She has a normal mood and affect. Her behavior is normal. Judgment and thought content normal.       Review of Systems   Eyes:  Negative for visual disturbance.   Respiratory:  Negative for shortness of breath.    Cardiovascular:  Negative for chest pain.   Gastrointestinal:  Positive for abdominal pain.        Contractions   Genitourinary:  Negative for vaginal bleeding and vaginal discharge.     Assessment/Plan:     24 y.o. female  at 38w1d for:    * Decreased fetal movements in third trimester  SVE /-3  Continue to titrate pitocin per protocol - currently at 8 mu/min  Consider AROM with next check if head well applied  cEFM  Encourage movement    Epidural per request  Update physician team PRN  SVE in 2-4 hrs or PRN    Fetal heart rate/rhythm abnormality affecting management of mother  IOL, pitocin & AROM    GBS (group B Streptococcus carrier), +RV culture, currently pregnant  PCN per protocol    Anemia affecting pregnancy in third trimester  Admit H/H 11.4/34.1    History of postpartum depression  2 week mood check      Re-examine in 2-3 hours or PRN as indicated    Graciela Duque CNM  Obstetrics  [facility] - Labor & Delivery

## 2024-04-01 NOTE — PROGRESS NOTES
[facility] - Labor & Delivery  Obstetrics  Labor Progress Note    Patient Name: Annalisa Dietrich  MRN: [id number]  Admission Date: 3/31/2024  Hospital Length of Stay: 1 days  Attending Physician: Ami Coley MD  Primary Care Provider: Devyn Duke MD    Subjective:     Principal Problem:Decreased fetal movements in third trimester    Hospital Course:  3/31/24  2300 - SVE 3/40/-3, patient continues to report lack of fetal movement. Cat 2 tracing. Admit for IOL. T&S, CBC, PCN per protocol, start pitocin  24  0430 - SVE 50/-3, patient feeling like contractions are getting stronger. Continue to titrate pitocin - currently infusing at 8 mu/min.     Interval History:  Annalisa is a 24 y.o.  at 38w1d. She is doing well, feeling like contractions are getting stronger. Partner and mother at bedside and supportive. Consents to SVE.    Objective:     Vital Signs (Most Recent):  Temp: 98.3 °F (36.8 °C) (24 0249)  Pulse: 96 (24 0427)  BP: 120/66 (24 0427)  SpO2: 99 % (24 0426) Vital Signs (24h Range):  Temp:  [98 °F (36.7 °C)-98.3 °F (36.8 °C)] 98.3 °F (36.8 °C)  Pulse:  [] 96  SpO2:  [95 %-99 %] 99 %  BP: ()/(47-88) 120/66        There is no height or weight on file to calculate BMI.    FHT: Cat 2 (overall reassuring) mod variability, +accels, no decels, low baseline (105-110)  TOCO:  Q 2-4 minutes    Cervical Exam:  Dilation:  4  Effacement:  50%  Station: -3  Presentation: Vertex     Significant Labs:  Lab Results   Component Value Date    GROUPTRH A POS 2024    HEPBSAG Non-reactive 10/19/2023    STREPBCULT (A) 2024     STREPTOCOCCUS AGALACTIAE (GROUP B)  In case of Penicillin allergy, call lab for further testing.  Beta-hemolytic streptococci are routinely susceptible to   penicillins,cephalosporins and carbapenems.         I have personallly reviewed all pertinent lab results from the last 24 hours.      Assessment/Plan:     24 y.o. female  at 38w1d  for:    * Decreased fetal movements in third trimester  SVE 4/50/-3  Continue to titrate pitocin per protocol - currently at 8 mu/min  Consider AROM with next check if head well applied  cEFM  Encourage movement   Epidural per request  Update physician team PRN  SVE in 2-4 hrs or PRN    GBS (group B Streptococcus carrier), +RV culture, currently pregnant  PCN per protocol    Anemia affecting pregnancy in third trimester  Admit H/H 11.4/34.1    History of postpartum depression  2 week mood check          Norma Morgan, MARK  Obstetrics  [facility] - Labor & Delivery

## 2024-04-01 NOTE — HPI
Annalisa Dietrich is a 24 y.o. female  at 38w0d with Estimated Date of Delivery: 24 based on 1T US who presents to [facility] accompanied by partner. Expecting a girl!     Reports decreased fetal movement since noon today. She feels like she has not felt the baby move at all since 1600.  Irregular contractions, decreased  fetal movement, No vaginal bleeding , No loss of fluid.      Last ate brownie bite this morning, last drank water at now.      This pregnancy has been complicated by  Patient Active Problem List:     History of postpartum depression     Obesity affecting pregnancy in third trimester     Nausea and vomiting during pregnancy     Pregnancy     Migraine headache without aura     Weight loss, unintentional     Anemia affecting pregnancy in third trimester     Lesion of liver greater than 1 cm in diameter     History of transaminitis     Hyperemesis affecting pregnancy, antepartum     GBS (group B Streptococcus carrier), +RV culture, currently pregnant     Multigravida in third trimester        Presentation:   Cephalic per SVE and BSUS  Estimated Fetal Weight: 8 lbs     Birth Center Risk Assessment: 1-management on labor and Delivery for IOL     CNM management in ABC  CNM management on L&D  Consultation with OB to develop  plan of care  Collaborative CNM/OB management with delivery on L&D

## 2024-04-01 NOTE — PLAN OF CARE
Problem: Adult Inpatient Plan of Care  Goal: Plan of Care Review  Outcome: Ongoing, Progressing  Goal: Patient-Specific Goal (Individualized)  Outcome: Ongoing, Progressing  Goal: Absence of Hospital-Acquired Illness or Injury  Outcome: Ongoing, Progressing  Goal: Optimal Comfort and Wellbeing  Outcome: Ongoing, Progressing  Goal: Readiness for Transition of Care  Outcome: Ongoing, Progressing     Problem: Infection  Goal: Absence of Infection Signs and Symptoms  Outcome: Ongoing, Progressing     Problem: Pain Acute  Goal: Acceptable Pain Control and Functional Ability  Outcome: Ongoing, Progressing     Problem: Breastfeeding  Goal: Effective Breastfeeding  Outcome: Ongoing, Progressing     Problem: Fall Injury Risk  Goal: Absence of Fall and Fall-Related Injury  Outcome: Ongoing, Progressing     Problem: Fatigue  Goal: Improved Activity Tolerance  Outcome: Ongoing, Progressing     Problem: Circumcision Care ([place])  Goal: Optimal Circumcision Site Healing  Outcome: Ongoing, Progressing     Problem: Hypoglycemia ()  Goal: Glucose Stability  Outcome: Ongoing, Progressing     Problem: Infection ([place])  Goal: Absence of Infection Signs and Symptoms  Outcome: Ongoing, Progressing     Problem: Oral Nutrition ([place])  Goal: Effective Oral Intake  Outcome: Ongoing, Progressing     Problem: Infant-Parent Attachment ()  Goal: Demonstration of Attachment Behaviors  Outcome: Ongoing, Progressing     Problem: Pain ([place])  Goal: Acceptable Level of Comfort and Activity  Outcome: Ongoing, Progressing     Problem: Respiratory Compromise ()  Goal: Effective Oxygenation and Ventilation  Outcome: Ongoing, Progressing     Problem: Skin Injury ([place])  Goal: Skin Health and Integrity  Outcome: Ongoing, Progressing     Problem: Temperature Instability ()  Goal: Temperature Stability  Outcome: Ongoing, Progressing     Problem: Adjustment to Role Transition (Postpartum Vaginal Delivery)  Goal:  Successful Maternal Role Transition  Outcome: Ongoing, Progressing     Problem: Bleeding (Postpartum Vaginal Delivery)  Goal: Hemostasis  Outcome: Ongoing, Progressing     Problem: Infection (Postpartum Vaginal Delivery)  Goal: Absence of Infection Signs/Symptoms  Outcome: Ongoing, Progressing     Problem: Pain (Postpartum Vaginal Delivery)  Goal: Acceptable Pain Control  Outcome: Ongoing, Progressing     Problem: Urinary Retention (Postpartum Vaginal Delivery)  Goal: Effective Urinary Elimination  Outcome: Ongoing, Progressing

## 2024-04-01 NOTE — SUBJECTIVE & OBJECTIVE
Interval History:  Annalisa is a 24 y.o.  at 38w1d. She is doing well.     Objective:     Vital Signs (Most Recent):  Temp: 97.9 °F (36.6 °C) (24)  Pulse: 79 (24)  Resp: 16 (24)  BP: 120/67 (24)  SpO2: 98 % (24) Vital Signs (24h Range):  Temp:  [97.9 °F (36.6 °C)-98.3 °F (36.8 °C)] 97.9 °F (36.6 °C)  Pulse:  [] 79  Resp:  [16] 16  SpO2:  [95 %-100 %] 98 %  BP: ()/(47-88) 120/67        There is no height or weight on file to calculate BMI.    FHT: 120 Cat 1 (reassuring)  TOCO:  Q 3-4 minutes    Cervical Exam:  Dilation:  5  Effacement:  60  Station: -2  Presentation: Vertex     Significant Labs:  Lab Results   Component Value Date    GROUPTRH A POS 2024    HEPBSAG Non-reactive 10/19/2023    STREPBCULT (A) 2024     STREPTOCOCCUS AGALACTIAE (GROUP B)  In case of Penicillin allergy, call lab for further testing.  Beta-hemolytic streptococci are routinely susceptible to   penicillins,cephalosporins and carbapenems.         I have personallly reviewed all pertinent lab results from the last 24 hours.  Recent Lab Results         24  2310        Baso # 0.04       Basophil % 0.4       Differential Method Automated       Eos # 0.1       Eos % 1.2       Gran # (ANC) 6.5       Gran % 68.1       Group & Rh A POS       Hematocrit 34.1       Hemoglobin 11.4       Immature Grans (Abs) 0.06  Comment: Mild elevation in immature granulocytes is non specific and   can be seen in a variety of conditions including stress response,   acute inflammation, trauma and pregnancy. Correlation with other   laboratory and clinical findings is essential.         Immature Granulocytes 0.6       INDIRECT JOHN NEG       Lymph # 2.2       Lymph % 23.2       MCH 27.8       MCHC 33.4       MCV 83       Mono # 0.6       Mono % 6.5       MPV 10.1       nRBC 0       Platelet Count 316       RBC 4.10       RDW 13.1       Specimen Outdate 2024 23:59       WBC  9.54               Physical Exam:   Constitutional: She appears well-developed and well-nourished.    HENT:   Head: Normocephalic.    Eyes: Conjunctivae are normal.     Cardiovascular:  Normal rate.             Pulmonary/Chest: Effort normal. No respiratory distress.        Abdominal: There is no abdominal tenderness.   Moderate ctx     Genitourinary:    Vagina normal.             Musculoskeletal: Normal range of motion.       Neurological: She has normal reflexes.    Skin: Skin is warm and dry.    Psychiatric: She has a normal mood and affect. Her behavior is normal. Judgment and thought content normal.       Review of Systems   Eyes:  Negative for visual disturbance.   Respiratory:  Negative for shortness of breath.    Cardiovascular:  Negative for chest pain.   Gastrointestinal:  Positive for abdominal pain.        Contractions   Genitourinary:  Negative for vaginal bleeding and vaginal discharge.

## 2024-04-01 NOTE — L&D DELIVERY NOTE
[facility] - Labor & Delivery  Vaginal Delivery   Operative Note    SUMMARY   To bedside for routine cervical exam, and patient found to be 8/90/-1. Placed on left lateral with peanut ball and she endorsed feeling intense rectal pressure with the following contraction. SVE 10/100/+3; nursing staff notified and pushing commenced with one contraction of excellent maternal expulsive effort.  Normal spontaneous vaginal delivery of live infant, was placed on mothers abdomen for skin to skin and bulb suctioning performed.  Infant delivered position MOI over intact perineum.  Nuchal cord: No.    Spontaneous delivery of placenta and IV pitocin given noting intermittent uterine atony with bleeding. 800mcg LA misoprostol administered. QBL: 250  Bilateral periurethral abrasions noted, both abrasions repaired with a single interrupted vicryl suture on a 3-0 needle .  Patient tolerated delivery well. Sponge needle and lap counted correctly x2.    Indications:  (spontaneous vaginal delivery)  Pregnancy complicated by:   Patient Active Problem List   Diagnosis    History of postpartum depression    Obesity affecting pregnancy in third trimester    Nausea and vomiting during pregnancy    Pregnancy    Migraine headache without aura    Weight loss, unintentional    Anemia affecting pregnancy in third trimester    Lesion of liver greater than 1 cm in diameter    History of transaminitis    Hyperemesis affecting pregnancy, antepartum    GBS (group B Streptococcus carrier), +RV culture, currently pregnant    Multigravida in third trimester     (spontaneous vaginal delivery)     Admitting GA: 38w1d    Delivery Information for Andre Dietrich    Birth information:  YOB: 2024   Time of birth: 12:24 PM   Sex: female   Head Delivery Date/Time: 2024 12:24 PM   Delivery type:    Gestational Age: 38w1d        Delivery Providers    Delivering clinician: Graciela Duque CNM   Provider Role    Chadd Jasso, MONAE     Kaylyn,  MONAE Flynn Jamie, ST Word, Mary Claire, RN               Measurements    Weight:   Length:          Apgars    Living status: Living  Apgar Component Scores:  1 min.:  5 min.:  10 min.:  15 min.:  20 min.:    Skin color:  0  1       Heart rate:  2  2       Reflex irritability:  2  2       Muscle tone:  2  2       Respiratory effort:  2  2       Total:  8  9       Apgars assigned by: CALEB PAK RN         Operative Delivery    Forceps attempted?: No  Vacuum extractor attempted?: No         Shoulder Dystocia    Shoulder dystocia present?: No           Presentation    Presentation: Vertex  Position: Right Occiput Anterior           Interventions/Resuscitation    Method: Bulb Suctioning, Tactile Stimulation       Cord    Vessels: 3 vessels  Complications: None  Delayed Cord Clamping?: Yes  Cord Clamped Date/Time: 2024 12:26 PM  Cord Blood Disposition: Sent with Baby  Gases Sent?: No  Stem Cell Collection (by MD): No       Placenta    Placenta delivery date/time: 2024 1230  Placenta removal: Spontaneous  Placenta appearance: Intact  Placenta disposition: Discarded           Labor Events:       labor: No     Labor Onset Date/Time: 2024 00:35     Dilation Complete Date/Time: 2024 12:18     Start Pushing Date/Time: 2024 12:23       Start Pushing Date/Time: 2024 12:23     Rupture Date/Time: 24  0817         Rupture type: ARM (Artificial Rupture)         Fluid Amount:       Fluid Color: Clear               steroids: None     Antibiotics given for GBS: Yes     Induction: oxytocin     Indications for induction:  Fetal Heart Rate or Rhythm Abnormality     Augmentation: amniotomy     Indications for augmentation: Ineffective Contraction Pattern     Labor complications: None     Additional complications:          Cervical ripening:                     Delivery:      Episiotomy: None     Indication for Episiotomy:       Perineal Lacerations: None Repaired:       Periurethral Laceration: bilateral Repaired: Yes   Labial Laceration:   Repaired:     Sulcus Laceration:   Repaired:     Vaginal Laceration:   Repaired:     Cervical Laceration:   Repaired:     Repair suture:       Repair # of packets: 1     Last Value - EBL - Nursing (mL):       Sum - EBL - Nursing (mL): 0     Last Value - EBL - Anesthesia (mL):      Calculated QBL (mL):       Running total QBL (mL):       Vaginal Sweep Performed: Yes     Surgicount Correct: Yes     Vaginal Packing: No Quantity:       Other providers:       Anesthesia    Method: Epidural, Spinal          Details (if applicable):  Trial of Labor      Categorization:      Priority:     Indications for :     Incision Type:       Additional  information:  Forceps:    Vacuum:    Breech:    Observed anomalies    Other (Comments):

## 2024-04-01 NOTE — PLAN OF CARE
Problem: Adult Inpatient Plan of Care  Goal: Plan of Care Review  Outcome: Ongoing, Progressing  Goal: Patient-Specific Goal (Individualized)  Outcome: Ongoing, Progressing  Goal: Absence of Hospital-Acquired Illness or Injury  Outcome: Ongoing, Progressing  Goal: Optimal Comfort and Wellbeing  Outcome: Ongoing, Progressing  Goal: Readiness for Transition of Care  Outcome: Ongoing, Progressing     Problem: Bleeding (Labor)  Goal: Hemostasis  Outcome: Ongoing, Progressing     Problem: Change in Fetal Wellbeing (Labor)  Goal: Stable Fetal Wellbeing  Outcome: Ongoing, Progressing     Problem: Delayed Labor Progression (Labor)  Goal: Effective Progression to Delivery  Outcome: Ongoing, Progressing     Problem: Infection (Labor)  Goal: Absence of Infection Signs and Symptoms  Outcome: Ongoing, Progressing     Problem: Labor Pain (Labor)  Goal: Acceptable Pain Control  Outcome: Ongoing, Progressing     Problem: Uterine Tachysystole (Labor)  Goal: Normal Uterine Contraction Pattern  Outcome: Ongoing, Progressing     Problem: Hyperemesis Gravidarum  Goal: Nausea and Vomiting Relief  Outcome: Ongoing, Progressing

## 2024-04-01 NOTE — SUBJECTIVE & OBJECTIVE
Obstetric HPI:  Patient reports irregular contractions, decreased  to absent fetal movement, No vaginal bleeding , No loss of fluid     This pregnancy has been complicated by   Patient Active Problem List   Diagnosis    History of postpartum depression    Obesity affecting pregnancy in third trimester    Nausea and vomiting during pregnancy    Pregnancy    Migraine headache without aura    Weight loss, unintentional    Anemia affecting pregnancy in third trimester    Lesion of liver greater than 1 cm in diameter    History of transaminitis    Hyperemesis affecting pregnancy, antepartum    GBS (group B Streptococcus carrier), +RV culture, currently pregnant    Multigravida in third trimester    Decreased fetal movements in third trimester        OB History    Para Term  AB Living   4 2 2 0 1 2   SAB IAB Ectopic Multiple Live Births   0 0 0 0 2      # Outcome Date GA Lbr Giovani/2nd Weight Sex Delivery Anes PTL Lv   4 Current            3 Term 19 39w5d  3.62 kg (7 lb 15.7 oz) M Vag-Spont EPI N ELINOR      Name: CRISTÓBAL ATKINS      Apgar1: 8  Apgar5: 9   2 AB            1 Term     F Vag-Spont   ELINOR     Past Medical History:   Diagnosis Date    Anemia affecting pregnancy in first trimester 2023    Lesion of liver greater than 1 cm in diameter 2024    Migraine     Postpartum depression 02/10/2020     No past surgical history on file.    Facility-Administered Medications Prior to Admission   Medication    ondansetron injection 4 mg     PTA Medications   Medication Sig    albuterol (VENTOLIN HFA) 90 mcg/actuation inhaler Inhale 2 puffs into the lungs every 6 (six) hours as needed for Wheezing. Rescue    aspirin 81 MG Chew Take 1 tablet (81 mg total) by mouth once daily.    benzonatate (TESSALON PERLES) 100 MG capsule Take 2 capsules (200 mg total) by mouth 3 (three) times daily as needed for Cough. (Patient not taking: Reported on 2024)    docusate sodium (COLACE) 100 MG capsule Take 1  capsule (100 mg total) by mouth 2 (two) times daily as needed for Constipation.    esomeprazole (NEXIUM) 40 MG capsule Take 1 capsule (40 mg total) by mouth before breakfast.    ETONOGESTREL (IMPLANON SDRM) by Subdermal route.    famotidine (PEPCID) 40 MG tablet Take 1 tablet (40 mg total) by mouth once daily.    ferrous sulfate (FEOSOL) 325 mg (65 mg iron) Tab tablet Take 1 tablet (325 mg total) by mouth every other day.    fluticasone (FLONASE) 50 mcg/actuation nasal spray 1 spray (50 mcg total) by Each Nare route once daily. (Patient not taking: Reported on 9/8/2022)    levocetirizine (XYZAL) 5 MG tablet Take 1 tablet (5 mg total) by mouth every evening.    loratadine (CLARITIN) 10 mg tablet Take 1 tablet (10 mg total) by mouth once daily.    metoclopramide HCl (REGLAN) 10 MG tablet Take 1 tablet (10 mg total) by mouth every 6 (six) hours.    ondansetron (ZOFRAN) 4 MG tablet Take 1 tablet (4 mg total) by mouth every 6 (six) hours as needed for Nausea.    prenatal vit no.126-iron-folic (CLASSIC PRENATAL) 28 mg iron- 800 mcg Tab Take 1 tablet by mouth once daily.    promethazine (PHENERGAN) 12.5 MG Tab Take 1 tablet (12.5 mg total) by mouth every 4 (four) hours as needed (Nausea and vomiting).    pyridoxine, vitamin B6, (B-6) 25 MG Tab Take 1 tablet (25 mg total) by mouth every evening.       Review of patient's allergies indicates:  No Known Allergies     Family History       Problem Relation (Age of Onset)    Cancer Maternal Grandmother    Hypertension Maternal Grandmother, Mother          Tobacco Use    Smoking status: Never    Smokeless tobacco: Never   Substance and Sexual Activity    Alcohol use: No    Drug use: No    Sexual activity: Yes     Partners: Male     Birth control/protection: Implant     Review of Systems   Constitutional:  Positive for activity change (breathing through contractions), appetite change, fatigue and unexpected weight change (weight loss). Negative for chills and fever.   HENT:  Negative.     Eyes: Negative.    Respiratory: Negative.     Cardiovascular: Negative.    Gastrointestinal:  Positive for abdominal pain (contractions) and nausea. Negative for vomiting.   Endocrine: Negative.    Genitourinary:  Negative for vaginal bleeding and vaginal discharge.   Musculoskeletal: Negative.    Integumentary:  Negative.   Neurological: Negative.    Hematological: Negative.    Psychiatric/Behavioral: Negative.     Breast: negative.       Objective:     Vital Signs (Most Recent):  Pulse: 82 (03/31/24 2245)  BP: 132/66 (03/31/24 2245)  SpO2: 95 % (03/31/24 2245) Vital Signs (24h Range):  Pulse:  [82-93] 82  SpO2:  [95 %-99 %] 95 %  BP: (127-132)/(66-72) 132/66        There is no height or weight on file to calculate BMI.    FHT: 110 Cat 2 (overall reassuring) mod variability, +accels, no decels, low baseline  TOCO:  irregular     Physical Exam:   Constitutional: She is oriented to person, place, and time. She appears well-developed and well-nourished.    HENT:   Head: Normocephalic and atraumatic.    Eyes: Conjunctivae are normal.     Cardiovascular:  Normal rate and regular rhythm.             Pulmonary/Chest: Effort normal. No respiratory distress.        Abdominal: Soft. She exhibits distension (gravid). There is no abdominal tenderness.     Genitourinary: No vaginal discharge in the vagina.           Musculoskeletal: Normal range of motion.       Neurological: She is alert and oriented to person, place, and time.    Skin: Skin is warm and dry.    Psychiatric: She has a normal mood and affect. Her behavior is normal. Judgment and thought content normal.        Cervix:  Dilation:  3  Effacement:  40%  Station: -3  Presentation: Vertex     Significant Labs:  Lab Results   Component Value Date    GROUPTRH A POS 03/29/2024    HEPBSAG Non-reactive 10/19/2023    STREPBCULT (A) 03/22/2024     STREPTOCOCCUS AGALACTIAE (GROUP B)  In case of Penicillin allergy, call lab for further testing.  Beta-hemolytic  streptococci are routinely susceptible to   penicillins,cephalosporins and carbapenems.         I have personallly reviewed all pertinent lab results from the last 24 hours.

## 2024-04-01 NOTE — PLAN OF CARE
24 1010   OB SCREEN   Assessment Type Discharge Planning Brief Assessment   Source of Information health record   Received Prenatal Care Yes   Any indications/suspicions for None   Is this a teen pregnancy No   Is the baby in NICU No   Indication for adoption/Safe Haven No   Indication for DME/post-acute needs No   HIV (+) No   Any congenital  disorders No   Fetal demise/ death No     Patient has been screened for Social Work discharge planning needs. Based on documentation in medical record, no discharge planning needs are anticipated at this time. Should any discharge planning needs arise, please consult .

## 2024-04-01 NOTE — PROGRESS NOTES
LABOR NOTE    S:  Pt breathing through contractions 30 mins after receiving epidural.  Family at bedside and supportive.     O: /60   Pulse 79   Temp 97.9 °F (36.6 °C) (Oral)   Resp 16   SpO2 99%   Breastfeeding No     GENERAL: Calm and appropriate affect  NEURO: Alert, oriented, normal speech  ABDOMEN: Nontender, Fundus palpates soft between UC's.  FHT: Baseline 130, moderate variability, positive accels, rare variable decels. Cat 2, reassuring.  CTX: q 2-3 minutes  SVE: /-2      ASSESSMENT:   24 y.o.  IUP at 38w1d, FHT reassuring/ Cat 2    Patient Active Problem List   Diagnosis    History of postpartum depression    Obesity affecting pregnancy in third trimester    Nausea and vomiting during pregnancy    Pregnancy    Migraine headache without aura    Weight loss, unintentional    Anemia affecting pregnancy in third trimester    Lesion of liver greater than 1 cm in diameter    History of transaminitis    Hyperemesis affecting pregnancy, antepartum    GBS (group B Streptococcus carrier), +RV culture, currently pregnant    Multigravida in third trimester    Decreased fetal movements in third trimester    Fetal heart rate/rhythm abnormality affecting management of mother         PLAN:  Continue close Maternal/Fetal monitoring  Pitocin Augmentation per protocol - currently infusing at 20mu  Recheck 2 hours or PRN  Epidural infusing, notify anesthesia if not uncontrolled in 15 min; patient encouraged to use PCA  Peanut ball for patient positioning and fetal descent

## 2024-04-01 NOTE — SUBJECTIVE & OBJECTIVE
Interval History:  Annalisa is a 24 y.o.  at 38w1d. She is doing well, feeling like contractions are getting stronger. Partner and mother at bedside and supportive. Consents to SVE.    Objective:     Vital Signs (Most Recent):  Temp: 98.3 °F (36.8 °C) (24 0249)  Pulse: 96 (24 0427)  BP: 120/66 (24 042)  SpO2: 99 % (24 042) Vital Signs (24h Range):  Temp:  [98 °F (36.7 °C)-98.3 °F (36.8 °C)] 98.3 °F (36.8 °C)  Pulse:  [] 96  SpO2:  [95 %-99 %] 99 %  BP: ()/(47-88) 120/66        There is no height or weight on file to calculate BMI.    FHT: Cat 2 (overall reassuring) mod variability, +accels, no decels, low baseline (105-110)  TOCO:  Q 2-4 minutes    Cervical Exam:  Dilation:  4  Effacement:  50%  Station: -3  Presentation: Vertex     Significant Labs:  Lab Results   Component Value Date    GROUPTRH A POS 2024    HEPBSAG Non-reactive 10/19/2023    STREPBCULT (A) 2024     STREPTOCOCCUS AGALACTIAE (GROUP B)  In case of Penicillin allergy, call lab for further testing.  Beta-hemolytic streptococci are routinely susceptible to   penicillins,cephalosporins and carbapenems.         I have personallly reviewed all pertinent lab results from the last 24 hours.

## 2024-04-01 NOTE — ANESTHESIA PROCEDURE NOTES
CSE    Patient location during procedure: OB  Start time: 4/1/2024 10:01 AM  Timeout: 4/1/2024 10:00 AM  End time: 4/1/2024 10:15 AM    Reason for block: labor analgesia requested by patient and obstetrician    Staffing  Authorizing Provider: Eduardo Gutierrez III, MD  Performing Provider: Ger Ho MD    Staffing  Performed by: Ger Ho MD  Authorized by: Eduardo Gutierrez III, MD    Preanesthetic Checklist  Completed: patient identified, IV checked, site marked, risks and benefits discussed, surgical consent, monitors and equipment checked, pre-op evaluation and timeout performed  CSE  Patient position: sitting  Prep: ChloraPrep  Patient monitoring: heart rate, frequent blood pressure checks and continuous pulse ox  Approach: midline  Spinal Needle  Needle type: Octavio   Needle gauge: 25 G  Needle length: 3.5 in  Epidural Needle  Injection technique: KAYLEE air  Needle type: Tuohy   Needle gauge: 17 G  Needle length: 3.5 in  Needle insertion depth: 7 cm  Location: L3-4  Needle localization: anatomical landmarks   Catheter  Catheter type: [employer]  Catheter size: 19 G  Catheter at skin depth: 12 cm  Test dose: lidocaine 1.5% with Epi 1-to-200,000  Test dose: 5 mL  Additional Documentation: incremental injection, negative aspiration for CSF, negative aspiration for heme and no paresthesia on injection  Additional Notes  Administered 2.5 mL of local anesthetic from fent/bupi bag

## 2024-04-01 NOTE — TRANSFER OF CARE
Anesthesia Transfer of Care Note    Patient: Annalisa Dietrich    Procedure(s) Performed: Procedure(s) (LRB):  LIGATION, FALLOPIAN TUBE, POSTPARTUM (Bilateral)    Patient location: Labor and Delivery    Anesthesia Type: MAC and epidural    Transport from OR: Transported from OR on room air with adequate spontaneous ventilation    Post pain: adequate analgesia    Post assessment: no apparent anesthetic complications and tolerated procedure well    Post vital signs: stable    Level of consciousness: awake and alert    Nausea/Vomiting: no nausea/vomiting    Complications: none    Transfer of care protocol was followed      Last vitals: Visit Vitals  BP (!) 111/57   Pulse 75   Temp 36.8 °C (98.3 °F) (Oral)   Resp 16   SpO2 98%   Breastfeeding Unknown

## 2024-04-01 NOTE — OP NOTE
Operative Note    Date of Procedure: 04/01/2024    Pre-Operative Diagnosis:   1.Desires permanent sterilization,   Post-Partum Day # 0    Post-Operative Diagnosis: Same    Procedure: Mini laparotomy    Surgeon:  Gisselle George MD    Assistant:  Hermilo Peterson MD - PGY4  Kyleigh Escamilla MD - PGY1    Anesthesia: Epidural    EBL: <5 cc    Specimen: None    Complications: None    Findings: Inability to safely visualize fallopian tubes due to overlying bowel and depth of abdominal cavity. Procedure aborted. Minimal blood loss.    Procedure in Detail:  After verifying consent, the patient was taken to the OR where she was placed in the dorsal supine position after epidural anesthesia was found to be adequate.    The abdomen was then prepped and draped in the normal, sterile, fashion and two allis clamps were placed lateral to the umbilicus and lateral traction was applied. A 5 cm skin incision was made using the scalpel superior to the umbilicus and was carried down to the underlying fascia. Allis clamps and [employer] retractors were used to provide exposure. The peritoneum was then identified and entered bluntly.    The uterine fundus and cornua were felt on palpation, however noted to be deep in the pelvis. Tagged laps were used to sweep bowel out of operative field, however still unable to visualize fallopian tubes. Despite multiple attempts the improve visual field, the decision was made to abort the procedure. The patient was counseled intra-operatively and will be scheduled for outpatient interval tubal ligation.     The fascia was then closed with 0 vicryl in a running fashion. Subcutaneous tissue was closed with 2-0 vicryl. Skin was then closed with 4-0 monocryl.     Sponge, lap, and needle counts were correct x 2. The patient tolerated the procedure well and was transferred over to the recovery area in stable condition.      Hermilo Peterson M.D.  OB/GYN PGY- 4    I agree with operative note. I was  scrubbed and immediately available during entire surgical procedure performed by the resident(s).    Postpartum bilateral tubal ligation WAS NOT PERFORMED due to inadequate visualization.

## 2024-04-01 NOTE — PROGRESS NOTES
Patient desires PP BTL. Order for NPO placed; patient has been on clear liquid diet. Charge and upper level OB resident physician notified. Epidural remains infusing.

## 2024-04-01 NOTE — ED PROVIDER NOTES
Encounter Date: 3/31/2024       History     Chief Complaint   Patient presents with    Contractions    Decreased Fetal Movement     History of Present Illness:   Annalisa Dietrich is a 24 y.o. female  at 38w0d with Estimated Date of Delivery: 24 based on 1T US who presents to [facility] accompanied by partner. Expecting a girl!    Reports decreased fetal movement since noon today. She feels like she has not felt the baby move at all since 1600.  Irregular contractions, decreased  fetal movement, No vaginal bleeding , No loss of fluid.     Last ate brownie bite this morning, last drank water at now.     This pregnancy has been complicated by  Patient Active Problem List:     History of postpartum depression     Obesity affecting pregnancy in third trimester     Nausea and vomiting during pregnancy     Pregnancy     Migraine headache without aura     Weight loss, unintentional     Anemia affecting pregnancy in third trimester     Lesion of liver greater than 1 cm in diameter     History of transaminitis     Hyperemesis affecting pregnancy, antepartum     GBS (group B Streptococcus carrier), +RV culture, currently pregnant     Multigravida in third trimester       Presentation: Cephalic per SVE and BSUS  Estimated Fetal Weight: 8 lbs    Birth Center Risk Assessment: 1-management on labor and Delivery for IOL    CNM management in ABC  CNM management on L&D  Consultation with OB to develop  plan of care  Collaborative CNM/OB management with delivery on L&D              Review of patient's allergies indicates:  No Known Allergies  Past Medical History:   Diagnosis Date    Anemia affecting pregnancy in first trimester 2023    Lesion of liver greater than 1 cm in diameter 2024    Migraine     Postpartum depression 02/10/2020     No past surgical history on file.  Family History   Problem Relation Age of Onset    Cancer Maternal Grandmother     Hypertension Maternal Grandmother     Hypertension Mother      Arrhythmia Neg Hx     Cardiomyopathy Neg Hx     Congenital heart disease Neg Hx     Heart attacks under age 50 Neg Hx     Pacemaker/defibrilator Neg Hx      Social History     Tobacco Use    Smoking status: Never    Smokeless tobacco: Never   Substance Use Topics    Alcohol use: No    Drug use: No     Review of Systems   Constitutional:  Negative for fever.   HENT: Negative.  Negative for sore throat.    Eyes: Negative.    Respiratory: Negative.  Negative for shortness of breath.    Cardiovascular:  Negative for chest pain.   Gastrointestinal:  Positive for abdominal distention (gravid), abdominal pain (contractions) and nausea. Negative for vomiting.   Endocrine: Negative.    Genitourinary:  Negative for dysuria.   Musculoskeletal:  Negative for back pain.   Skin:  Negative for rash.   Allergic/Immunologic: Negative.    Neurological:  Negative for weakness.   Hematological:  Does not bruise/bleed easily.       Physical Exam     Initial Vitals   BP Pulse Resp Temp SpO2   -- -- -- -- --      MAP       --         Physical Exam    Vitals reviewed.  Constitutional: She appears well-developed and well-nourished. No distress.   HENT:   Head: Normocephalic and atraumatic.   Eyes: Conjunctivae are normal.   Neck: Neck supple.   Normal range of motion.  Cardiovascular:  Normal rate.           Pulmonary/Chest: No respiratory distress.   Abdominal: She exhibits distension (gravid).   Genitourinary:    No vaginal discharge.     Musculoskeletal:         General: Normal range of motion.      Cervical back: Normal range of motion and neck supple.     Neurological: She is alert and oriented to person, place, and time. She has normal strength and normal reflexes.   Skin: Skin is warm and dry.   Psychiatric: She has a normal mood and affect. Her behavior is normal. Judgment and thought content normal.     OB LABOR EXAM:             Dilatation: 3.   Station: -3.   Effacement: 40%.             ED Course   Obtain Fetal nonstress test  (NST)    Date/Time: 3/31/2024 10:59 PM    Performed by: Norma Morgan CNM  Authorized by: Bridgette Rivas MD    Nonstress Test:     Variability:  6-25 BPM    Decelerations:  None    Accelerations:  15 bpm    Acoustic Stimulator: No      Baseline:  105    Uterine Irritability: No      Contractions:  Irregular  Biophysical Profile:     Nonstress Test Interpretation: reactive      Overall Impression:  Reassuring  Post-procedure:     Patient tolerance:  Patient tolerated the procedure well with no immediate complications     Cat II for abnormal baseline    Labs Reviewed   POCT URINALYSIS, DIPSTICK OR TABLET REAGENT, AUTOMATED, WITH MICROSCOP          Imaging Results    None          Medications - No data to display  Medical Decision Making  SVE 3/40/-3  Cat 2 strip due to low baseline  Patient reports that she is still not feeling fetal movement  Discussed with Dr. Rivas  IOL due to decreased fetal movement    Amount and/or Complexity of Data Reviewed  Labs: ordered.              Attending Attestation:   Physician Attestation Statement for Resident:  As the supervising MD   Physician Attestation Statement: I have personally seen and examined this patient.   I agree with the above history.  -:   As the supervising MD I agree with the above PE.     As the supervising MD I agree with the above treatment, course, plan, and disposition.   -: I agree with the above edited resident note. Pt seen and examined, chart and labs reviewed.    Briefly, 25 yo  at 38w0d presenting for decreased fetal movement and r/o labor. Afebrile, VSS. SVE unchanged from recent visit, 3.5/-3. NST with baseline 105 but otherwise reactive and reassuring, cat II for low baseline. [place] with irreg ctx. BSS shows cephalic SIUP. Pt continues to have subjectively decreased fetal movement in [facility]. Recommended IOL for cat II tracing and decreased fetal movement, pt is amenable.     All questions answered. Admit to L&D for further mgmt.      Bridgette Rivas MD  OB Hospitalist  4/1/2024     I was personally present during the critical portions of the procedure(s) performed by the resident and was immediately available in the ED to provide services and assistance as needed during the entire procedure.  I have reviewed and agree with the residents interpretation of the following: lab data.  I have reviewed the following: old records at this facility.                                       Clinical Impression:  Final diagnoses:  [O47.9] Uterine contractions at greater than 20 weeks of gestation (Primary)  [Z3A.38] 38 weeks gestation of pregnancy                 Norma Morgan CNM  03/31/24 0810       Bridgette Rivas MD  04/01/24 0514

## 2024-04-02 LAB
BASOPHILS # BLD AUTO: 0.04 K/UL (ref 0–0.2)
BASOPHILS NFR BLD: 0.3 % (ref 0–1.9)
DIFFERENTIAL METHOD BLD: ABNORMAL
EOSINOPHIL # BLD AUTO: 0.1 K/UL (ref 0–0.5)
EOSINOPHIL NFR BLD: 0.7 % (ref 0–8)
ERYTHROCYTE [DISTWIDTH] IN BLOOD BY AUTOMATED COUNT: 13.2 % (ref 11.5–14.5)
HCT VFR BLD AUTO: 27.8 % (ref 37–48.5)
HGB BLD-MCNC: 9.3 G/DL (ref 12–16)
IMM GRANULOCYTES # BLD AUTO: 0.07 K/UL (ref 0–0.04)
IMM GRANULOCYTES NFR BLD AUTO: 0.6 % (ref 0–0.5)
LYMPHOCYTES # BLD AUTO: 2.4 K/UL (ref 1–4.8)
LYMPHOCYTES NFR BLD: 18.6 % (ref 18–48)
MCH RBC QN AUTO: 28.4 PG (ref 27–31)
MCHC RBC AUTO-ENTMCNC: 33.5 G/DL (ref 32–36)
MCV RBC AUTO: 85 FL (ref 82–98)
MONOCYTES # BLD AUTO: 1 K/UL (ref 0.3–1)
MONOCYTES NFR BLD: 7.8 % (ref 4–15)
NEUTROPHILS # BLD AUTO: 9.1 K/UL (ref 1.8–7.7)
NEUTROPHILS NFR BLD: 72 % (ref 38–73)
NRBC BLD-RTO: 0 /100 WBC
PLATELET # BLD AUTO: 291 K/UL (ref 150–450)
PMV BLD AUTO: 10.2 FL (ref 9.2–12.9)
RBC # BLD AUTO: 3.27 M/UL (ref 4–5.4)
WBC # BLD AUTO: 12.68 K/UL (ref 3.9–12.7)

## 2024-04-02 PROCEDURE — 25000003 PHARM REV CODE 250

## 2024-04-02 PROCEDURE — 25000003 PHARM REV CODE 250: Performed by: GENERAL PRACTICE

## 2024-04-02 PROCEDURE — 11000001 HC ACUTE MED/SURG PRIVATE ROOM

## 2024-04-02 PROCEDURE — 85025 COMPLETE CBC W/AUTO DIFF WBC: CPT

## 2024-04-02 PROCEDURE — 99232 SBSQ HOSP IP/OBS MODERATE 35: CPT | Mod: ,,, | Performed by: ADVANCED PRACTICE MIDWIFE

## 2024-04-02 PROCEDURE — 36415 COLL VENOUS BLD VENIPUNCTURE: CPT

## 2024-04-02 RX ORDER — OXYTOCIN 10 [USP'U]/ML
10 INJECTION, SOLUTION INTRAMUSCULAR; INTRAVENOUS ONCE AS NEEDED
Status: DISCONTINUED | OUTPATIENT
Start: 2024-04-02 | End: 2024-04-03 | Stop reason: HOSPADM

## 2024-04-02 RX ORDER — LANOLIN ALCOHOL/MO/W.PET/CERES
1 CREAM (GRAM) TOPICAL DAILY
Status: DISCONTINUED | OUTPATIENT
Start: 2024-04-02 | End: 2024-04-03 | Stop reason: HOSPADM

## 2024-04-02 RX ORDER — CARBOPROST TROMETHAMINE 250 UG/ML
250 INJECTION, SOLUTION INTRAMUSCULAR
Status: DISCONTINUED | OUTPATIENT
Start: 2024-04-02 | End: 2024-04-03 | Stop reason: HOSPADM

## 2024-04-02 RX ORDER — ONDANSETRON 8 MG/1
8 TABLET, ORALLY DISINTEGRATING ORAL EVERY 8 HOURS PRN
Status: DISCONTINUED | OUTPATIENT
Start: 2024-04-02 | End: 2024-04-03 | Stop reason: HOSPADM

## 2024-04-02 RX ORDER — MISOPROSTOL 200 UG/1
800 TABLET ORAL ONCE AS NEEDED
Status: DISCONTINUED | OUTPATIENT
Start: 2024-04-02 | End: 2024-04-03 | Stop reason: HOSPADM

## 2024-04-02 RX ORDER — DOCUSATE SODIUM 100 MG/1
200 CAPSULE, LIQUID FILLED ORAL 2 TIMES DAILY
Status: DISCONTINUED | OUTPATIENT
Start: 2024-04-02 | End: 2024-04-03 | Stop reason: HOSPADM

## 2024-04-02 RX ORDER — OXYTOCIN/RINGER'S LACTATE 30/500 ML
95 PLASTIC BAG, INJECTION (ML) INTRAVENOUS ONCE AS NEEDED
Status: DISCONTINUED | OUTPATIENT
Start: 2024-04-02 | End: 2024-04-03 | Stop reason: HOSPADM

## 2024-04-02 RX ORDER — TRANEXAMIC ACID 10 MG/ML
1000 INJECTION, SOLUTION INTRAVENOUS EVERY 30 MIN PRN
Status: DISCONTINUED | OUTPATIENT
Start: 2024-04-02 | End: 2024-04-03 | Stop reason: HOSPADM

## 2024-04-02 RX ORDER — METHYLERGONOVINE MALEATE 0.2 MG/ML
200 INJECTION INTRAVENOUS ONCE AS NEEDED
Status: DISCONTINUED | OUTPATIENT
Start: 2024-04-02 | End: 2024-04-03 | Stop reason: HOSPADM

## 2024-04-02 RX ORDER — SODIUM CHLORIDE 0.9 % (FLUSH) 0.9 %
10 SYRINGE (ML) INJECTION
Status: DISCONTINUED | OUTPATIENT
Start: 2024-04-02 | End: 2024-04-03 | Stop reason: HOSPADM

## 2024-04-02 RX ORDER — OXYTOCIN/RINGER'S LACTATE 30/500 ML
334 PLASTIC BAG, INJECTION (ML) INTRAVENOUS ONCE AS NEEDED
Status: DISCONTINUED | OUTPATIENT
Start: 2024-04-02 | End: 2024-04-03 | Stop reason: HOSPADM

## 2024-04-02 RX ORDER — DIPHENOXYLATE HYDROCHLORIDE AND ATROPINE SULFATE 2.5; .025 MG/1; MG/1
2 TABLET ORAL EVERY 6 HOURS PRN
Status: DISCONTINUED | OUTPATIENT
Start: 2024-04-02 | End: 2024-04-03 | Stop reason: HOSPADM

## 2024-04-02 RX ADMIN — FERROUS SULFATE TAB 325 MG (65 MG ELEMENTAL FE) 1 EACH: 325 (65 FE) TAB at 09:04

## 2024-04-02 RX ADMIN — DOCUSATE SODIUM 200 MG: 100 CAPSULE, LIQUID FILLED ORAL at 08:04

## 2024-04-02 RX ADMIN — DOCUSATE SODIUM 200 MG: 100 CAPSULE, LIQUID FILLED ORAL at 09:04

## 2024-04-02 RX ADMIN — PRENATAL VIT W/ FE FUMARATE-FA TAB 27-0.8 MG 1 TABLET: 27-0.8 TAB at 09:04

## 2024-04-02 NOTE — PROGRESS NOTES
[facility] ([place])  Adult Nutrition  Progress Note    SUMMARY       Recommendations    1. Encourage po intake   2. Monitor labs   3. Collaboration with medical providers    Goals: Patient to consume >75% of estiamted needs prior to RD follow up.  Nutrition Goal Status: progressing towards goal  Communication of RD Recs: other (comment) (poc)    Assessment and Plan    Nutrition Problem  Increased nutritional needs    Related to (etiology):   Pregnancy with delivery  Breastfeeding     Signs and Symptoms (as evidenced by):   higher nutrient demands    Interventions (treatment strategy):  Collaboration with medical providers    Nutrition Diagnosis Status:   Improving    Malnutrition Assessment         Micronutrient Evaluation Summary: no deficiencies                             Reason for Assessment    Reason For Assessment: identified at risk by screening criteria    Diagnosis:  ()  Patient Active Problem List   Diagnosis    History of postpartum depression    Obesity affecting pregnancy in third trimester    Nausea and vomiting during pregnancy    Pregnancy    Migraine headache without aura    Weight loss, unintentional    Anemia affecting pregnancy in third trimester    Lesion of liver greater than 1 cm in diameter    History of transaminitis    Hyperemesis affecting pregnancy, antepartum    GBS (group B Streptococcus carrier), +RV culture, currently pregnant    Multigravida in third trimester     (spontaneous vaginal delivery)       Relevant Medical History:   Past Medical History:   Diagnosis Date    Anemia affecting pregnancy in first trimester 2023    Lesion of liver greater than 1 cm in diameter 2024    Migraine     Postpartum depression 02/10/2020       Interdisciplinary Rounds: did not attend (RD remote)    General Information Comments:   2024: Patient is on a regular diet with fair intake.  Previous nausea but has resolved at this time.  Labs reviewed.  MICHAEL.  LBM: 2024.   "Patient with possible discharge home soon.  Patient admitted with spontaneous vaginal delivery.  Patient does not meet criteria for positive NFPE.  Patient is breastfeeding post delivery.  RD to continue to monitor and follow.    Nutrition Discharge Planning: Patient to continue on a healthy oral diet post discharge    Nutrition Risk Screen    Nutrition Risk Screen: no indicators present  Nutrition Related Social Determinants of Health: SDOH: None Identified    Nutrition/Diet History    Spiritual, Cultural Beliefs, Latter-day Practices, Values that Affect Care: no  Food Allergies: NKFA  Factors Affecting Nutritional Intake: None identified at this time    Anthropometrics    Temp: 98.2 °F (36.8 °C)  Height Method: Stated  Height: 5' 9" (175.3 cm)  Height (inches): 69 in  Weight Method: Standard Scale  Weight: 103.3 kg (227 lb 11.8 oz)  Weight (lb): 227.74 lb  Ideal Body Weight (IBW), Female: 145 lb  % Ideal Body Weight, Female (lb): 157.06 %  BMI (Calculated): 33.6  BMI Grade: 30 - 34.9- obesity - grade I       Lab/Procedures/Meds    Pertinent Labs Reviewed: reviewed  BMP  Lab Results   Component Value Date     03/29/2024    K 3.7 03/29/2024     03/29/2024    CO2 19 (L) 03/29/2024    BUN 6 03/29/2024    CREATININE 0.7 03/29/2024    CALCIUM 9.0 03/29/2024    ANIONGAP 10 03/29/2024    EGFRNORACEVR >60 03/29/2024     Lab Results   Component Value Date    HGBA1C 4.9 10/19/2023     Lab Results   Component Value Date    CALCIUM 9.0 03/29/2024    PHOS 2.6 (L) 03/29/2024       Pertinent Medications Reviewed: reviewed  Scheduled Meds:   docusate sodium  200 mg Oral BID    ferrous sulfate  1 tablet Oral Daily    ibuprofen  600 mg Oral Q6H    oxytocin in lactated ringers  95 maria c-units/min Intravenous Once    prenatal vitamin  1 tablet Oral Daily     Continuous Infusions:   benzocaine-lanolin       PRN Meds:.benzocaine-lanolin, carboprost, diphenhydrAMINE, diphenhydrAMINE, diphenoxylate-atropine 2.5-0.025 mg, " famotidine, hydrocortisone, lanolin, methylergonovine, miSOPROStoL, miSOPROStoL, ondansetron, oxytocin in lactated ringers, oxytocin in lactated ringers, oxytocin, simethicone, sodium chloride 0.9%, tranexamic acid (CYKLOKAPRON) infusion    Physical Findings/Assessment         Estimated/Assessed Needs    Weight Used For Calorie Calculations: 103.3 kg (227 lb 11.8 oz)  Energy Calorie Requirements (kcal): 20kcals/kg (2066kcals/day +500kcals)  Energy Need Method: Kcal/kg  Protein Requirements: 1.1g/kg (114g/day)  Weight Used For Protein Calculations: 103.3 kg (227 lb 11.8 oz)  Fluid Requirements (mL): 1ml/kcal  Estimated Fluid Requirement Method: RDA Method  RDA Method (mL): 20  CHO Requirement: 250      Nutrition Prescription Ordered    Current Diet Order: Regular    Evaluation of Received Nutrient/Fluid Intake    % Kcal Needs: >50%  % Protein Needs: >50%  I/O: +1329mL since admit  Energy Calories Required: meeting needs  Protein Required: meeting needs  Fluid Required: meeting needs  Comments: LBM: 4/1/2024  Tolerance: tolerating  % Intake of Estimated Energy Needs: 50 - 75 %  % Meal Intake: 50 - 75 %    Nutrition Risk    Level of Risk/Frequency of Follow-up: low (follow up: 1x per week)     Monitor and Evaluation    Food and Nutrient Intake: energy intake, food and beverage intake  Food and Nutrient Adminstration: diet order  Knowledge/Beliefs/Attitudes: beliefs and attitudes  Physical Activity and Function: nutrition-related ADLs and IADLs, factors affecting access to physical activity, breastfeeding  Anthropometric Measurements: height/length, weight, weight change, body mass index  Biochemical Data, Medical Tests and Procedures: electrolyte and renal panel, gastrointestinal profile, glucose/endocrine profile, inflammatory profile, lipid profile     Nutrition Follow-Up    RD Follow-up?: Yes    Sherry Noguera, MS, RDN, LDN

## 2024-04-02 NOTE — SUBJECTIVE & OBJECTIVE
Interval History: Doing well, ambulating, voiding, and tolerating regular diet  Lochia: steadily decreasing  Pain: well controlled, has declined PO medication out of concern for LFT  Breasts/nipples: intact feeding well without difficulty; has consulted lactation  Depression/anxiety: denies   Support at home: good  Contraception: desires BTL; immediate PP BTL aborted yesterday, will pursue interval BTL  : girl is doing well, will f/u with pediatrician     Gen: A&O x 4, NAD  CV: normal HR  Lungs: normal resp effort  Breasts: bilaterally soft, non-tender, nipples intact   Abdomen: soft, non-tender, uterus firm at U - 1 fb  Perineum: approximated, no edema   Lochia: minimal rubra  Ext: bilaterally no pedal edema, without signs of DVT      Objective:     Vital Signs (Most Recent):  Temp: 97.8 °F (36.6 °C) (24)  Pulse: 60 (24)  Resp: 17 (24)  BP: (!) 102/59 (24)  SpO2: 96 % (24) Vital Signs (24h Range):  Temp:  [97.6 °F (36.4 °C)-98.7 °F (37.1 °C)] 97.8 °F (36.6 °C)  Pulse:  [] 60  Resp:  [17-18] 17  SpO2:  [92 %-100 %] 96 %  BP: ()/(49-89) 102/59     Weight: 103.3 kg (227 lb 11.8 oz)  Body mass index is 33.63 kg/m².      Intake/Output Summary (Last 24 hours) at 2024 0858  Last data filed at 2024 0424  Gross per 24 hour   Intake 3154.08 ml   Output 1875 ml   Net 1279.08 ml         Significant Labs:  Lab Results   Component Value Date    GROUPTRH A POS 2024    HEPBSAG Non-reactive 10/19/2023    STREPBCULT (A) 2024     STREPTOCOCCUS AGALACTIAE (GROUP B)  In case of Penicillin allergy, call lab for further testing.  Beta-hemolytic streptococci are routinely susceptible to   penicillins,cephalosporins and carbapenems.       Recent Labs   Lab 24  0518   HGB 9.3*   HCT 27.8*       I have personallly reviewed all pertinent lab results from the last 24 hours.    Physical Exam:   Constitutional: She is oriented to person, place,  and time. She appears well-developed and well-nourished. No distress.    HENT:   Head: Normocephalic and atraumatic.    Eyes: Pupils are equal, round, and reactive to light.      Pulmonary/Chest: Effort normal.        Abdominal: Soft. She exhibits abdominal incision (dressing intact, no signs of exudate, bleeding or erythema). Distension: appropriate for PPD #1.            Musculoskeletal: Normal range of motion.       Neurological: She is alert and oriented to person, place, and time.    Skin: Skin is warm and dry.    Psychiatric: She has a normal mood and affect. Her behavior is normal.       Review of Systems   Constitutional: Negative.    HENT: Negative.     Eyes: Negative.    Respiratory: Negative.     Cardiovascular: Negative.    Gastrointestinal:  Abdominal pain: cramping with breastfeeding.   Endocrine: Negative.    Genitourinary:  Vaginal bleeding: moderate lochia rubra.   Musculoskeletal: Negative.    Integumentary:  Nipple discharge: lactating. Negative.   Neurological: Negative.    Hematological: Negative.    Psychiatric/Behavioral: Negative.     Breast: Nipple discharge: lactating.

## 2024-04-02 NOTE — PLAN OF CARE
Nutrition Plan of Care:    Recommendations     1. Encourage po intake   2. Monitor labs   3. Collaboration with medical providers     Goals: Patient to consume >75% of estiamted needs prior to RD follow up.  Nutrition Goal Status: progressing towards goal  Communication of RD Recs: other (comment) (poc)     Assessment and Plan     Nutrition Problem  Increased nutritional needs     Related to (etiology):   Pregnancy with delivery  Breastfeeding      Signs and Symptoms (as evidenced by):   higher nutrient demands     Interventions (treatment strategy):  Collaboration with medical providers     Nutrition Diagnosis Status:   Improving     Malnutrition Assessment      Micronutrient Evaluation Summary: no deficiencies             Sherry Noguera, MS, RDN, LDN

## 2024-04-02 NOTE — PLAN OF CARE
VSS. Pain well controlled with oral pain medication. Ambulating independently. Benitez removed, pt due to void. Patient safety maintained, side rails up, bed low and locked position. Significant other at bedside; parents responding to infant cues and bonding appropriately. Will continue to round as needed.

## 2024-04-02 NOTE — PLAN OF CARE
VSS. RH&H 9.3/27.8. Pain controlled without scheduled medications. Breastfeeding independently. Fundus firm and midline with light lochia rubra. Voiding spontaneously with adequate output. Ambulating without difficulty. No concerns at this time.

## 2024-04-02 NOTE — PROGRESS NOTES
[facility] ([place])  Obstetrics  Postpartum Progress Note    Patient Name: Annalisa Dietrich  MRN: [id number]  Admission Date: 3/31/2024  Hospital Length of Stay: 2 days  Attending Physician: Gisselle George MD  Primary Care Provider: Devyn Duke MD    Subjective:     Principal Problem: (spontaneous vaginal delivery)    Hospital Course:  3/31/24  2300 - SVE 3/40/-3, patient continues to report lack of fetal movement. Cat 2 tracing. Admit for IOL. T&S, CBC, PCN per protocol, start pitocin  24  0430 - SVE 50/-3, patient feeling like contractions are getting stronger. Continue to titrate pitocin - currently infusing at 8 mu/min.   0815 - SVE /-2, AROM clear fluid. Patient endorses painful contractions. Pitocin at 16 mU/min    24 PPD #1, doing well, normal uterine involution. Nausea has resolved. Breastfeeding. For discharge home tomorrow.     Interval History: Doing well, ambulating, voiding, and tolerating regular diet  Lochia: steadily decreasing  Pain: well controlled, has declined PO medication out of concern for LFT  Breasts/nipples: intact feeding well without difficulty; has consulted lactation  Depression/anxiety: denies   Support at home: good  Contraception: desires BTL; immediate PP BTL aborted yesterday, will pursue interval BTL  Beauty: girl is doing well, will f/u with pediatrician     Gen: A&O x 4, NAD  CV: normal HR  Lungs: normal resp effort  Breasts: bilaterally soft, non-tender, nipples intact   Abdomen: soft, non-tender, uterus firm at U - 1 fb  Perineum: approximated, no edema   Lochia: minimal rubra  Ext: bilaterally no pedal edema, without signs of DVT      Objective:     Vital Signs (Most Recent):  Temp: 97.8 °F (36.6 °C) (24)  Pulse: 60 (24)  Resp: 17 (24)  BP: (!) 102/59 (24)  SpO2: 96 % (24) Vital Signs (24h Range):  Temp:  [97.6 °F (36.4 °C)-98.7 °F (37.1 °C)] 97.8 °F (36.6 °C)  Pulse:  []  60  Resp:  [17-18] 17  SpO2:  [92 %-100 %] 96 %  BP: ()/(49-89) 102/59     Weight: 103.3 kg (227 lb 11.8 oz)  Body mass index is 33.63 kg/m².      Intake/Output Summary (Last 24 hours) at 4/2/2024 0858  Last data filed at 4/2/2024 0424  Gross per 24 hour   Intake 3154.08 ml   Output 1875 ml   Net 1279.08 ml         Significant Labs:  Lab Results   Component Value Date    GROUPTRH A POS 03/31/2024    HEPBSAG Non-reactive 10/19/2023    STREPBCULT (A) 03/22/2024     STREPTOCOCCUS AGALACTIAE (GROUP B)  In case of Penicillin allergy, call lab for further testing.  Beta-hemolytic streptococci are routinely susceptible to   penicillins,cephalosporins and carbapenems.       Recent Labs   Lab 04/02/24  0518   HGB 9.3*   HCT 27.8*       I have personallly reviewed all pertinent lab results from the last 24 hours.    Physical Exam:   Constitutional: She is oriented to person, place, and time. She appears well-developed and well-nourished. No distress.    HENT:   Head: Normocephalic and atraumatic.    Eyes: Pupils are equal, round, and reactive to light.      Pulmonary/Chest: Effort normal.        Abdominal: Soft. She exhibits abdominal incision (dressing intact, no signs of exudate, bleeding or erythema). Distension: appropriate for PPD #1.            Musculoskeletal: Normal range of motion.       Neurological: She is alert and oriented to person, place, and time.    Skin: Skin is warm and dry.    Psychiatric: She has a normal mood and affect. Her behavior is normal.       Review of Systems   Constitutional: Negative.    HENT: Negative.     Eyes: Negative.    Respiratory: Negative.     Cardiovascular: Negative.    Gastrointestinal:  Abdominal pain: cramping with breastfeeding.   Endocrine: Negative.    Genitourinary:  Vaginal bleeding: moderate lochia rubra.   Musculoskeletal: Negative.    Integumentary:  Nipple discharge: lactating. Negative.   Neurological: Negative.    Hematological: Negative.     Psychiatric/Behavioral: Negative.     Breast: Nipple discharge: lactating.    Assessment/Plan:     24 y.o. female  for:    *  (spontaneous vaginal delivery)  Routine PP Care and advances    GBS (group B Streptococcus carrier), +RV culture, currently pregnant  PCN per protocol    Anemia affecting pregnancy in third trimester  Admit H/H 11.4/34.1  PP H/H 9.3/27.8 - patient currently asymptomatic  PO Iron        History of postpartum depression  2 week mood check        Disposition: As patient meets milestones, will plan to discharge home tomorrow.    Karen Berkowitz CNM  Obstetrics  [facility] ([place])

## 2024-04-02 NOTE — LACTATION NOTE
04/02/24 1020   Maternal Assessment   Breast Shape Left:;pendulous   Breast Density Left:;soft   Areola Left:;elastic   Nipples Left:;everted   Maternal Infant Feeding   Maternal Emotional State assist needed;relaxed   Infant Positioning clutch/football   Signs of Milk Transfer audible swallow;infant jaw motion present   Latch Assistance yes     Pt reports baby not waking for feeding.  Assisted pt with waking and latching baby to breast. Several latch attempts needed for baby to achieve a deeply latch.  Good tugs and pulls observed. Basic breastfeeding education provided. Questions answered.

## 2024-04-02 NOTE — ANESTHESIA POSTPROCEDURE EVALUATION
Anesthesia Post Evaluation    Patient: Annalisa Dietrich    Procedure(s) Performed: Procedure(s) (LRB):  LIGATION, FALLOPIAN TUBE, POSTPARTUM (Bilateral)    Final Anesthesia Type: epidural      Patient location during evaluation: labor & delivery  Patient participation: Yes- Able to Participate  Level of consciousness: awake and alert  Post-procedure vital signs: reviewed and stable  Pain management: adequate  Airway patency: patent  REED mitigation strategies: Use of major conduction anesthesia (spinal/epidural) or peripheral nerve block  PONV status at discharge: No PONV  Anesthetic complications: no      Cardiovascular status: hemodynamically stable and blood pressure returned to baseline  Respiratory status: unassisted, spontaneous ventilation and room air  Hydration status: euvolemic  Follow-up not needed.              Vitals Value Taken Time   /67 04/02/24 1248   Temp 36.8 °C (98.2 °F) 04/02/24 1555   Pulse 74 04/02/24 1248   Resp 18 04/02/24 1248   SpO2 98 % 04/02/24 1248         Event Time   Out of Recovery 04/01/2024 18:20:00         Pain/Frantz Score: Pain Rating Prior to Med Admin: 3 (4/1/2024 11:58 PM)

## 2024-04-02 NOTE — ANESTHESIA POSTPROCEDURE EVALUATION
Anesthesia Post Evaluation    Patient: Annalisa Dietrich    Procedure(s) Performed: * No procedures listed *    Final Anesthesia Type: epidural      Patient location during evaluation: labor & delivery  Patient participation: Yes- Able to Participate  Level of consciousness: awake and alert  Post-procedure vital signs: reviewed and stable  Pain management: adequate  Airway patency: patent  REED mitigation strategies: Use of major conduction anesthesia (spinal/epidural) or peripheral nerve block  PONV status at discharge: No PONV  Anesthetic complications: no      Cardiovascular status: hemodynamically stable and blood pressure returned to baseline  Respiratory status: unassisted, spontaneous ventilation and room air  Hydration status: euvolemic  Follow-up not needed.              Vitals Value Taken Time   /67 04/02/24 1248   Temp 36.8 °C (98.2 °F) 04/02/24 1555   Pulse 74 04/02/24 1248   Resp 18 04/02/24 1248   SpO2 98 % 04/02/24 1248         Event Time   Out of Recovery 04/01/2024 18:20:00         Pain/Frantz Score: Pain Rating Prior to Med Admin: 3 (4/1/2024 11:58 PM)

## 2024-04-03 VITALS
HEIGHT: 69 IN | WEIGHT: 227.75 LBS | OXYGEN SATURATION: 98 % | HEART RATE: 63 BPM | DIASTOLIC BLOOD PRESSURE: 69 MMHG | BODY MASS INDEX: 33.73 KG/M2 | SYSTOLIC BLOOD PRESSURE: 112 MMHG | TEMPERATURE: 98 F | RESPIRATION RATE: 18 BRPM

## 2024-04-03 PROCEDURE — 25000003 PHARM REV CODE 250: Performed by: GENERAL PRACTICE

## 2024-04-03 PROCEDURE — 99238 HOSP IP/OBS DSCHRG MGMT 30/<: CPT | Mod: ,,,

## 2024-04-03 PROCEDURE — 25000003 PHARM REV CODE 250

## 2024-04-03 RX ADMIN — FERROUS SULFATE TAB 325 MG (65 MG ELEMENTAL FE) 1 EACH: 325 (65 FE) TAB at 09:04

## 2024-04-03 RX ADMIN — SIMETHICONE 80 MG: 80 TABLET, CHEWABLE ORAL at 02:04

## 2024-04-03 RX ADMIN — PRENATAL VIT W/ FE FUMARATE-FA TAB 27-0.8 MG 1 TABLET: 27-0.8 TAB at 09:04

## 2024-04-03 RX ADMIN — DOCUSATE SODIUM 200 MG: 100 CAPSULE, LIQUID FILLED ORAL at 09:04

## 2024-04-03 RX ADMIN — SIMETHICONE 80 MG: 80 TABLET, CHEWABLE ORAL at 09:04

## 2024-04-03 NOTE — PLAN OF CARE
VSS. Patient ambulating, voiding, and passing flatus. Fundus firm, midline, with light lochia. Heat packs given for pain relief. Bonding appropriately with infant at bedside. Attentive to infant cues. D/C given.  No additional information at this time.

## 2024-04-03 NOTE — LACTATION NOTE
04/03/24 0915   Maternal Assessment   Breast Shape Right:;pendulous   Breast Density Right:;soft   Areola Right:;elastic   Nipples Right:;everted   Maternal Infant Feeding   Maternal Emotional State independent   Infant Positioning cradle   Signs of Milk Transfer audible swallow;infant jaw motion present   Latch Assistance no   Community Referrals   Community Referrals outpatient lactation program;support group  (community resources)     Pt latches baby to breast in cradle position independently. Good tugs and pulls observed. Discharge lactation education provided. Questions answered.pt has lactation contact number and community resources.

## 2024-04-03 NOTE — DISCHARGE SUMMARY
[facility] ([place])  Obstetrics  Discharge Summary      Patient Name: Annalisa Dietrich  MRN: [id number]  Admission Date: 3/31/2024  Hospital Length of Stay: 3 days  Discharge Date and Time:  2024 11:51 AM  Attending Physician: Gisselle George MD   Discharging Provider: Norma Morgan CN   Primary Care Provider: Devyn Duke MD    HPI:   Annalisa Dietrich is a 24 y.o. female  at 38w0d with Estimated Date of Delivery: 24 based on 1T US who presents to [facility] accompanied by partner. Expecting a girl!     Reports decreased fetal movement since noon today. She feels like she has not felt the baby move at all since 1600.  Irregular contractions, decreased  fetal movement, No vaginal bleeding , No loss of fluid.      Last ate brownie bite this morning, last drank water at now.      This pregnancy has been complicated by  Patient Active Problem List:     History of postpartum depression     Obesity affecting pregnancy in third trimester     Nausea and vomiting during pregnancy     Pregnancy     Migraine headache without aura     Weight loss, unintentional     Anemia affecting pregnancy in third trimester     Lesion of liver greater than 1 cm in diameter     History of transaminitis     Hyperemesis affecting pregnancy, antepartum     GBS (group B Streptococcus carrier), +RV culture, currently pregnant     Multigravida in third trimester        Presentation:   Cephalic per SVE and BSUS  Estimated Fetal Weight: 8 lbs     Birth Center Risk Assessment: 1-management on labor and Delivery for IOL     CNM management in ABC  CNM management on L&D  Consultation with OB to develop  plan of care  Collaborative CNM/OB management with delivery on L&D                               Procedure(s) (LRB):  LIGATION, FALLOPIAN TUBE, POSTPARTUM (Bilateral)     Hospital Course:   3/31/24  2300 - SVE 3/40/-3, patient continues to report lack of fetal movement. Cat 2 tracing. Admit for IOL. T&S, CBC, PCN per  "protocol, start pitocin  24  0430 - SVE 4/50/-3, patient feeling like contractions are getting stronger. Continue to titrate pitocin - currently infusing at 8 mu/min.   0815 - SVE /60/-2, AROM clear fluid. Patient endorses painful contractions. Pitocin at 16 mU/min    24 PPD #1, doing well, normal uterine involution. Nausea has resolved. Breastfeeding. For discharge home tomorrow.   4/3/24 PPD2 - doing well today, normal lochia, pain well controlled. Breastfeeding going well. Discharge home today.         Final Active Diagnoses:    Diagnosis Date Noted POA    PRINCIPAL PROBLEM:   (spontaneous vaginal delivery) [O80] 2024 Not Applicable    GBS (group B Streptococcus carrier), +RV culture, currently pregnant [O99.820] 2024 Not Applicable    Anemia affecting pregnancy in third trimester [O99.013] 2024 Yes    History of postpartum depression [F53.0] 02/10/2020 Yes      Problems Resolved During this Admission:    Diagnosis Date Noted Date Resolved POA    Fetal heart rate/rhythm abnormality affecting management of mother [O36.8390] 2024 Yes    Decreased fetal movements in third trimester [O36.8130] 2024 Yes        Significant Diagnostic Studies: Labs: CBC   Recent Labs   Lab 24  0518   WBC 12.68   HGB 9.3*   HCT 27.8*            Feeding Method: breast    Immunizations       None            Delivery:    Episiotomy: None   Lacerations: None   Repair suture:     Repair # of packets: 1   Blood loss (ml):       Birth information:  YOB: 2024   Time of birth: 12:24 PM   Sex: female   Delivery type: Vaginal, Spontaneous   Gestational Age: 38w1d     Measurements    Weight: 3270 g  Weight (lbs): 7 lb 3.3 oz  Length: 49.5 cm  Length (in): 19.5"  Head circumference: 35.5 cm  Chest circumference: 32.5 cm         Delivery Clinician: Delivery Providers    Delivering clinician: Graciela Duque CNM   Provider Role    Chadd Jasso RN Allday, Amy " MONAE BORRERO Jamie, ST Word, Dorothy Reeves RN              Additional  information:  Forceps:    Vacuum:    Breech:    Observed anomalies      Living?:     Apgars    Living status: Living  Apgar Component Scores:  1 min.:  5 min.:  10 min.:  15 min.:  20 min.:    Skin color:  0  1       Heart rate:  2  2       Reflex irritability:  2  2       Muscle tone:  2  2       Respiratory effort:  2  2       Total:  8  9       Apgars assigned by: CALEB PAK RN         Placenta: Delivered:       appearance  Pending Diagnostic Studies:       None            Discharged Condition: stable    Disposition: Home or Self Care    Follow Up:   Follow-up Information       GUILLE Bedolla Follow up in 6 week(s).    Specialty: Obstetrics and Gynecology  Why: postpartum visit and BTL consult  Contact information:  [address]  [facility] [address] [phone]             [facility]. Schedule an appointment as soon as possible for a visit in 2 week(s).    Specialty: Obstetrics and Gynecology  Why: Mood check - can be virtual visit  Contact information:  [address]  [facility] [address] [phone]  Additional information:  [facility] [facility] 4th Floor   Please park in [place] and use [employer] elevators                         Patient Instructions:      Diet Adult Regular     Pelvic Rest     Notify your health care provider if you experience any of the following:  temperature >100.4     Notify your health care provider if you experience any of the following:  persistent nausea and vomiting or diarrhea     Notify your health care provider if you experience any of the following:  severe uncontrolled pain     Notify your health care provider if you experience any of the following:  redness, tenderness, or signs of infection (pain, swelling, redness, odor or  green/yellow discharge around incision site)     Notify your health care provider if you experience any of the following:  difficulty breathing or increased cough     Notify your health care provider if you experience any of the following:  severe persistent headache     Notify your health care provider if you experience any of the following:  worsening rash     Notify your health care provider if you experience any of the following:  persistent dizziness, light-headedness, or visual disturbances     Notify your health care provider if you experience any of the following:  increased confusion or weakness     Notify your health care provider if you experience any of the following:     Activity as tolerated     Medications:  Current Discharge Medication List        CONTINUE these medications which have NOT CHANGED    Details   albuterol (VENTOLIN HFA) 90 mcg/actuation inhaler Inhale 2 puffs into the lungs every 6 (six) hours as needed for Wheezing. Rescue  Qty: 18 g, Refills: 3      benzonatate (TESSALON PERLES) 100 MG capsule Take 2 capsules (200 mg total) by mouth 3 (three) times daily as needed for Cough.  Qty: 30 capsule, Refills: 1      docusate sodium (COLACE) 100 MG capsule Take 1 capsule (100 mg total) by mouth 2 (two) times daily as needed for Constipation.  Qty: 30 capsule, Refills: 1    Associated Diagnoses: Encounter for supervision of other normal pregnancy in third trimester; Chronic idiopathic constipation      esomeprazole (NEXIUM) 40 MG capsule Take 1 capsule (40 mg total) by mouth before breakfast.  Qty: 30 capsule, Refills: 11      ETONOGESTREL (IMPLANON SDRM) by Subdermal route.      famotidine (PEPCID) 40 MG tablet Take 1 tablet (40 mg total) by mouth once daily.  Qty: 30 tablet, Refills: 5      ferrous sulfate (FEOSOL) 325 mg (65 mg iron) Tab tablet Take 1 tablet (325 mg total) by mouth every other day.  Qty: 30 tablet, Refills: 4    Associated Diagnoses: Anemia affecting pregnancy in second  trimester      fluticasone (FLONASE) 50 mcg/actuation nasal spray 1 spray (50 mcg total) by Each Nare route once daily.  Qty: 16 g, Refills: 0    Associated Diagnoses: Acute non-recurrent frontal sinusitis      levocetirizine (XYZAL) 5 MG tablet Take 1 tablet (5 mg total) by mouth every evening.  Qty: 30 tablet, Refills: 11    Associated Diagnoses: Acute non-recurrent frontal sinusitis      loratadine (CLARITIN) 10 mg tablet Take 1 tablet (10 mg total) by mouth once daily.  Qty: 30 tablet, Refills: 2      metoclopramide HCl (REGLAN) 10 MG tablet Take 1 tablet (10 mg total) by mouth every 6 (six) hours.  Qty: 30 tablet, Refills: 0    Associated Diagnoses: Nausea and vomiting during pregnancy      ondansetron (ZOFRAN) 4 MG tablet Take 1 tablet (4 mg total) by mouth every 6 (six) hours as needed for Nausea.  Qty: 30 tablet, Refills: 1    Associated Diagnoses: Nausea and vomiting during pregnancy; Encounter for supervision of other normal pregnancy in third trimester      prenatal vit no.126-iron-folic (CLASSIC PRENATAL) 28 mg iron- 800 mcg Tab Take 1 tablet by mouth once daily.  Qty: 90 tablet, Refills: 3    Comments: Dispense prenatal vitamin as covered by patient's insurance.  Associated Diagnoses: Encounter for supervision of other normal pregnancy in third trimester      promethazine (PHENERGAN) 12.5 MG Tab Take 1 tablet (12.5 mg total) by mouth every 4 (four) hours as needed (Nausea and vomiting).  Qty: 30 tablet, Refills: 2    Associated Diagnoses: Nausea and vomiting during pregnancy      pyridoxine, vitamin B6, (B-6) 25 MG Tab Take 1 tablet (25 mg total) by mouth every evening.  Qty: 40 tablet, Refills: 2           STOP taking these medications       aspirin 81 MG Chew Comments:   Reason for Stopping:               Norma Morgan CNM  Obstetrics  [facility] ([place])

## 2024-04-03 NOTE — SUBJECTIVE & OBJECTIVE
Interval History: PPD2    She is doing well this morning. She is tolerating a regular diet without nausea or vomiting. She is voiding spontaneously. She is ambulating. She has passed flatus, and has not a BM. Vaginal bleeding is mild. She denies fever or chills. Abdominal pain is mild and controlled with oral medications. She Is breastfeeding. She desires circumcision for her male baby: not applicable.    Objective:     Vital Signs (Most Recent):  Temp: 97.7 °F (36.5 °C) (04/03/24 0831)  Pulse: 63 (04/03/24 0831)  Resp: 18 (04/03/24 0831)  BP: 112/69 (04/03/24 0831)  SpO2: 98 % (04/03/24 0831) Vital Signs (24h Range):  Temp:  [97.7 °F (36.5 °C)-98.2 °F (36.8 °C)] 97.7 °F (36.5 °C)  Pulse:  [60-75] 63  Resp:  [18] 18  SpO2:  [96 %-98 %] 98 %  BP: (102-123)/(54-69) 112/69     Weight: 103.3 kg (227 lb 11.8 oz)  Body mass index is 33.63 kg/m².    No intake or output data in the 24 hours ending 04/03/24 1147      Significant Labs:  Lab Results   Component Value Date    GROUPTRH A POS 03/31/2024    HEPBSAG Non-reactive 10/19/2023    STREPBCULT (A) 03/22/2024     STREPTOCOCCUS AGALACTIAE (GROUP B)  In case of Penicillin allergy, call lab for further testing.  Beta-hemolytic streptococci are routinely susceptible to   penicillins,cephalosporins and carbapenems.       Recent Labs   Lab 04/02/24  0518   HGB 9.3*   HCT 27.8*       I have personallly reviewed all pertinent lab results from the last 24 hours.    Physical Exam:   Constitutional: She is oriented to person, place, and time. She appears well-developed and well-nourished.    HENT:   Head: Normocephalic and atraumatic.    Eyes: Conjunctivae are normal.     Cardiovascular:  Normal rate.             Pulmonary/Chest: Effort normal.        Abdominal: Soft. There is no abdominal tenderness.     Genitourinary: There is vaginal discharge (lochia) in the vagina.           Musculoskeletal: Normal range of motion.       Neurological: She is alert and oriented to person, place,  and time.    Skin: Skin is warm and dry.    Psychiatric: She has a normal mood and affect. Her behavior is normal. Judgment and thought content normal.       Review of Systems   Constitutional:  Negative for chills and fever.   Eyes: Negative.    Respiratory: Negative.     Cardiovascular: Negative.    Gastrointestinal:  Positive for abdominal pain (cramping). Negative for nausea and vomiting.   Endocrine: Negative.    Genitourinary:  Positive for vaginal bleeding (lochia). Negative for vaginal odor.   Musculoskeletal: Negative.    Integumentary:  : bandage clean, dry, intact, no drainage. Negative.   Neurological: Negative.  Negative for syncope.   Hematological: Negative.    Psychiatric/Behavioral: Negative.     Breast: negative.

## 2024-04-03 NOTE — PROGRESS NOTES
[facility] ([place])  Obstetrics  Postpartum Progress Note    Patient Name: Annalisa Dietrich  MRN: [id number]  Admission Date: 3/31/2024  Hospital Length of Stay: 3 days  Attending Physician: Gisselle George MD  Primary Care Provider: Devyn Duke MD    Subjective:     Principal Problem: (spontaneous vaginal delivery)    Hospital Course:  3/31/24  2300 - SVE 3/40/-3, patient continues to report lack of fetal movement. Cat 2 tracing. Admit for IOL. T&S, CBC, PCN per protocol, start pitocin  24  0430 - SVE 50/-3, patient feeling like contractions are getting stronger. Continue to titrate pitocin - currently infusing at 8 mu/min.   0815 - SVE /-2, AROM clear fluid. Patient endorses painful contractions. Pitocin at 16 mU/min    24 PPD #1, doing well, normal uterine involution. Nausea has resolved. Breastfeeding. For discharge home tomorrow.   4/3/24 PPD2 - doing well today, normal lochia, pain well controlled. Breastfeeding going well. Discharge home today.    Interval History: PPD2    She is doing well this morning. She is tolerating a regular diet without nausea or vomiting. She is voiding spontaneously. She is ambulating. She has passed flatus, and has not a BM. Vaginal bleeding is mild. She denies fever or chills. Abdominal pain is mild and controlled with oral medications. She Is breastfeeding. She desires circumcision for her male baby: not applicable.    Objective:     Vital Signs (Most Recent):  Temp: 97.7 °F (36.5 °C) (24)  Pulse: 63 (24)  Resp: 18 (24)  BP: 112/69 (24)  SpO2: 98 % (24) Vital Signs (24h Range):  Temp:  [97.7 °F (36.5 °C)-98.2 °F (36.8 °C)] 97.7 °F (36.5 °C)  Pulse:  [60-75] 63  Resp:  [18] 18  SpO2:  [96 %-98 %] 98 %  BP: (102-123)/(54-69) 112/69     Weight: 103.3 kg (227 lb 11.8 oz)  Body mass index is 33.63 kg/m².    No intake or output data in the 24 hours ending 24 1147      Significant Labs:  Lab  Results   Component Value Date    GROUPTRH A POS 2024    HEPBSAG Non-reactive 10/19/2023    STREPBCULT (A) 2024     STREPTOCOCCUS AGALACTIAE (GROUP B)  In case of Penicillin allergy, call lab for further testing.  Beta-hemolytic streptococci are routinely susceptible to   penicillins,cephalosporins and carbapenems.       Recent Labs   Lab 24  0518   HGB 9.3*   HCT 27.8*       I have personallly reviewed all pertinent lab results from the last 24 hours.    Physical Exam:   Constitutional: She is oriented to person, place, and time. She appears well-developed and well-nourished.    HENT:   Head: Normocephalic and atraumatic.    Eyes: Conjunctivae are normal.     Cardiovascular:  Normal rate.             Pulmonary/Chest: Effort normal.        Abdominal: Soft. There is no abdominal tenderness.     Genitourinary: There is vaginal discharge (lochia) in the vagina.           Musculoskeletal: Normal range of motion.       Neurological: She is alert and oriented to person, place, and time.    Skin: Skin is warm and dry.    Psychiatric: She has a normal mood and affect. Her behavior is normal. Judgment and thought content normal.       Review of Systems   Constitutional:  Negative for chills and fever.   Eyes: Negative.    Respiratory: Negative.     Cardiovascular: Negative.    Gastrointestinal:  Positive for abdominal pain (cramping). Negative for nausea and vomiting.   Endocrine: Negative.    Genitourinary:  Positive for vaginal bleeding (lochia). Negative for vaginal odor.   Musculoskeletal: Negative.    Integumentary:  : bandage clean, dry, intact, no drainage. Negative.   Neurological: Negative.  Negative for syncope.   Hematological: Negative.    Psychiatric/Behavioral: Negative.     Breast: negative.      Assessment/Plan:     24 y.o. female  for:    *  (spontaneous vaginal delivery)  4/3/24 PPD2 - doing well today, normal lochia, pain well controlled. Breastfeeding going well. Discharge home  today.    Anemia affecting pregnancy in third trimester  Admit H/H 11.4/34.1  PP H/H 9.3/27.8 - patient currently asymptomatic  PO Iron    History of postpartum depression  2 week mood check        Disposition: As patient meets milestones, will plan to discharge today.    Norma Morgan CNM  Obstetrics  [facility] - [facility] ([place])

## 2024-04-03 NOTE — ASSESSMENT & PLAN NOTE
4/3/24 PPD2 - doing well today, normal lochia, pain well controlled. Breastfeeding going well. Discharge home today.

## 2024-04-04 ENCOUNTER — PATIENT MESSAGE (OUTPATIENT)
Dept: OBSTETRICS AND GYNECOLOGY | Facility: OTHER | Age: 24
End: 2024-04-04
Payer: MEDICAID

## 2024-04-22 ENCOUNTER — OFFICE VISIT (OUTPATIENT)
Dept: FAMILY MEDICINE | Facility: HOSPITAL | Age: 24
End: 2024-04-22
Payer: MEDICAID

## 2024-04-22 VITALS
DIASTOLIC BLOOD PRESSURE: 77 MMHG | WEIGHT: 216.94 LBS | HEIGHT: 69 IN | HEART RATE: 91 BPM | SYSTOLIC BLOOD PRESSURE: 128 MMHG | BODY MASS INDEX: 32.13 KG/M2

## 2024-04-22 DIAGNOSIS — Z13.29 SCREENING FOR THYROID DISORDER: ICD-10-CM

## 2024-04-22 DIAGNOSIS — Z13.0 SCREENING FOR DEFICIENCY ANEMIA: ICD-10-CM

## 2024-04-22 DIAGNOSIS — Z13.1 SCREENING FOR DIABETES MELLITUS (DM): ICD-10-CM

## 2024-04-22 DIAGNOSIS — D22.9 MULTIPLE ATYPICAL SKIN MOLES: Primary | ICD-10-CM

## 2024-04-22 DIAGNOSIS — Z13.6 ENCOUNTER FOR LIPID SCREENING FOR CARDIOVASCULAR DISEASE: ICD-10-CM

## 2024-04-22 DIAGNOSIS — Z13.220 ENCOUNTER FOR LIPID SCREENING FOR CARDIOVASCULAR DISEASE: ICD-10-CM

## 2024-04-22 DIAGNOSIS — Z91.89 ELECTROLYTE IMBALANCE RISK: ICD-10-CM

## 2024-04-22 PROCEDURE — 99215 OFFICE O/P EST HI 40 MIN: CPT

## 2024-04-22 NOTE — PROGRESS NOTES
"  [facility] FAMILY MEDICINE CLINIC NOTE  New Patient Visit      SUBJECTIVE:     Patient: Annalisa Dietrich is a 24 y.o. female.    Chief Complaint:   Chief Complaint   Patient presents with    Establish Care       History of Present Illness:  Patient presents to clinic as a new patient here to establish care with PCP  Enquiring about dermatology referral due to multiple moles that have been gradually increasing in size  Reports that she has 3 moles - one on her left thigh that is getting bigger and associated with intermittent itchiness  Reports mole on lower back over the last 3-4 years  Has a mole on her right shoulder  She reports all 3 moles have been "spreading out"  Patient reports family history of skin cancer in her mother, maternal grandmother with melanoma, and 1st cousin on maternal side dx with skin cancer at age 31 y/o    Social History:  Smoker - none  EtOH use - infrequently   Drug use - Patient denies illicit drug use       Review of patient's allergies indicates:  No Known Allergies    Past Medical History:   Diagnosis Date    Anemia affecting pregnancy in first trimester 08/29/2023    Lesion of liver greater than 1 cm in diameter 2/9/2024    Migraine     Postpartum depression 02/10/2020       Current Outpatient Medications   Medication Instructions    albuterol (VENTOLIN HFA) 90 mcg/actuation inhaler 2 puffs, Inhalation, Every 6 hours PRN, Rescue    benzonatate (TESSALON PERLES) 200 mg, Oral, 3 times daily PRN    docusate sodium (COLACE) 100 mg, Oral, 2 times daily PRN    esomeprazole (NEXIUM) 40 mg, Oral, Before breakfast    ETONOGESTREL (IMPLANON SDRM) by Subdermal route.    famotidine (PEPCID) 40 mg, Oral, Daily    ferrous sulfate (FEOSOL) 325 mg, Oral, Every other day    fluticasone propionate (FLONASE) 50 mcg, Each Nostril, Daily    levocetirizine (XYZAL) 5 mg, Oral, Nightly    loratadine (CLARITIN) 10 mg, Oral, Daily    metoclopramide HCl (REGLAN) 10 mg, Oral, Every 6 hours    ondansetron (ZOFRAN) 4 " "mg, Oral, Every 6 hours PRN    prenatal vit no.126-iron-folic (CLASSIC PRENATAL) 28 mg iron- 800 mcg Tab 1 tablet, Oral, Daily    promethazine (PHENERGAN) 12.5 mg, Oral, Every 4 hours PRN    pyridoxine (vitamin B6) (B-6) 25 mg, Oral, Nightly       Past Surgical History:   Procedure Laterality Date    POSTPARTUM LIGATION OF FALLOPIAN TUBE Bilateral 4/1/2024    Procedure: LIGATION, FALLOPIAN TUBE, POSTPARTUM;  Surgeon: Gisselle George MD;  [facility];  Service: OB/GYN;  Laterality: Bilateral;       Family History   Problem Relation Name Age of Onset    Cancer Maternal Grandmother      Hypertension Maternal Grandmother      Hypertension Mother      Arrhythmia Neg Hx      Cardiomyopathy Neg Hx      Congenital heart disease Neg Hx      Heart attacks under age 50 Neg Hx      Pacemaker/defibrilator Neg Hx         Social History     Tobacco Use    Smoking status: Never    Smokeless tobacco: Never   Substance Use Topics    Alcohol use: No    Drug use: No          ROS  A 10+ review of systems was performed with pertinent positives and negatives noted above in the history of present illness. Other systems were negative unless otherwise specified.    OBJECTIVE:     Vital Signs (Most Recent)  Vitals:    04/22/24 0936   BP: 128/77   Pulse: 91   Weight: 98.4 kg (216 lb 14.9 oz)   Height: 5' 9" (1.753 m)     BMI: Body mass index is 32.04 kg/m².     Physical Exam:  Gen: No apparent distress, cooperative & interactive  CV: RRR, S1 and S2 present, no LE edema  Resp: CTAB, normal respiratory effort   Skin: see images below      Mole midline lower back 0.4 x 0.4 cm      Mole R shoulder 1 cm by 0.4 cm      Mole L thigh 0.9 x 0.6 cm      ASSESSMENT:   Annalisa Dietrich is a 24 y.o. female who presents to clinic to for    1. Multiple atypical skin moles    2. Screening for thyroid disorder    3. Screening for diabetes mellitus (DM)    4. Screening for deficiency anemia    5. Encounter for lipid screening for cardiovascular disease "    6. Electrolyte imbalance risk    7. BMI 31.0-31.9,adult         PLAN:     1. Screening for thyroid disorder  - TSH; Future    2. Screening for diabetes mellitus (DM)  - Hemoglobin A1C; Future    3. Screening for deficiency anemia  - CBC Auto Differential; Future    4. Encounter for lipid screening for cardiovascular disease  - Lipid Panel; Future    5. Electrolyte imbalance risk  - Comprehensive Metabolic Panel; Future    6. Multiple atypical skin moles  - see images above  - referred to derm due to atypical moles and Fhx of skin cancer:  - Ambulatory referral/consult to Dermatology; Future    7. BMI 31.0-31.9,adult  - continue lifestyle modification with diet and exercise  - Comprehensive Metabolic Panel; Future      Provided patient with anticipatory guidance and return precautions. Treatment plan discussed with patient, all questions answered, and patient acknowledged understanding and verbal agreement.      Follow-up in: 1 month; or sooner PRN if acute concerns arise.      Case discussed with Dr. ABELARDO Plunkett MD, MPH  [facility] PGY-2  04/22/2024        The following information is provided to all patients.  This information is to help you find resources for any of the problems found today that may be affecting your health:                Living healthy guide: www.[facility].[place].gov       Understanding Diabetes: www.diabetes.org       Eating healthy: www.cdc.gov/healthyweight      CDC home safety checklist: www.cdc.gov/steadi/patient.html      Agency on Aging: www.goea.[place].[facility]       Alcoholics anonymous (AA): www.aa.org      Physical Activity: www.morro.nih.gov/ru4fkrj       Tobacco use: www.quitwithusla.org

## 2024-04-25 ENCOUNTER — OFFICE VISIT (OUTPATIENT)
Dept: OBSTETRICS AND GYNECOLOGY | Facility: CLINIC | Age: 24
End: 2024-04-25
Payer: MEDICAID

## 2024-04-25 VITALS
DIASTOLIC BLOOD PRESSURE: 82 MMHG | SYSTOLIC BLOOD PRESSURE: 132 MMHG | HEIGHT: 69 IN | BODY MASS INDEX: 31.9 KG/M2 | WEIGHT: 215.38 LBS

## 2024-04-25 DIAGNOSIS — Z30.09 UNWANTED FERTILITY: Primary | ICD-10-CM

## 2024-04-25 PROCEDURE — 99214 OFFICE O/P EST MOD 30 MIN: CPT | Mod: PBBFAC | Performed by: STUDENT IN AN ORGANIZED HEALTH CARE EDUCATION/TRAINING PROGRAM

## 2024-04-25 PROCEDURE — 99999 PR PBB SHADOW E&M-EST. PATIENT-LVL IV: CPT | Mod: PBBFAC,,, | Performed by: STUDENT IN AN ORGANIZED HEALTH CARE EDUCATION/TRAINING PROGRAM

## 2024-04-25 NOTE — PROGRESS NOTES
History & Physical  Gynecology      SUBJECTIVE:     Chief Complaint: Postpartum Care       History of Present Illness:  Annalisa Dietrich is a 23 yo  who presents today to discuss tubal ligation. She had an uncomplicated vaginal delivery on . A postpartum tubal ligation was attempted, however the procedure was aborted due to inability to safely visualize fallopian tubes secondary to overlying bowel and depth of abdominal cavity.  Today the patient reports that she still strongly desires tubal ligation. She declines other forms of contraception, including LARCs. She previously had Nexplanon, but had to get it removed after 2.5 years due to heavy vaginal bleeding. She is certain that she does not want future children.      Review of patient's allergies indicates:  No Known Allergies    Past Medical History:   Diagnosis Date    Anemia affecting pregnancy in first trimester 2023    Lesion of liver greater than 1 cm in diameter 2024    Migraine     Postpartum depression 02/10/2020     Past Surgical History:   Procedure Laterality Date    POSTPARTUM LIGATION OF FALLOPIAN TUBE Bilateral 2024    Procedure: LIGATION, FALLOPIAN TUBE, POSTPARTUM;  Surgeon: Gisselle George MD;  Location: [facility] L&D;  Service: OB/GYN;  Laterality: Bilateral;     OB History          4    Para   3    Term   3       0    AB   1    Living   3         SAB   1    IAB   0    Ectopic   0    Multiple   0    Live Births   3               Family History   Problem Relation Name Age of Onset    Cancer Maternal Grandmother      Hypertension Maternal Grandmother      Hypertension Mother      Arrhythmia Neg Hx      Cardiomyopathy Neg Hx      Congenital heart disease Neg Hx      Heart attacks under age 50 Neg Hx      Pacemaker/defibrilator Neg Hx       Social History     Tobacco Use    Smoking status: Never    Smokeless tobacco: Never   Substance Use Topics    Alcohol use: No    Drug use: No       Current Outpatient  Medications   Medication Sig Dispense Refill    fluticasone (FLONASE) 50 mcg/actuation nasal spray 1 spray (50 mcg total) by Each Nare route once daily. 16 g 0    metoclopramide HCl (REGLAN) 10 MG tablet Take 1 tablet (10 mg total) by mouth every 6 (six) hours. 30 tablet 0    albuterol (VENTOLIN HFA) 90 mcg/actuation inhaler Inhale 2 puffs into the lungs every 6 (six) hours as needed for Wheezing. Rescue (Patient not taking: Reported on 4/25/2024) 18 g 3    benzonatate (TESSALON PERLES) 100 MG capsule Take 2 capsules (200 mg total) by mouth 3 (three) times daily as needed for Cough. (Patient not taking: Reported on 4/25/2024) 30 capsule 1    docusate sodium (COLACE) 100 MG capsule Take 1 capsule (100 mg total) by mouth 2 (two) times daily as needed for Constipation. (Patient not taking: Reported on 4/25/2024) 30 capsule 1    esomeprazole (NEXIUM) 40 MG capsule Take 1 capsule (40 mg total) by mouth before breakfast. (Patient not taking: Reported on 4/25/2024) 30 capsule 11    ETONOGESTREL (IMPLANON SDRM) by Subdermal route. (Patient not taking: Reported on 4/25/2024)      famotidine (PEPCID) 40 MG tablet Take 1 tablet (40 mg total) by mouth once daily. (Patient not taking: Reported on 4/25/2024) 30 tablet 5    ferrous sulfate (FEOSOL) 325 mg (65 mg iron) Tab tablet Take 1 tablet (325 mg total) by mouth every other day. (Patient not taking: Reported on 4/25/2024) 30 tablet 4    levocetirizine (XYZAL) 5 MG tablet Take 1 tablet (5 mg total) by mouth every evening. 30 tablet 11    loratadine (CLARITIN) 10 mg tablet Take 1 tablet (10 mg total) by mouth once daily. 30 tablet 2    ondansetron (ZOFRAN) 4 MG tablet Take 1 tablet (4 mg total) by mouth every 6 (six) hours as needed for Nausea. (Patient not taking: Reported on 4/25/2024) 30 tablet 1    prenatal vit no.126-iron-folic (CLASSIC PRENATAL) 28 mg iron- 800 mcg Tab Take 1 tablet by mouth once daily. (Patient not taking: Reported on 4/25/2024) 90 tablet 3     promethazine (PHENERGAN) 12.5 MG Tab Take 1 tablet (12.5 mg total) by mouth every 4 (four) hours as needed (Nausea and vomiting). (Patient not taking: Reported on 4/25/2024) 30 tablet 2    pyridoxine, vitamin B6, (B-6) 25 MG Tab Take 1 tablet (25 mg total) by mouth every evening. (Patient not taking: Reported on 4/25/2024) 40 tablet 2     Current Facility-Administered Medications   Medication Dose Route Frequency Provider Last Rate Last Admin    ondansetron injection 4 mg  4 mg Intravenous Q6H PRN Lorie Oseguera, MARK             Review of Systems:  Review of Systems   Constitutional:  Negative for chills and fever.   Respiratory:  Negative for shortness of breath.    Cardiovascular:  Negative for chest pain.   Gastrointestinal:  Negative for abdominal pain, nausea and vomiting.   Endocrine: Negative for hot flashes.   Genitourinary:  Negative for menstrual problem.   Integumentary:  Negative for breast mass, nipple discharge and breast skin changes.   Neurological:  Negative for headaches.   Hematological:  Does not bruise/bleed easily.   Psychiatric/Behavioral:  Negative for depression.    Breast: Negative for mass, mastodynia, nipple discharge and skin changes       OBJECTIVE:     Physical Exam:  Physical Exam  Exam conducted with a chaperone present.   Constitutional:       Appearance: Normal appearance.   HENT:      Head: Normocephalic and atraumatic.   Eyes:      Conjunctiva/sclera: Conjunctivae normal.      Pupils: Pupils are equal, round, and reactive to light.   Cardiovascular:      Rate and Rhythm: Normal rate and regular rhythm.   Pulmonary:      Effort: Pulmonary effort is normal. No respiratory distress.   Abdominal:      General: Abdomen is flat. There is no distension.      Palpations: Abdomen is soft.      Tenderness: There is no abdominal tenderness.   Genitourinary:     General: Normal vulva.      Vagina: Bleeding (minimal blood noted on glove after bimanual exam) present. No vaginal discharge or  tenderness.      Cervix: No cervical motion tenderness.      Comments: 10 week size uterus on bimanual exam  Musculoskeletal:         General: Normal range of motion.      Cervical back: Normal range of motion.   Neurological:      Mental Status: She is alert.   Psychiatric:         Mood and Affect: Mood normal.         Thought Content: Thought content normal.           ASSESSMENT:       ICD-10-CM ICD-9-CM    1. Unwanted fertility  Z30.09 V25.09 Case Request Operating Room: SALPINGECTOMY, LAPAROSCOPIC             Plan:      Annalisa was seen today for postpartum care.    Diagnoses and all orders for this visit:    Unwanted fertility  -     Patient continues to desire permanent sterilization  -     Surgery consents signed and case request placed for May 13th  -     Patient will contact clinic if issues regarding Medicaid renewal process as her coverage expires on May 1st. If so will need to re-schedule surgery  -     Medicaid tubal consent forms signed 02/08/2024  -     Case Request Operating Room: SALPINGECTOMY, LAPAROSCOPIC        No orders of the defined types were placed in this encounter.      No follow-ups on file.    Counseling time: 15 minutes    Hermilo Peterson

## 2024-04-30 PROBLEM — D22.9 MULTIPLE ATYPICAL SKIN MOLES: Status: ACTIVE | Noted: 2024-04-30

## 2024-05-02 NOTE — PROGRESS NOTES
I assume primary medical responsibility for this patient. I have reviewed the history, physical, and assessement & treatment plan with the resident and agree that the care is reasonable and necessary. This service has been performed by a resident without the presence of a teaching physician under the primary care exception. If necessary, an addendum of additional findings or evaluation beyond the resident documentation will be noted below.      Petty Dyson MD    \[facility]\"" Family Medicine

## 2024-05-07 ENCOUNTER — TELEPHONE (OUTPATIENT)
Dept: OBSTETRICS AND GYNECOLOGY | Facility: HOSPITAL | Age: 24
End: 2024-05-07

## 2024-05-07 NOTE — TELEPHONE ENCOUNTER
Spoke with patient regarding delay in Medicaid renewal process in setting of upcoming laparoscopic salpingectomy. Patient reports that she has submitted additional documents for insurance renewal, however was not given information on when/if insurance policy would be renewed. We will plan to cancel surgery on 05/13 and will re-schedule once insurance coverage obtained. Patient not sexually active at this time. Discussed use of condoms for contraception if plans to resume intercourse.     Hermilo Peterson M.D.  OB/GYN PGY- 4

## 2024-06-14 ENCOUNTER — PATIENT MESSAGE (OUTPATIENT)
Dept: PREADMISSION TESTING | Facility: OTHER | Age: 24
End: 2024-06-14
Payer: MEDICAID

## 2024-06-14 ENCOUNTER — TELEPHONE (OUTPATIENT)
Dept: OBSTETRICS AND GYNECOLOGY | Facility: HOSPITAL | Age: 24
End: 2024-06-14
Payer: COMMERCIAL

## 2024-06-14 DIAGNOSIS — Z30.09 UNWANTED FERTILITY: Primary | ICD-10-CM

## 2024-06-14 NOTE — TELEPHONE ENCOUNTER
Called patient to discuss rescheduling salpingectomy. Patient was initially scheduled for lsc BS 5/13, however, her medicaid was no longer effective and she could not proceed with surgery. Patient reports she was approved for Medicaid recently and would like to reschedule surgery.    Offered 7/8/24, however, patient is going on vacation this week and would like surgery sooner.    Discussed with Dr. Boykin, GYN staff, who agrees to schedule surgery 6/18. Case request placed and scheduled for 11am. Patient instructed to present at 9am.     Patient is aware this is permanent and desires to proceed. All questions answered. Will update consents day of surgery.     Shea Livingston MD  OBGYN, PGY-3

## 2024-06-14 NOTE — TELEPHONE ENCOUNTER
Attempted to call patient to discuss insurance coverage and re-scheduling laparoscopic salpingectomy for July 8th. No answer, unable to leave voicemail. Will attempt to contact again later.    Connie Cardona MD  OB/GYN PGY1

## 2024-06-14 NOTE — PRE-PROCEDURE INSTRUCTIONS
Information to Prepare you for your Surgery    PRE-ADMIT TESTING -  [phone]    [address]  [facility]          Your surgery has been scheduled at [facility]. We are pleased to have the opportunity to serve you. For Further Information please call [phone].    On the day of surgery please report to the Information Desk on the 1st floor.    CONTACT YOUR PHYSICIAN'S OFFICE THE DAY PRIOR TO YOUR SURGERY TO OBTAIN YOUR ARRIVAL TIME.     The evening before surgery do not eat anything after 9 p.m. ( this includes hard candy, chewing gum and mints).  You may only have GATORADE, POWERADE AND WATER  from 9 p.m. until you leave your home.   DO NOT DRINK ANY LIQUIDS ON THE WAY TO THE HOSPITAL.      Why does your anesthesiologist allow you to drink Gatorade/Powerade before surgery?  Gatorade/Powerade helps to increase your comfort before surgery and to decrease your nausea after surgery. The carbohydrates in Gatorade/Powerade help reduce your body's stress response to surgery.  If you are a diabetic-drink only water prior to surgery.    Outpatient Surgery- May allow 2 adult (18 and older) Support Persons (1 being the designated ) for all surgical/procedural patients. A breastfeeding mother will be allowed her infant and 2 adult Support Persons. No one under the age of 18 will be allowed in the building. No swapping out of visitors in the [facility].      SPECIAL MEDICATION INSTRUCTIONS: TAKE medications checked off by the Anesthesiologist on your Medication List.    Angiogram Patients: Take medications as instructed by your physician, including aspirin.     Surgery Patients:    If you take ASPIRIN - Your PHYSICIAN/SURGEON will need to inform you IF/OR when you need to stop taking aspirin prior to your surgery.     The week prior to surgery do not ot take any medications containing IBUPROFEN or NSAIDS ( Advil, Motrin, Goodys, BC, Aleve, Naproxen etc)  If you are not sure if you should take a medicine please call your surgeon's office.  Ok to take Tylenol    Do Not Wear any make-up (especially eye make-up) to surgery. Please remove any false eyelashes or eyelash extensions. If you arrive the day of surgery with makeup/eyelashes on you will be required to remove prior to surgery. (There is a risk of corneal abrasions if eye makeup/eyelash extensions are not removed)      Leave all valuables at home.   Do Not wear any jewelry or watches, including any metal in body piercings. Jewelry must be removed prior to coming to the hospital.  There is a possibility that rings that are unable to be removed may be cut off if they are on the surgical extremity.    Please remove all hair extensions, wigs, clips and any other metal accessories/ ornaments from your hair.  These items may pose a flammable/fire risk in Surgery and must be removed.    Do not shave your surgical area at least 5 days prior to your surgery. The surgical prep will be performed at the hospital according to Infection Control regulations.    Contact Lens must be removed before surgery. Either do not wear the contact lens or bring a case and solution for storage.  Please bring a container for eyeglasses or dentures as required.  Bring any paperwork your physician has provided, such as consent forms,  history and physicals, doctor's orders, etc.   Bring comfortable clothes that are loose fitting to wear upon discharge. Take into consideration the type of surgery being performed.  Maintain your diet as advised per your physician the day prior to surgery.      Adequate rest the night before surgery is advised.   Park in the Parking lot behind the hospital or in the [facility] across the street from the parking lot. Parking is complimentary.  If you will be discharged the same day as your procedure, please arrange for a responsible adult to drive you home or to accompany you if traveling by taxi.    YOU WILL NOT BE PERMITTED TO DRIVE OR TO LEAVE THE HOSPITAL ALONE AFTER SURGERY.   If you are being discharged the same day, it is strongly recommended that you arrange for someone to remain with you for the first 24 hrs following your surgery.    The Surgeon will speak to your family/visitor after your surgery regarding the outcome of your surgery and post op care.  The Surgeon may speak to you after your surgery, but there is a possibility you may not remember the details.  Please check with your family members regarding the conversation with the Surgeon.    We strongly recommend whoever is bringing you home be present for discharge instructions.  This will ensure a thorough understanding for your post op home care.          Thank you for your cooperation.  The Staff of [facility].            Bathing Instructions with Hibiclens    Shower the evening before and morning of your procedure with Chlorhexidine (Hibiclens)  do not use Chlorhexidine on your face or genitals. Do not get in your eyes.  Wash your face with water and your regular face wash/soap  Use your regular shampoo  Apply Chlorhexidine (Hibiclens) directly on your skin or on a wet washcloth and wash gently. When showering: Move away from the shower stream when applying Chlorhexidine (Hibiclens) to avoid rinsing off too soon.  Rinse thoroughly with warm water  Do not dilute Chlorhexidine (Hibiclens)   Dry off as usual, do not use any deodorant, powder, body lotions, perfume, after shave or cologne.

## 2024-06-17 ENCOUNTER — ANESTHESIA EVENT (OUTPATIENT)
Dept: SURGERY | Facility: OTHER | Age: 24
End: 2024-06-17
Payer: MEDICAID

## 2024-06-18 ENCOUNTER — TELEPHONE (OUTPATIENT)
Dept: OBSTETRICS AND GYNECOLOGY | Facility: CLINIC | Age: 24
End: 2024-06-18
Payer: MEDICAID

## 2024-06-18 ENCOUNTER — ANESTHESIA (OUTPATIENT)
Dept: SURGERY | Facility: OTHER | Age: 24
End: 2024-06-18
Payer: MEDICAID

## 2024-06-18 ENCOUNTER — HOSPITAL ENCOUNTER (OUTPATIENT)
Facility: OTHER | Age: 24
Discharge: HOME | End: 2024-06-18
Attending: OBSTETRICS & GYNECOLOGY | Admitting: OBSTETRICS & GYNECOLOGY
Payer: MEDICAID

## 2024-06-18 VITALS
RESPIRATION RATE: 16 BRPM | TEMPERATURE: 98 F | SYSTOLIC BLOOD PRESSURE: 128 MMHG | DIASTOLIC BLOOD PRESSURE: 73 MMHG | HEIGHT: 69 IN | WEIGHT: 216 LBS | HEART RATE: 73 BPM | OXYGEN SATURATION: 98 % | BODY MASS INDEX: 31.99 KG/M2

## 2024-06-18 DIAGNOSIS — Z90.79 STATUS POST BILATERAL SALPINGECTOMY: Primary | ICD-10-CM

## 2024-06-18 DIAGNOSIS — Z30.2 STERILIZATION: ICD-10-CM

## 2024-06-18 DIAGNOSIS — Z30.2 STERILIZATION: Primary | ICD-10-CM

## 2024-06-18 LAB
B-HCG UR QL: NEGATIVE
CTP QC/QA: YES

## 2024-06-18 PROCEDURE — 63600175 PHARM REV CODE 636 W HCPCS: Performed by: NURSE ANESTHETIST, CERTIFIED REGISTERED

## 2024-06-18 PROCEDURE — 36000708 HC OR TIME LEV III 1ST 15 MIN: Performed by: OBSTETRICS & GYNECOLOGY

## 2024-06-18 PROCEDURE — 25000003 PHARM REV CODE 250: Performed by: ANESTHESIOLOGY

## 2024-06-18 PROCEDURE — 71000033 HC RECOVERY, INTIAL HOUR: Performed by: OBSTETRICS & GYNECOLOGY

## 2024-06-18 PROCEDURE — 36000709 HC OR TIME LEV III EA ADD 15 MIN: Performed by: OBSTETRICS & GYNECOLOGY

## 2024-06-18 PROCEDURE — 58661 LAPAROSCOPY REMOVE ADNEXA: CPT | Mod: 50,,, | Performed by: OBSTETRICS & GYNECOLOGY

## 2024-06-18 PROCEDURE — 71000039 HC RECOVERY, EACH ADD'L HOUR: Performed by: OBSTETRICS & GYNECOLOGY

## 2024-06-18 PROCEDURE — 27201423 OPTIME MED/SURG SUP & DEVICES STERILE SUPPLY: Performed by: OBSTETRICS & GYNECOLOGY

## 2024-06-18 PROCEDURE — 88302 TISSUE EXAM BY PATHOLOGIST: CPT | Mod: 26,,, | Performed by: PATHOLOGY

## 2024-06-18 PROCEDURE — 88302 TISSUE EXAM BY PATHOLOGIST: CPT | Mod: 59 | Performed by: PATHOLOGY

## 2024-06-18 PROCEDURE — 71000015 HC POSTOP RECOV 1ST HR: Performed by: OBSTETRICS & GYNECOLOGY

## 2024-06-18 PROCEDURE — 71000016 HC POSTOP RECOV ADDL HR: Performed by: OBSTETRICS & GYNECOLOGY

## 2024-06-18 PROCEDURE — 25000003 PHARM REV CODE 250: Performed by: NURSE ANESTHETIST, CERTIFIED REGISTERED

## 2024-06-18 PROCEDURE — 25000003 PHARM REV CODE 250: Performed by: OBSTETRICS & GYNECOLOGY

## 2024-06-18 PROCEDURE — 37000008 HC ANESTHESIA 1ST 15 MINUTES: Performed by: OBSTETRICS & GYNECOLOGY

## 2024-06-18 PROCEDURE — 81025 URINE PREGNANCY TEST: CPT | Performed by: ANESTHESIOLOGY

## 2024-06-18 PROCEDURE — 37000009 HC ANESTHESIA EA ADD 15 MINS: Performed by: OBSTETRICS & GYNECOLOGY

## 2024-06-18 PROCEDURE — 63600175 PHARM REV CODE 636 W HCPCS: Performed by: ANESTHESIOLOGY

## 2024-06-18 RX ORDER — MUPIROCIN 20 MG/G
OINTMENT TOPICAL
Status: CANCELLED | OUTPATIENT
Start: 2024-06-18

## 2024-06-18 RX ORDER — MUPIROCIN 20 MG/G
OINTMENT TOPICAL
Status: DISCONTINUED | OUTPATIENT
Start: 2024-06-18 | End: 2024-06-18 | Stop reason: HOSPADM

## 2024-06-18 RX ORDER — FAMOTIDINE 20 MG/1
20 TABLET, FILM COATED ORAL
Status: CANCELLED | OUTPATIENT
Start: 2024-06-18

## 2024-06-18 RX ORDER — OXYCODONE HYDROCHLORIDE 5 MG/1
5 TABLET ORAL
Status: DISCONTINUED | OUTPATIENT
Start: 2024-06-18 | End: 2024-06-18 | Stop reason: HOSPADM

## 2024-06-18 RX ORDER — LIDOCAINE HYDROCHLORIDE 10 MG/ML
0.5 INJECTION, SOLUTION EPIDURAL; INFILTRATION; INTRACAUDAL; PERINEURAL ONCE
Status: DISCONTINUED | OUTPATIENT
Start: 2024-06-18 | End: 2024-06-18 | Stop reason: HOSPADM

## 2024-06-18 RX ORDER — DOCUSATE SODIUM 100 MG/1
200 CAPSULE, LIQUID FILLED ORAL 2 TIMES DAILY
Qty: 60 CAPSULE | Refills: 5 | Status: SHIPPED | OUTPATIENT
Start: 2024-06-18

## 2024-06-18 RX ORDER — SODIUM CHLORIDE 0.9 % (FLUSH) 0.9 %
3 SYRINGE (ML) INJECTION
Status: DISCONTINUED | OUTPATIENT
Start: 2024-06-18 | End: 2024-06-18 | Stop reason: HOSPADM

## 2024-06-18 RX ORDER — MIDAZOLAM HYDROCHLORIDE 1 MG/ML
INJECTION INTRAMUSCULAR; INTRAVENOUS
Status: DISCONTINUED | OUTPATIENT
Start: 2024-06-18 | End: 2024-06-18

## 2024-06-18 RX ORDER — KETOROLAC TROMETHAMINE 30 MG/ML
INJECTION, SOLUTION INTRAMUSCULAR; INTRAVENOUS
Status: DISCONTINUED | OUTPATIENT
Start: 2024-06-18 | End: 2024-06-18

## 2024-06-18 RX ORDER — IBUPROFEN 600 MG/1
600 TABLET ORAL EVERY 6 HOURS
Qty: 30 TABLET | Refills: 0 | Status: SHIPPED | OUTPATIENT
Start: 2024-06-18

## 2024-06-18 RX ORDER — FENTANYL CITRATE 50 UG/ML
INJECTION, SOLUTION INTRAMUSCULAR; INTRAVENOUS
Status: DISCONTINUED | OUTPATIENT
Start: 2024-06-18 | End: 2024-06-18

## 2024-06-18 RX ORDER — PROCHLORPERAZINE EDISYLATE 5 MG/ML
5 INJECTION INTRAMUSCULAR; INTRAVENOUS EVERY 30 MIN PRN
Status: DISCONTINUED | OUTPATIENT
Start: 2024-06-18 | End: 2024-06-18 | Stop reason: HOSPADM

## 2024-06-18 RX ORDER — SODIUM CHLORIDE 9 MG/ML
INJECTION, SOLUTION INTRAVENOUS CONTINUOUS
Status: DISCONTINUED | OUTPATIENT
Start: 2024-06-18 | End: 2024-06-18 | Stop reason: HOSPADM

## 2024-06-18 RX ORDER — ROCURONIUM BROMIDE 10 MG/ML
INJECTION, SOLUTION INTRAVENOUS
Status: DISCONTINUED | OUTPATIENT
Start: 2024-06-18 | End: 2024-06-18

## 2024-06-18 RX ORDER — OXYCODONE HYDROCHLORIDE 5 MG/1
5 TABLET ORAL ONCE
Status: COMPLETED | OUTPATIENT
Start: 2024-06-18 | End: 2024-06-18

## 2024-06-18 RX ORDER — DIPHENHYDRAMINE HCL 25 MG
25 CAPSULE ORAL ONCE
Status: COMPLETED | OUTPATIENT
Start: 2024-06-18 | End: 2024-06-18

## 2024-06-18 RX ORDER — LIDOCAINE HYDROCHLORIDE 20 MG/ML
INJECTION INTRAVENOUS
Status: DISCONTINUED | OUTPATIENT
Start: 2024-06-18 | End: 2024-06-18

## 2024-06-18 RX ORDER — DEXAMETHASONE SODIUM PHOSPHATE 4 MG/ML
INJECTION, SOLUTION INTRA-ARTICULAR; INTRALESIONAL; INTRAMUSCULAR; INTRAVENOUS; SOFT TISSUE
Status: DISCONTINUED | OUTPATIENT
Start: 2024-06-18 | End: 2024-06-18

## 2024-06-18 RX ORDER — HYDROMORPHONE HYDROCHLORIDE 2 MG/ML
0.4 INJECTION, SOLUTION INTRAMUSCULAR; INTRAVENOUS; SUBCUTANEOUS EVERY 5 MIN PRN
Status: DISCONTINUED | OUTPATIENT
Start: 2024-06-18 | End: 2024-06-18 | Stop reason: HOSPADM

## 2024-06-18 RX ORDER — SODIUM CHLORIDE 9 MG/ML
INJECTION, SOLUTION INTRAVENOUS CONTINUOUS
Status: CANCELLED | OUTPATIENT
Start: 2024-06-18

## 2024-06-18 RX ORDER — POLYETHYLENE GLYCOL 3350 17 G/17G
17 POWDER, FOR SOLUTION ORAL DAILY
Qty: 510 G | Refills: 3 | Status: SHIPPED | OUTPATIENT
Start: 2024-06-18

## 2024-06-18 RX ORDER — PROPOFOL 10 MG/ML
VIAL (ML) INTRAVENOUS
Status: DISCONTINUED | OUTPATIENT
Start: 2024-06-18 | End: 2024-06-18

## 2024-06-18 RX ORDER — FAMOTIDINE 20 MG/1
20 TABLET, FILM COATED ORAL
Status: DISCONTINUED | OUTPATIENT
Start: 2024-06-18 | End: 2024-06-18 | Stop reason: HOSPADM

## 2024-06-18 RX ORDER — ONDANSETRON HYDROCHLORIDE 2 MG/ML
INJECTION, SOLUTION INTRAMUSCULAR; INTRAVENOUS
Status: DISCONTINUED | OUTPATIENT
Start: 2024-06-18 | End: 2024-06-18

## 2024-06-18 RX ORDER — SODIUM CHLORIDE, SODIUM LACTATE, POTASSIUM CHLORIDE, CALCIUM CHLORIDE 600; 310; 30; 20 MG/100ML; MG/100ML; MG/100ML; MG/100ML
INJECTION, SOLUTION INTRAVENOUS CONTINUOUS
Status: DISCONTINUED | OUTPATIENT
Start: 2024-06-18 | End: 2024-06-18 | Stop reason: HOSPADM

## 2024-06-18 RX ORDER — MEPERIDINE HYDROCHLORIDE 25 MG/ML
12.5 INJECTION INTRAMUSCULAR; INTRAVENOUS; SUBCUTANEOUS ONCE AS NEEDED
Status: DISCONTINUED | OUTPATIENT
Start: 2024-06-18 | End: 2024-06-18 | Stop reason: HOSPADM

## 2024-06-18 RX ORDER — OXYCODONE AND ACETAMINOPHEN 5; 325 MG/1; MG/1
1 TABLET ORAL EVERY 4 HOURS PRN
Qty: 8 EACH | Refills: 0 | Status: SHIPPED | OUTPATIENT
Start: 2024-06-18

## 2024-06-18 RX ADMIN — PROPOFOL 40 MG: 10 INJECTION, EMULSION INTRAVENOUS at 11:06

## 2024-06-18 RX ADMIN — LIDOCAINE HYDROCHLORIDE 50 MG: 20 INJECTION, SOLUTION INTRAVENOUS at 10:06

## 2024-06-18 RX ADMIN — PROPOFOL 60 MG: 10 INJECTION, EMULSION INTRAVENOUS at 10:06

## 2024-06-18 RX ADMIN — DEXAMETHASONE SODIUM PHOSPHATE 8 MG: 4 INJECTION, SOLUTION INTRAMUSCULAR; INTRAVENOUS at 10:06

## 2024-06-18 RX ADMIN — PROPOFOL 50 MG: 10 INJECTION, EMULSION INTRAVENOUS at 11:06

## 2024-06-18 RX ADMIN — ROCURONIUM BROMIDE 50 MG: 10 SOLUTION INTRAVENOUS at 10:06

## 2024-06-18 RX ADMIN — FENTANYL CITRATE 50 MCG: 50 INJECTION, SOLUTION INTRAMUSCULAR; INTRAVENOUS at 11:06

## 2024-06-18 RX ADMIN — MUPIROCIN: 20 OINTMENT TOPICAL at 09:06

## 2024-06-18 RX ADMIN — CARBOXYMETHYLCELLULOSE SODIUM 2 DROP: 2.5 SOLUTION/ DROPS OPHTHALMIC at 10:06

## 2024-06-18 RX ADMIN — KETOROLAC TROMETHAMINE 30 MG: 30 INJECTION, SOLUTION INTRAMUSCULAR; INTRAVENOUS at 11:06

## 2024-06-18 RX ADMIN — PROPOFOL 200 MG: 10 INJECTION, EMULSION INTRAVENOUS at 10:06

## 2024-06-18 RX ADMIN — OXYCODONE HYDROCHLORIDE 5 MG: 5 TABLET ORAL at 12:06

## 2024-06-18 RX ADMIN — FENTANYL CITRATE 100 MCG: 50 INJECTION, SOLUTION INTRAMUSCULAR; INTRAVENOUS at 10:06

## 2024-06-18 RX ADMIN — MIDAZOLAM HYDROCHLORIDE 2 MG: 1 INJECTION, SOLUTION INTRAMUSCULAR; INTRAVENOUS at 10:06

## 2024-06-18 RX ADMIN — SODIUM CHLORIDE, SODIUM LACTATE, POTASSIUM CHLORIDE, AND CALCIUM CHLORIDE: .6; .31; .03; .02 INJECTION, SOLUTION INTRAVENOUS at 10:06

## 2024-06-18 RX ADMIN — HYDROMORPHONE HYDROCHLORIDE 0.4 MG: 2 INJECTION INTRAMUSCULAR; INTRAVENOUS; SUBCUTANEOUS at 12:06

## 2024-06-18 RX ADMIN — FAMOTIDINE 20 MG: 20 TABLET ORAL at 10:06

## 2024-06-18 RX ADMIN — OXYCODONE HYDROCHLORIDE 5 MG: 5 TABLET ORAL at 02:06

## 2024-06-18 RX ADMIN — ONDANSETRON 4 MG: 2 INJECTION INTRAMUSCULAR; INTRAVENOUS at 10:06

## 2024-06-18 RX ADMIN — DIPHENHYDRAMINE HYDROCHLORIDE 25 MG: 25 CAPSULE ORAL at 01:06

## 2024-06-18 NOTE — TRANSFER OF CARE
"Anesthesia Transfer of Care Note    Patient: Annalisa Dietrich    Procedure(s) Performed: Procedure(s) (LRB):  SALPINGECTOMY, LAPAROSCOPIC (N/A)    Patient location: PACU    Anesthesia Type: general    Transport from OR: Transported from OR on 6-10 L/min O2 by face mask with adequate spontaneous ventilation    Post pain: adequate analgesia    Post assessment: no apparent anesthetic complications    Post vital signs: stable    Level of consciousness: awake    Nausea/Vomiting: no nausea/vomiting    Complications: none    Transfer of care protocol was followed      Last vitals: Visit Vitals  /81   Pulse 66   Temp 36.5 °C (97.7 °F) (Oral)   Resp 18   Ht 5' 9" (1.753 m)   Wt 98 kg (216 lb)   SpO2 99%   Breastfeeding Yes   BMI 31.90 kg/m²     "

## 2024-06-18 NOTE — H&P
Annalisa Dietrich is 24 y.o.  presenting for scheduled laparoscopic salpingectomy.    Temp:  [97.7 °F (36.5 °C)] 97.7 °F (36.5 °C)  Pulse:  [66] 66  Resp:  [18] 18  SpO2:  [99 %] 99 %  BP: (131)/(81) 131/81    General: NAD, alert, oriented, cooperative  HEENT: NCAT, EOM grossly intact  Lungs: Normal WOB  Heart: regular rate  Abdomen: soft, nondistended, nontender, no rebound or guarding    Consents in chart. Pre-operative heparin not indicated. All questions answered and concerns addressed. To OR for planned procedure.       Connie Cardona MD  OB/GYN PGY1       History & Physical  Gynecology        SUBJECTIVE:      Chief Complaint: Postpartum Care        History of Present Illness:  Annalisa Dietrich is a 23 yo  who presents today to discuss tubal ligation. She had an uncomplicated vaginal delivery on . A postpartum tubal ligation was attempted, however the procedure was aborted due to inability to safely visualize fallopian tubes secondary to overlying bowel and depth of abdominal cavity.  Today the patient reports that she still strongly desires tubal ligation. She declines other forms of contraception, including LARCs. She previously had Nexplanon, but had to get it removed after 2.5 years due to heavy vaginal bleeding. She is certain that she does not want future children.        Review of patient's allergies indicates:  No Known Allergies          Past Medical History:   Diagnosis Date    Anemia affecting pregnancy in first trimester 2023    Lesion of liver greater than 1 cm in diameter 2024    Migraine      Postpartum depression 02/10/2020            Past Surgical History:   Procedure Laterality Date    POSTPARTUM LIGATION OF FALLOPIAN TUBE Bilateral 2024     Procedure: LIGATION, FALLOPIAN TUBE, POSTPARTUM;  Surgeon: Gisselle George MD;  Location: [facility] L&D;  Service: OB/GYN;  Laterality: Bilateral;      OB History            4    Para   3    Term   3       0    AB    1    Living   3           SAB   1    IAB   0    Ectopic   0    Multiple   0    Live Births   3                         Family History   Problem Relation Name Age of Onset    Cancer Maternal Grandmother        Hypertension Maternal Grandmother        Hypertension Mother        Arrhythmia Neg Hx        Cardiomyopathy Neg Hx        Congenital heart disease Neg Hx        Heart attacks under age 50 Neg Hx        Pacemaker/defibrilator Neg Hx          Social History   Social History           Tobacco Use    Smoking status: Never    Smokeless tobacco: Never   Substance Use Topics    Alcohol use: No    Drug use: No                   Current Outpatient Medications   Medication Sig Dispense Refill    fluticasone (FLONASE) 50 mcg/actuation nasal spray 1 spray (50 mcg total) by Each Nare route once daily. 16 g 0    metoclopramide HCl (REGLAN) 10 MG tablet Take 1 tablet (10 mg total) by mouth every 6 (six) hours. 30 tablet 0    albuterol (VENTOLIN HFA) 90 mcg/actuation inhaler Inhale 2 puffs into the lungs every 6 (six) hours as needed for Wheezing. Rescue (Patient not taking: Reported on 4/25/2024) 18 g 3    benzonatate (TESSALON PERLES) 100 MG capsule Take 2 capsules (200 mg total) by mouth 3 (three) times daily as needed for Cough. (Patient not taking: Reported on 4/25/2024) 30 capsule 1    docusate sodium (COLACE) 100 MG capsule Take 1 capsule (100 mg total) by mouth 2 (two) times daily as needed for Constipation. (Patient not taking: Reported on 4/25/2024) 30 capsule 1    esomeprazole (NEXIUM) 40 MG capsule Take 1 capsule (40 mg total) by mouth before breakfast. (Patient not taking: Reported on 4/25/2024) 30 capsule 11    ETONOGESTREL (IMPLANON SDRM) by Subdermal route. (Patient not taking: Reported on 4/25/2024)        famotidine (PEPCID) 40 MG tablet Take 1 tablet (40 mg total) by mouth once daily. (Patient not taking: Reported on 4/25/2024) 30 tablet 5    ferrous sulfate (FEOSOL) 325 mg (65 mg iron) Tab tablet Take 1  tablet (325 mg total) by mouth every other day. (Patient not taking: Reported on 4/25/2024) 30 tablet 4    levocetirizine (XYZAL) 5 MG tablet Take 1 tablet (5 mg total) by mouth every evening. 30 tablet 11    loratadine (CLARITIN) 10 mg tablet Take 1 tablet (10 mg total) by mouth once daily. 30 tablet 2    ondansetron (ZOFRAN) 4 MG tablet Take 1 tablet (4 mg total) by mouth every 6 (six) hours as needed for Nausea. (Patient not taking: Reported on 4/25/2024) 30 tablet 1    prenatal vit no.126-iron-folic (CLASSIC PRENATAL) 28 mg iron- 800 mcg Tab Take 1 tablet by mouth once daily. (Patient not taking: Reported on 4/25/2024) 90 tablet 3    promethazine (PHENERGAN) 12.5 MG Tab Take 1 tablet (12.5 mg total) by mouth every 4 (four) hours as needed (Nausea and vomiting). (Patient not taking: Reported on 4/25/2024) 30 tablet 2    pyridoxine, vitamin B6, (B-6) 25 MG Tab Take 1 tablet (25 mg total) by mouth every evening. (Patient not taking: Reported on 4/25/2024) 40 tablet 2                Current Facility-Administered Medications   Medication Dose Route Frequency Provider Last Rate Last Admin    ondansetron injection 4 mg  4 mg Intravenous Q6H Lorie Wharton, MARK                Review of Systems:  Review of Systems   Constitutional:  Negative for chills and fever.   Respiratory:  Negative for shortness of breath.    Cardiovascular:  Negative for chest pain.   Gastrointestinal:  Negative for abdominal pain, nausea and vomiting.   Endocrine: Negative for hot flashes.   Genitourinary:  Negative for menstrual problem.   Integumentary:  Negative for breast mass, nipple discharge and breast skin changes.   Neurological:  Negative for headaches.   Hematological:  Does not bruise/bleed easily.   Psychiatric/Behavioral:  Negative for depression.    Breast: Negative for mass, mastodynia, nipple discharge and skin changes        OBJECTIVE:      Physical Exam:  Physical Exam  Exam conducted with a chaperone present.    Constitutional:       Appearance: Normal appearance.   HENT:      Head: Normocephalic and atraumatic.   Eyes:      Conjunctiva/sclera: Conjunctivae normal.      Pupils: Pupils are equal, round, and reactive to light.   Cardiovascular:      Rate and Rhythm: Normal rate and regular rhythm.   Pulmonary:      Effort: Pulmonary effort is normal. No respiratory distress.   Abdominal:      General: Abdomen is flat. There is no distension.      Palpations: Abdomen is soft.      Tenderness: There is no abdominal tenderness.   Genitourinary:     General: Normal vulva.      Vagina: Bleeding (minimal blood noted on glove after bimanual exam) present. No vaginal discharge or tenderness.      Cervix: No cervical motion tenderness.      Comments: 10 week size uterus on bimanual exam  Musculoskeletal:         General: Normal range of motion.      Cervical back: Normal range of motion.   Neurological:      Mental Status: She is alert.   Psychiatric:         Mood and Affect: Mood normal.         Thought Content: Thought content normal.               ASSESSMENT:          ICD-10-CM ICD-9-CM     1. Unwanted fertility  Z30.09 V25.09 Case Request Operating Room: SALPINGECTOMY, LAPAROSCOPIC                Plan:      Annalisa was seen today for postpartum care.     Diagnoses and all orders for this visit:     Unwanted fertility  -     Patient continues to desire permanent sterilization  -     Surgery consents signed and case request placed for May 13th  -     Patient will contact clinic if issues regarding Medicaid renewal process as her coverage expires on May 1st. If so will need to re-schedule surgery  -     Medicaid tubal consent forms signed 02/08/2024  -     Case Request Operating Room: SALPINGECTOMY, LAPAROSCOPIC           No orders of the defined types were placed in this encounter.        No follow-ups on file.     Counseling time: 15 minutes     Hermilo Peterson

## 2024-06-18 NOTE — ANESTHESIA PREPROCEDURE EVALUATION
06/18/2024  Annalisa Dietrich is a 24 y.o., female.      Pre-op Assessment    I have reviewed the Patient Summary Reports.     I have reviewed the Nursing Notes.    I have reviewed the Medications.     Review of Systems  Anesthesia Hx:  No previous Anesthesia   History of prior surgery of interest to airway management or planning:          Denies Family Hx of Anesthesia complications.    Denies Personal Hx of Anesthesia complications.                    Social:  Non-Smoker       Hematology/Oncology:    Oncology Normal    -- Anemia:                                  EENT/Dental:  EENT/Dental Normal           Cardiovascular:  Cardiovascular Normal                                            Pulmonary:  Pulmonary Normal                       Renal/:  Renal/ Normal                 Hepatic/GI:      Liver Disease,            Musculoskeletal:  Musculoskeletal Normal                Neurological:      Headaches                                 Endocrine:  Endocrine Normal            Dermatological:  Skin Normal    Psych:  Psychiatric History  depression            Physical Exam  General: Cooperative, Alert and Oriented    Airway:  Mallampati: II   Mouth Opening: Normal  Neck ROM: Normal ROM    Dental:  Intact    Anesthesia Plan  Type of Anesthesia, risks & benefits discussed:    Anesthesia Type: Gen ETT  Intra-op Monitoring Plan: Standard ASA Monitors  Post Op Pain Control Plan: multimodal analgesia  Induction:  IV  Airway Plan: Video  Informed Consent: Informed consent signed with the Patient and all parties understand the risks and agree with anesthesia plan.  All questions answered.   ASA Score: 2    Ready For Surgery From Anesthesia Perspective.   .

## 2024-06-18 NOTE — TELEPHONE ENCOUNTER
----- Message from Connie Cardona MD sent at 6/18/2024 11:59 AM CDT -----  Regarding: Post Op  Hello,    This patient had surgery today and needs a post op visit in the GYN resident clinic in 4 weeks.    Thank you,    Connie Cardona MD  OB/GYN PGY1

## 2024-06-18 NOTE — DISCHARGE SUMMARY
"Discharge Summary  Gynecology      Admit Date: 2024    Discharge Date and Time: 2024     Attending Physician: Dayana Boykin MD    Principal Diagnoses: Status post bilateral salpingectomy    Active Hospital Problems    Diagnosis  POA    *Status post bilateral salpingectomy 24 [Z90.79]  Not Applicable      Resolved Hospital Problems   No resolved problems to display.       Procedures: Procedure(s) (LRB):  SALPINGECTOMY, LAPAROSCOPIC (N/A)    Discharged Condition: good    Hospital Course:   Annalisa Dietrich is a 24 y.o. y.o.  female who presented on 2024   for above procedures for the treatment of undesired fertility. Patient tolerated procedure. PMH not significant. Post-operative course was uncomplicated.  On day of discharge, patient was urinating, ambulating, and tolerating a regular diet without difficulty. Pain was well controlled on PO medication. She was discharged home on POD#0 in stable condition with instructions to follow up with Gyn Resident clinic in 4 weeks.     Consults: None    Significant Diagnostic Studies:  No results for input(s): "WBC", "HGB", "HCT", "MCV", "PLT" in the last 168 hours.     Treatments:   1. Surgery as above    Disposition: Home or Self Care    Patient Instructions:   Current Discharge Medication List        START taking these medications    Details   docusate sodium (COLACE) 100 MG capsule Take 2 capsules (200 mg total) by mouth 2 (two) times daily.  Qty: 60 capsule, Refills: 5      ibuprofen (ADVIL,MOTRIN) 600 MG tablet Take 1 tablet (600 mg total) by mouth every 6 (six) hours.  Qty: 30 tablet, Refills: 0      oxyCODONE-acetaminophen (PERCOCET) 5-325 mg per tablet Take 1 tablet by mouth every 4 (four) hours as needed for Pain.  Qty: 8 each, Refills: 0    Comments: Quantity prescribed more than 7 day supply? No      polyethylene glycol (GLYCOLAX) 17 gram/dose powder Take 17 g by mouth once daily.  Qty: 510 g, Refills: 3             Discharge Procedure " Orders   Diet Adult Regular     No driving until:   Order Comments: No driving until not taking narcotic pain medication.     Pelvic Rest   Order Comments: Pelvic rest until 6 weeks after discharge. Nothing in vagina -no sex, tampons, douching, etc.     Notify your health care provider if you experience any of the following:  temperature >100.4     Notify your health care provider if you experience any of the following:  persistent nausea and vomiting or diarrhea     Notify your health care provider if you experience any of the following:  severe uncontrolled pain     Notify your health care provider if you experience any of the following:  redness, tenderness, or signs of infection (pain, swelling, redness, odor or green/yellow discharge around incision site)     Notify your health care provider if you experience any of the following:  difficulty breathing or increased cough     Notify your health care provider if you experience any of the following:  severe persistent headache     Notify your health care provider if you experience any of the following:  worsening rash     Notify your health care provider if you experience any of the following:  persistent dizziness, light-headedness, or visual disturbances     Notify your health care provider if you experience any of the following:  increased confusion or weakness     Notify your health care provider if you experience any of the following:   Order Comments: Heavy vaginal bleeding saturating more than 1 pad per hr for at least consecutive 2 hrs.     Activity as tolerated        Follow-up Information       [facility] Follow up in 4 week(s).    Why: post op                            Connie Cardona MD  OB/GYN PGY1

## 2024-06-18 NOTE — PLAN OF CARE
Annalisa Dietrich has met all discharge criteria from Phase II. Vital Signs are stable, ambulating  without difficulty.Pain is now under control and tolerable for the pt. Pain score 4   at this time.  Discharge instructions given, patient verbalized understanding. Discharged from facility via wheelchair in stable condition.

## 2024-06-18 NOTE — DISCHARGE INSTRUCTIONS
Anesthesia: After Your Surgery  Youve just had surgery. During surgery, you received medication called anesthesia to keep you comfortable and pain-free. After surgery, you may experience some pain or nausea. This is common. Here are some tips for feeling better and recovering after surgery.    Going home  Your doctor or nurse will show you how to take care of yourself when you go home. He or she will also answer your questions. Have an adult family member or friend drive you home. For the first 24 hours after your surgery:  Do not drive or use heavy equipment.  Do not make important decisions or sign legal documents.  Avoid alcohol.  Have someone stay with you, if needed. He or she can watch for problems and help keep you safe.  Take your time getting up from a seated or lying position. You may experience dizziness for 24 hours  Be sure to keep all follow-up appointments with your doctor. And rest after your procedure for as long as your doctor tells you to.    Coping with pain  If you have pain after surgery, pain medication will help you feel better. Take it as directed, before pain becomes severe. Also, ask your doctor or pharmacist about other ways to control pain, such as with heat, ice, and relaxation. And follow any other instructions your surgeon or nurse gives you.    URINARY RETENTION  Should you experience a decrease in your urine output or are unable to urinate following surgery, this can be due to the medications given during surgery.  We recommend you going to the nearest Emergency Department.    Tips for taking pain medication  To get the best relief possible, remember these points:  Pain medications can upset your stomach. Taking them with a little food may help.  Most pain relievers taken by mouth need at least 20 to 30 minutes to take effect.  Taking medication on a schedule can help you remember to take it. Try to time your medication so that you can take it before beginning an activity, such  as dressing, walking, or sitting down for dinner.  Constipation is a common side effect of pain medications. Contact your doctor before taking any medications like laxatives or stool softeners to help relieve constipation. Also ask about any dietary restrictions, because drinking lots of fluids and eating foods like fruits and vegetables that are high in fiber can also help. Remember, dont take laxatives unless your surgeon has prescribed them.  Mixing alcohol and pain medication can cause dizziness and slow your breathing. It can even be fatal. Dont drink alcohol while taking pain medication.  Pain medication can slow your reflexes. Dont drive or operate machinery while taking pain medication.  If your health care provider tells you to take acetaminophen to help relieve your pain, ask him or her how much you are supposed to take each day. (Acetaminophen is the generic name for Tylenol and other brand-name pain relievers.) Acetaminophen or other pain relievers may interact with your prescription medicines or other over-the-counter (OTC) drugs. Some prescription medications contain acetaminophen along with other active ingredients. Using both prescription and OTC acetaminophen for pain can cause you to overdose. The FDA recommends that you read the labels on your OTC medications carefully. This will help you to clearly understand the list of active ingredients, dosing instructions, and any warnings. It may also help you avoid taking too much acetaminophen. If you have questions or don't understand the information, ask your pharmacist or health care provider to explain it to you before you take the OTC medication.    Managing nausea  Some people have an upset stomach after surgery. This is often due to anesthesia, pain, pain medications, or the stress of surgery. The following tips will help you manage nausea and get good nutrition as you recover. If you were on a special diet before surgery, ask your doctor if you  should follow it during recovery. These tips may help:  Dont push yourself to eat. Your body will tell you when to eat and how much.  Start off with clear liquids and soup. They are easier to digest.  Progress to semi-solid foods (mashed potatoes, applesauce, and gelatin) as you feel ready.  Slowly move to solid foods. Dont eat fatty, rich, or spicy foods at first.  Dont force yourself to have three large meals a day. Instead, eat smaller amounts more often.  Take pain medications with a small amount of solid food, such as crackers or toast to avoid nausea.      Call your surgeon if    You feel too sleepy, dizzy, or groggy (medication may be too strong).  You have side effects like nausea, vomiting, or skin changes (rash, itching, or hives).   © 4870-4002 [employer]. [address] [place] [address]. All rights reserved. This information is not intended as a substitute for professional medical care. Always follow your healthcare professional's instructions.      Abdominal Laparoscopy Discharge Instructions      Activity  Limit your activity for 4-6 weeks.  Dont lift anything heavier than 5-10 pounds.  Avoid strenuous activities, such as mowing the lawn, vacuuming, or playing sports.  Limit your activity to short, slow walks. Gradually increase your pace and distance as you feel able.  Listen to your body. If an activity causes pain, stop.  Rest when you are tired.  Dont have sexual intercourse or use tampons or douches until your doctor says its safe to do so.    Home Care  Always keep your incision clean and dry  Shower as instructed in 24 hours. You may wash your incision gently with mild soap and warm water and pat dry.  Avoid tub baths, hot tubs and swimming pools until seen by your physician for a post-op follow up.  If you have steri strips they should fall off in 7-10 days.    If you have band aids covering your incisions change daily or when soiled.  The band aids may be  removed post op day 1 if they are clean and dry.  Take your medication exactly as instructed by your doctor.  Return to your diet as you feel able. Eat a healthy, well-balanced diet.  Avoid constipation.  Use laxatives, stool softeners, or enemas as directed by your doctor.  Eat more high-fiber foods.  Drink 6-8 glasses of water every day, unless directed otherwise.  Follow-Up  Make a follow-up appointment as directed by our staff.       When to Call Your Doctor  Call your doctor right away if you have any of the following:  Fever above 101.5°F  (38.6°C) or chills  Bright red vaginal bleeding or a foul-smelling discharge  Vaginal bleeding that soaks more than one sanitary pad per hour  Trouble urinating or burning sensation when you urinate  Severe abdominal pain or bloating  Redness, swelling, or drainage at your incision site  Shortness of breath        ________________________________________________________________

## 2024-06-18 NOTE — PLAN OF CARE
The patient verbalized understanding of the surgical procedure as explained and documented on the consent form  by the physician and  has no further questions at this time. The surgical consent was signed by the patient and the patient's signature was witnessed by the RN. The patient verbalized understanding of the surgical procedure as explained and documented on the consent form  by the physician and  has no further questions at this time. The surgical consent was signed by the patient and the patient's signature was witnessed by the RN.

## 2024-06-18 NOTE — ANESTHESIA PROCEDURE NOTES
Intubation    Date/Time: 6/18/2024 10:43 AM    Performed by: Lorie Pineda CRNA  Authorized by: Ayden Wan MD    Intubation:     Induction:  Intravenous    Intubated:  Postinduction    Mask Ventilation:  Easy mask    Attempts:  1    Attempted By:  CRNA    Method of Intubation:  Video laryngoscopy    Blade:  Diaz 3    Laryngeal View Grade: Grade I - full view of cords      Difficult Airway Encountered?: No      Complications:  None    Airway Device:  Oral endotracheal tube    Airway Device Size:  7.5    Style/Cuff Inflation:  Cuffed    Inflation Amount (mL):  5    Tube secured:  22    Secured at:  The lips    Placement Verified By:  Capnometry    Complicating Factors:  None    Findings Post-Intubation:  BS equal bilateral and atraumatic/condition of teeth unchanged

## 2024-06-18 NOTE — OP NOTE
OPERATIVE REPORT    DATE OF OPERATION: 2024     PREOPERATIVE DIAGNOSIS:   Undesired fertility      POSTOPERATIVE DIAGNOSIS:   Same  S/p laparoscopic bilateral salpingectomy     Procedure:   Laparoscopic bilateral salpingectomy     SURGEON:  Dayana Boykin MD     ASSISTANT: Racheal Washington MD - PGY-2  Connie Cardona MD - PGY1    ANESTHESIA: General     COMPLICATIONS: Uterine perforation, cauterized with monopolar scissors     EBL: <10 mL    IVF: See anesthesia records     UOP: 130 mL    FINDINGS:   Cervix did not pull. Normal appearing uterus, ovaries, fallopian tubes. Uterus was noted to be perforated with Hulka manipulator, cauterized with monopolar scissors. Both fallopian tubes were removed without difficulty. Hemostasis was noted at the completion of the procedure.    SPECIMENS:  left fallopian tube  right fallopian tube    INDICATION FOR OPERATION:   This patient is a 24 y.o.  with undesired fertility. Full details and risks of the procedure were discussed at length with the patient, including permanence of the procedure, and the patient was in full agreement.     PROCEDURE IN DETAIL:   The patient was taken to the operating room, where general anesthesia was administered and found to be adequate. She was then placed in dorsal lithotomy position using Yellow Fin stirrups. Bimanual exam was performed revealing retroflexed uterus. She was prepped and draped in the usual sterile manner, both vaginally and abdominally. A surgical timeout was performed with patient's name, date of birth, allergies, and procedure to be performed verbalized. All OR staff were in agreement. No preoperative antibiotics were administered as none were indicated.    A speculum was then placed in the vagina, and the anterior lip of the cervix was grasped with a single tooth tenaculum. A Hulka uterine manipulator was placed in through the cervix. The speculum and tenaculum were then removed. A cobian catheter was also  placed.    Gloves were changed. An incision was made below the umbilicus. A Veress needle was inserted into the umbilicus under tenting of the anterior abdominal wall. Placement into the peritoneal cavity could not be confirmed via saline drop test. A 5 mm trocar was introduced through the infraumbilical incision site under direct visualization. The abdomen was insufflated to 15mm Hg using Carbon dioxide. This showed good entry to the peritoneal cavity. The abdominal cavity was thoroughly surveyed. No abnormalities noted. Normal appearing ovaries and fallopian tubes bilaterally were noted. The Hulka manipulator was noted to have perforated through the anterior uterine fundus. The Hulka manipulator was removed and replaced with [place] manipulator.     Then, an incision was made in the right lower quadrant, and 5mm trocar was introduced under direct visualization. An incision was also made in the left lower quadrant, and 5mm trocar was introduced, also under direct visualization. Good hemostasis was noted and there was no evidence of injury from abdominal entry. The end of the left fallopian tube was grasped to provide traction and the Ligasure was used to remove the entire fallopian tube, paying careful attention to stay directly inferior to the tube and away from the ovary. The left tube was then transected at the cornua of the uterus. The fallopian tube was removed via the left port and handed off to pathology. Excellent hemostasis was noted. The same process was repeated for the right tube with excellent hemostasis noted. Uterine perforation injury was reexamined and monopolar scissors were used to cauterize the injury. Good hemostasis was noted.     The abdomen was deflated and all trocars were removed. The skin was closed with 4-0 monocryl in a subcuticular fashion. Sponge, lap, and needle counts were correct x 2. The coiban and uterine manipulator were removed. The patient was taken to the recovery room in stable  condition.     Connie Cardona MD  OB/GYN PGY1

## 2024-06-18 NOTE — ANESTHESIA POSTPROCEDURE EVALUATION
Anesthesia Post Evaluation    Patient: Annalisa Dietrich    Procedure(s) Performed: Procedure(s) (LRB):  SALPINGECTOMY, LAPAROSCOPIC (N/A)    Final Anesthesia Type: general      Patient location during evaluation: PACU  Patient participation: Yes- Able to Participate  Level of consciousness: awake and alert  Post-procedure vital signs: reviewed and stable  Pain management: adequate  Airway patency: patent    PONV status at discharge: No PONV  Anesthetic complications: no      Cardiovascular status: blood pressure returned to baseline  Respiratory status: spontaneous ventilation  Hydration status: euvolemic  Follow-up not needed.          Vitals Value Taken Time   /74 06/18/24 1340   Temp 36.7 °C (98 °F) 06/18/24 1310   Pulse 65 06/18/24 1340   Resp 16 06/18/24 1340   SpO2 97 % 06/18/24 1340         Event Time   Out of Recovery 13:05:00         Pain/Frantz Score: Pain Rating Prior to Med Admin: 7 (6/18/2024 12:51 PM)  Frantz Score: 9 (6/18/2024  1:40 PM)

## 2024-06-19 LAB
FINAL PATHOLOGIC DIAGNOSIS: NORMAL
GROSS: NORMAL
Lab: NORMAL

## 2024-06-27 ENCOUNTER — OFFICE VISIT (OUTPATIENT)
Dept: FAMILY MEDICINE | Facility: HOSPITAL | Age: 24
End: 2024-06-27
Payer: MEDICAID

## 2024-06-27 VITALS
HEART RATE: 76 BPM | BODY MASS INDEX: 32.62 KG/M2 | WEIGHT: 220.25 LBS | SYSTOLIC BLOOD PRESSURE: 130 MMHG | HEIGHT: 69 IN | DIASTOLIC BLOOD PRESSURE: 84 MMHG

## 2024-06-27 DIAGNOSIS — R74.01 TRANSAMINITIS: ICD-10-CM

## 2024-06-27 DIAGNOSIS — D22.9 MULTIPLE ATYPICAL SKIN MOLES: ICD-10-CM

## 2024-06-27 DIAGNOSIS — K76.9 LESION OF LIVER GREATER THAN 1 CM IN DIAMETER: Primary | ICD-10-CM

## 2024-06-27 PROCEDURE — 99214 OFFICE O/P EST MOD 30 MIN: CPT

## 2024-06-27 NOTE — PROGRESS NOTES
"  \[facility]\"" FAMILY MEDICINE CLINIC NOTE  Follow-up Visit      SUBJECTIVE:     Patient: Annalisa Dietrich is a 24 y.o. female.    Chief Complaint:   Chief Complaint   Patient presents with    Follow-up    Referral       History of Present Illness:  Pt presents to clinic for follow up and referrals  Pt was referred to derm last visit due to multiple moles that are increasing in size, Fhx of skin cancer (see \[facility]\"" Family Medicine Clinic note dated 04/22/2024)  However, she still needs dermatology referral because she had a lapse in Medicaid coverage and was unable to set up appointment    Pt was recommended Hepatology referral for incidental finding of liver lesion, this was deferred previously due to pt being pregnant at the time  Abdominal MRI 02/20/2024 showed "2.2 cm lesion in segment 6 as detailed above corresponding to the sonographic abnormality.  Lesion remains indeterminate without IV contrast.  Recommend contrasted liver MRI following delivery."  Abdominal U/S 02/08/2024 with "Solid liver lesion.  MRI without and with contrast is recommended to further characterize this.  Malignancy is not excluded. This is hypoechoic. Sludge within the liver. Hepatomegaly."    Pt denies abdominal pain or s/s  No acute issues      ROS  A 10+ review of systems was performed with pertinent positives and negatives noted above in the history of present illness. Other systems were negative unless otherwise specified.    OBJECTIVE:     Vital Signs (Most Recent)  Vitals:    06/27/24 1336   BP: 130/84   Pulse: 76   Weight: 99.9 kg (220 lb 3.8 oz)   Height: 5' 9" (1.753 m)     BMI: Body mass index is 32.52 kg/m².     Physical Exam:  Gen: No apparent distress, cooperative & interactive  CV: RRR, S1 and S2 present  Resp: CTAB, normal respiratory effort     ASSESSMENT:   Annalisa Dietrich is a 24 y.o. female who presents to clinic to for    1. Lesion of liver greater than 1 cm in diameter    2. Multiple atypical skin moles    3. History of transaminitis "         PLAN:     1. Lesion of liver greater than 1 cm in diameter  - pt recommended follow up MRI w/ contrast as well as Hepatology referral which was deferred previously due to pt being pregnant at that time  - denies GI s/s  - Ambulatory referral/consult to Hepatology; Future  - MRI Abdomen With Contrast; Future    2. Multiple atypical skin moles  - stable (see [facility] note dated 04/22/2024)  - pt unable to obtain derm appointment due to lapse in Medicaid coverage   - Ambulatory referral/consult to Dermatology; Future    3. History of transaminitis  - Ambulatory referral/consult to Hepatology; Future  - MRI Abdomen With Contrast; Future      Provided patient with anticipatory guidance and return precautions. Treatment plan discussed with patient, all questions answered, and patient acknowledged understanding and verbal agreement.      Follow-up in: 6 months; or sooner PRN if acute concerns arise.      Case discussed with Dr. CALEB Plunkett MD, MPH  [facility] PGY-2  06/27/2024        The following information is provided to all patients.  This information is to help you find resources for any of the problems found today that may be affecting your health:                Living healthy guide: www.[facility].[place].gov       Understanding Diabetes: www.diabetes.org       Eating healthy: www.cdc.gov/healthyweight      CDC home safety checklist: www.cdc.gov/steadi/patient.html      Agency on Aging: www.goea.[place].gov       Alcoholics anonymous (AA): www.aa.org      Physical Activity: www.morro.nih.gov/aj6bpnk       Tobacco use: www.quitwithusla.org

## 2024-07-22 ENCOUNTER — HOSPITAL ENCOUNTER (OUTPATIENT)
Dept: RADIOLOGY | Facility: HOSPITAL | Age: 24
Discharge: HOME | End: 2024-07-22
Payer: MEDICAID

## 2024-07-22 DIAGNOSIS — R74.01 TRANSAMINITIS: ICD-10-CM

## 2024-07-22 DIAGNOSIS — K76.9 LESION OF LIVER GREATER THAN 1 CM IN DIAMETER: ICD-10-CM

## 2024-07-22 PROCEDURE — 74182 MRI ABDOMEN W/CONTRAST: CPT | Mod: TC

## 2024-07-22 PROCEDURE — A9585 GADOBUTROL INJECTION: HCPCS

## 2024-07-22 PROCEDURE — 74182 MRI ABDOMEN W/CONTRAST: CPT | Mod: 26,,, | Performed by: RADIOLOGY

## 2024-07-22 PROCEDURE — 25500020 PHARM REV CODE 255

## 2024-07-22 RX ORDER — GADOBUTROL 604.72 MG/ML
10 INJECTION INTRAVENOUS
Status: COMPLETED | OUTPATIENT
Start: 2024-07-22 | End: 2024-07-22

## 2024-07-22 RX ADMIN — GADOBUTROL 10 ML: 604.72 INJECTION INTRAVENOUS at 01:07

## 2024-07-25 ENCOUNTER — PATIENT MESSAGE (OUTPATIENT)
Dept: FAMILY MEDICINE | Facility: HOSPITAL | Age: 24
End: 2024-07-25
Payer: MEDICAID

## 2024-07-26 ENCOUNTER — TELEPHONE (OUTPATIENT)
Dept: FAMILY MEDICINE | Facility: HOSPITAL | Age: 24
End: 2024-07-26
Payer: MEDICAID

## 2024-07-26 NOTE — TELEPHONE ENCOUNTER
Called pt regarding MRI abdomen results from 07/22/2024.  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  Hepatomegaly and hepatic steatosis.     Likely benign per MRI results.  No medical management indicated at this time. Pt counseled on lifestyle modification with healthy diet and weight loss for hepatic steatosis.  Pt has appointment with hepatology Dr. Cardona on 09/05/2024.  Strongly advised patient to keep follow-up appointment with hepatology.  Patient verbalized understanding.  All questions answered.        Florinda Plunkett MD, MPH  \[facility]\"" Family Medicine PGY-3  07/26/2024

## 2024-10-26 ENCOUNTER — HOSPITAL ENCOUNTER (EMERGENCY)
Facility: HOSPITAL | Age: 24
Discharge: HOME | End: 2024-10-26
Attending: EMERGENCY MEDICINE
Payer: MEDICAID

## 2024-10-26 VITALS
OXYGEN SATURATION: 100 % | SYSTOLIC BLOOD PRESSURE: 135 MMHG | HEIGHT: 69 IN | WEIGHT: 228 LBS | HEART RATE: 60 BPM | RESPIRATION RATE: 16 BRPM | DIASTOLIC BLOOD PRESSURE: 63 MMHG | BODY MASS INDEX: 33.77 KG/M2 | TEMPERATURE: 98 F

## 2024-10-26 DIAGNOSIS — S99.919A ANKLE INJURY: ICD-10-CM

## 2024-10-26 DIAGNOSIS — S93.401A SPRAIN OF RIGHT ANKLE, UNSPECIFIED LIGAMENT, INITIAL ENCOUNTER: Primary | ICD-10-CM

## 2024-10-26 LAB
B-HCG UR QL: NEGATIVE
CTP QC/QA: YES

## 2024-10-26 PROCEDURE — 25000003 PHARM REV CODE 250: Performed by: NURSE PRACTITIONER

## 2024-10-26 PROCEDURE — 99283 EMERGENCY DEPT VISIT LOW MDM: CPT | Mod: 25

## 2024-10-26 PROCEDURE — 81025 URINE PREGNANCY TEST: CPT | Performed by: NURSE PRACTITIONER

## 2024-10-26 RX ORDER — IBUPROFEN 400 MG/1
800 TABLET ORAL
Status: COMPLETED | OUTPATIENT
Start: 2024-10-26 | End: 2024-10-26

## 2024-10-26 RX ORDER — IBUPROFEN 800 MG/1
800 TABLET ORAL EVERY 6 HOURS PRN
Qty: 20 TABLET | Refills: 0 | Status: SHIPPED | OUTPATIENT
Start: 2024-10-26

## 2024-10-26 RX ADMIN — IBUPROFEN 800 MG: 400 TABLET ORAL at 08:10

## 2024-10-29 ENCOUNTER — OFFICE VISIT (OUTPATIENT)
Dept: ORTHOPEDICS | Facility: CLINIC | Age: 24
End: 2024-10-29
Payer: MEDICAID

## 2024-10-29 ENCOUNTER — HOSPITAL ENCOUNTER (OUTPATIENT)
Dept: RADIOLOGY | Facility: HOSPITAL | Age: 24
Discharge: HOME | End: 2024-10-29
Attending: ORTHOPAEDIC SURGERY
Payer: MEDICAID

## 2024-10-29 DIAGNOSIS — S99.919A ANKLE INJURY: ICD-10-CM

## 2024-10-29 DIAGNOSIS — S93.401A SPRAIN OF RIGHT ANKLE, UNSPECIFIED LIGAMENT, INITIAL ENCOUNTER: ICD-10-CM

## 2024-10-29 DIAGNOSIS — M25.571 ACUTE RIGHT ANKLE PAIN: ICD-10-CM

## 2024-10-29 DIAGNOSIS — S93.491A SPRAIN OF ANTERIOR TALOFIBULAR LIGAMENT OF RIGHT ANKLE, INITIAL ENCOUNTER: Primary | ICD-10-CM

## 2024-10-29 DIAGNOSIS — M25.571 ACUTE RIGHT ANKLE PAIN: Primary | ICD-10-CM

## 2024-10-29 PROCEDURE — 73610 X-RAY EXAM OF ANKLE: CPT | Mod: TC,PO,RT

## 2024-10-29 PROCEDURE — 99212 OFFICE O/P EST SF 10 MIN: CPT | Mod: PBBFAC,25,PO | Performed by: ORTHOPAEDIC SURGERY

## 2024-10-29 PROCEDURE — 99203 OFFICE O/P NEW LOW 30 MIN: CPT | Mod: S$PBB,,, | Performed by: ORTHOPAEDIC SURGERY

## 2024-10-29 PROCEDURE — 73610 X-RAY EXAM OF ANKLE: CPT | Mod: 26,RT,, | Performed by: RADIOLOGY

## 2024-10-29 PROCEDURE — 73630 X-RAY EXAM OF FOOT: CPT | Mod: TC,PO,RT

## 2024-10-29 PROCEDURE — 3044F HG A1C LEVEL LT 7.0%: CPT | Mod: CPTII,,, | Performed by: ORTHOPAEDIC SURGERY

## 2024-10-29 PROCEDURE — 73630 X-RAY EXAM OF FOOT: CPT | Mod: 26,RT,, | Performed by: RADIOLOGY

## 2024-10-29 PROCEDURE — 99999 PR PBB SHADOW E&M-EST. PATIENT-LVL II: CPT | Mod: PBBFAC,,, | Performed by: ORTHOPAEDIC SURGERY

## 2024-10-29 NOTE — PROGRESS NOTES
Status/Diagnosis: Right ankle ATFL sprain; EDB muscle strain.  Date of Surgery: none  Date of Injury: 10/26/2024  Return visit: PRN  X-rays on Return: pending patient complaint    Chief Complaint: Right ankle pain    Present History:  Patient presents today via referral from Trudy Beltranwinston Dietrich is a 24 y.o. female presents today for new patient evaluation.  Endorses acute right ankle twisting injury on 10/26/2024.  Immediate lateral based ankle pain, swelling, difficulty bearing weight.  Also with some degree of new onset tingling involving the 3rd through 5th toes.  Seen at local ED at which time x-rays were taken and read as negative.  She was given crutches and a tall boot with instructions for outpatient orthopedic follow-up.  Denies any right ankle pain or instability prior to the above.  Currently taking oral Ibuprofen with mild relief.  Otherwise healthy.  Denies tobacco use.  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      Past Medical History:   Diagnosis Date    Anemia affecting pregnancy in first trimester 08/29/2023    Lesion of liver greater than 1 cm in diameter 2/9/2024    Migraine     Postpartum depression 02/10/2020       Past Surgical History:   Procedure Laterality Date    DENTAL SURGERY      LAPAROSCOPIC SALPINGECTOMY N/A 6/18/2024    Procedure: SALPINGECTOMY, LAPAROSCOPIC;  Surgeon: Dayana Boykin MD;  Location: [facility];  Service: OB/GYN;  Laterality: N/A;    POSTPARTUM LIGATION OF FALLOPIAN TUBE Bilateral 04/01/2024    Procedure: LIGATION, FALLOPIAN TUBE, POSTPARTUM;  Surgeon: Gisselle George MD;  Location: [facility];  Service: OB/GYN;  Laterality: Bilateral;       Current Outpatient Medications   Medication Sig    docusate sodium (COLACE) 100 MG capsule Take 2 capsules (200 mg total) by mouth 2 (two) times daily. (Patient not taking: Reported on 6/27/2024)    ibuprofen (ADVIL,MOTRIN) 800 MG tablet Take 1 tablet (800 mg total) by mouth every 6 (six) hours as needed.    oxyCODONE-acetaminophen  (PERCOCET) 5-325 mg per tablet Take 1 tablet by mouth every 4 (four) hours as needed for Pain. (Patient not taking: Reported on 2024)    polyethylene glycol (GLYCOLAX) 17 gram/dose powder Take 17 g by mouth once daily. (Patient not taking: Reported on 2024)     No current facility-administered medications for this visit.       Review of patient's allergies indicates:  No Known Allergies    Family History   Problem Relation Name Age of Onset    Cancer Maternal Grandmother      Hypertension Maternal Grandmother      Hypertension Mother      Arrhythmia Neg Hx      Cardiomyopathy Neg Hx      Congenital heart disease Neg Hx      Heart attacks under age 50 Neg Hx      Pacemaker/defibrilator Neg Hx         Social History     Socioeconomic History    Marital status:    Tobacco Use    Smoking status: Never    Smokeless tobacco: Never   Substance and Sexual Activity    Alcohol use: No    Drug use: No    Sexual activity: Yes     Partners: Male     Birth control/protection: Implant   Social History Narrative    ** Merged History Encounter **         Fob aunt  @ 36 from heart attack     Social Drivers of Health     Financial Resource Strain: Low Risk  (10/27/2023)    Overall Financial Resource Strain (CARDIA)     Difficulty of Paying Living Expenses: Not hard at all   Food Insecurity: No Food Insecurity (10/27/2023)    Hunger Vital Sign     Worried About Running Out of Food in the Last Year: Never true     Ran Out of Food in the Last Year: Never true   Transportation Needs: No Transportation Needs (10/27/2023)    PRAPARE - Transportation     Lack of Transportation (Medical): No     Lack of Transportation (Non-Medical): No   Stress: No Stress Concern Present (10/27/2023)    [employer] - Occupational Stress Questionnaire     Feeling of Stress : Not at all   Housing Stability: Unknown (10/27/2023)    Housing Stability Vital Sign     Unable to Pay for Housing in the Last Year:  No     Unstable Housing in the Last Year: No       Physical exam:  There were no vitals filed for this visit.  There is no height or weight on file to calculate BMI.  General: In no apparent distress; well developed and well nourished.  HEENT: normocephalic; atraumatic.  Cardiovascular: regular rate.  Respiratory: no increased work of breathing.  Musculoskeletal:   Gait: mild antalgic  Inspection:   Moderate residual swelling about the lateral ankle/hindfoot.  Tenderness on deep palpation of the ATFL origin and to a lesser degree over the EDP muscle belly.  Little to no tenderness along the course of the peroneals.  No medial based ankle swelling or tenderness noted.    No jolanta laxity on anterior drawer or varus/valgus talar tilt testing.  No pain referable to the foot.  Silfverskiold: Negative  Alignment:  Knee: neutral               Ankle: neutral              Hindfoot: neutral              Forefoot: neutral   Strength:              Dorsiflexion 5/5  Plantar flexion 5/5  Inversion 5/5  Eversion 5/5  Sensation:              SILT distally  ROM:              Ankle: near full with pain at extremes              Subtalar: near full with pain at extremes  Pulses: Palpable pedal pulse                   Imaging Studies/Outside documentation:  I have ordered/reviewed/interpreted the following images/outside documentation:  1. Weight-bearing 3-views of Right foot and ankle:   On my independent review, no acute bony abnormality noted.  Ankle mortise remains congruent.  Calcaneal pitch and talonavicular uncoverage within normal limits.  Some degree of a kissing lesion involving the dorsal medial talar neck.  No significant degenerative joint changes.        Assessment:  Annalisa Dietrich is a 24 y.o. female with Right ankle ATFL sprain; EDB muscle strain.     Plan:   Clinical and radiographic findings were discussed.  Recommend conservative management to include continuation of tall cam boot wear.  She may weightbear as  tolerated.    Discussed rest, ice, compression, elevation, over-the-counter oral NSAIDs as needed for pain.    Okay to return to work starting on 11/04.    Discussed delayed initiation of physical therapy per low ankle sprain protocol.    Tentatively planning on starting the week of 11/11.  Patient voiced understanding.  All questions were answered.  She was symptoms persist or worsen, consider MRI right ankle without contrast for further evaluation.    This note was created using voice recognition software and may contain grammatical errors.

## 2024-10-29 NOTE — LETTER
October 29, 2024      [facility]  [address]  [place] [address]  Phone: [phone]       Patient: Annalisa Dietrich   YOB: 2000  Date of Visit: 10/29/2024    To Whom It May Concern:    Pooja Dietrich  was at [facility] on 10/29/2024. The patient may return to work on 11/04/2024 with no restrictions. If you have any questions or concerns, or if I can be of further assistance, please do not hesitate to contact me.    Sincerely,    Trell Ramirez M.D.

## 2024-11-06 ENCOUNTER — OFFICE VISIT (OUTPATIENT)
Dept: PRIMARY CARE CLINIC | Facility: CLINIC | Age: 24
End: 2024-11-06
Payer: MEDICAID

## 2024-11-06 VITALS
DIASTOLIC BLOOD PRESSURE: 83 MMHG | HEART RATE: 77 BPM | OXYGEN SATURATION: 100 % | HEIGHT: 69 IN | BODY MASS INDEX: 35.43 KG/M2 | SYSTOLIC BLOOD PRESSURE: 119 MMHG | WEIGHT: 239.19 LBS

## 2024-11-06 DIAGNOSIS — D22.9 MULTIPLE ATYPICAL SKIN MOLES: ICD-10-CM

## 2024-11-06 DIAGNOSIS — D22.9 CHANGE IN COLOR OF SKIN MOLE: Primary | ICD-10-CM

## 2024-11-06 PROCEDURE — 99214 OFFICE O/P EST MOD 30 MIN: CPT | Mod: PBBFAC,PN

## 2024-11-06 NOTE — PROGRESS NOTES
"  [facility] FAMILY MEDICINE CLINIC NOTE  Follow-up Visit      SUBJECTIVE:     Patient: Annalisa Dietrich is a 24 y.o. female.    Chief Complaint:   Chief Complaint   Patient presents with    Mass       History of Present Illness:  Patient presents to clinic to follow-up on skin lesion on left thigh  Reports that on Sunday about 4 days ago she noticed development of scab  Also endorses itchiness, mild soreness to touch which is a new change  Denies bleeding  Previously referred to dermatology for multiple skin lesions, however 1st available appointment not until early 2025  Noted to have strong family history of skin cancer, see clinic note from 04/22/2024 for more information      Answers submitted by the patient for this visit:  Review of Systems Questionnaire (Submitted on 11/6/2024)  activity change: No  unexpected weight change: No  rhinorrhea: No  trouble swallowing: No  visual disturbance: No  chest tightness: No  polyuria: No  difficulty urinating: No  menstrual problem: No  joint swelling: No  arthralgias: No  confusion: No  dysphoric mood: No      Review of Systems   HENT:  Negative for hearing loss.    Eyes:  Negative for discharge.   Respiratory:  Negative for wheezing.    Cardiovascular:  Negative for chest pain and palpitations.   Gastrointestinal:  Negative for blood in stool, constipation, diarrhea and vomiting.   Genitourinary:  Negative for dysuria and hematuria.   Musculoskeletal:  Negative for neck pain.   Neurological:  Negative for weakness and headaches.   Endo/Heme/Allergies:  Negative for polydipsia.     A 10+ review of systems was performed with pertinent positives and negatives noted above in the history of present illness. Other systems were negative unless otherwise specified.    OBJECTIVE:     Vital Signs (Most Recent)  Vitals:    11/06/24 1530   BP: 119/83   BP Location: Left arm   Patient Position: Sitting   Pulse: 77   SpO2: 100%   Weight: 108.5 kg (239 lb 3.2 oz)   Height: 5' 9" (1.753 m) "     BMI: Body mass index is 35.32 kg/m².     Physical Exam:  Gen: No apparent distress, cooperative & interactive  CV: RRR, S1 and S2 present, no LE edema  Resp: CTAB, normal respiratory effort       Left mid lateral thigh skin lesion    ASSESSMENT:   Annalisa Dietrich is a 24 y.o. female who presents to clinic to for    1. Change in color of skin mole    2. Multiple atypical skin moles         PLAN:     1. Change in color of skin mole (Primary)  - Patient previously reported family history of skin cancer in her mother, maternal grandmother with melanoma, and 1st cousin on maternal side dx with skin cancer at age 31 y/o   - reports change in Left mid lateral thigh skin lesion/mole, associated with itchiness and soreness; also endorses change in color  - referred to dermatologist Dr. Coley v/[facility] to expedite appointment  - Ambulatory referral/consult to Dermatology; Future  - Ambulatory referral/consult to Dermatology; Future    2. Multiple atypical skin moles  - Ambulatory referral/consult to Dermatology; Future  - Ambulatory referral/consult to Dermatology; Future      Provided patient with anticipatory guidance and return precautions. Treatment plan discussed with patient, all questions answered, and patient acknowledged understanding and verbal agreement.      Follow-up in: 6 months; or sooner PRN if acute concerns arise.      Case discussed with Dr. ABELARDO Plunkett MD, MPH  [facility] PGY-3  11/06/2024        The following information is provided to all patients.  This information is to help you find resources for any of the problems found today that may be affecting your health:                Living healthy guide: www.[facility].[place].gov       Understanding Diabetes: www.diabetes.org       Eating healthy: www.cdc.gov/healthyweight      CDC home safety checklist: www.cdc.gov/steadi/patient.html      Agency on Aging: www.goea.[place].gov       Alcoholics anonymous (AA): www.aa.org       Physical Activity: www.morro.nih.gov/pn1oxdk       Tobacco use: www.quitwithusla.org

## 2025-02-27 ENCOUNTER — OFFICE VISIT (OUTPATIENT)
Dept: URGENT CARE | Facility: CLINIC | Age: 25
End: 2025-02-27
Payer: MEDICAID

## 2025-02-27 VITALS
BODY MASS INDEX: 35.43 KG/M2 | DIASTOLIC BLOOD PRESSURE: 80 MMHG | HEART RATE: 96 BPM | WEIGHT: 239.19 LBS | RESPIRATION RATE: 20 BRPM | TEMPERATURE: 99 F | OXYGEN SATURATION: 98 % | HEIGHT: 69 IN | SYSTOLIC BLOOD PRESSURE: 116 MMHG

## 2025-02-27 DIAGNOSIS — J02.0 STREP PHARYNGITIS: Primary | ICD-10-CM

## 2025-02-27 DIAGNOSIS — J35.1 TONSILLAR ENLARGEMENT: ICD-10-CM

## 2025-02-27 DIAGNOSIS — R06.2 WHEEZING: ICD-10-CM

## 2025-02-27 DIAGNOSIS — J02.9 SORE THROAT: ICD-10-CM

## 2025-02-27 LAB
CTP QC/QA: YES
MOLECULAR STREP A: POSITIVE

## 2025-02-27 PROCEDURE — 99213 OFFICE O/P EST LOW 20 MIN: CPT | Mod: S$GLB,,,

## 2025-02-27 PROCEDURE — 87651 STREP A DNA AMP PROBE: CPT | Mod: QW,S$GLB,,

## 2025-02-27 RX ORDER — AMOXICILLIN 500 MG/1
500 TABLET, FILM COATED ORAL EVERY 12 HOURS
Qty: 20 TABLET | Refills: 0 | Status: SHIPPED | OUTPATIENT
Start: 2025-02-27 | End: 2025-03-09

## 2025-02-27 RX ORDER — PREDNISONE 20 MG/1
20 TABLET ORAL DAILY
Qty: 4 TABLET | Refills: 0 | Status: SHIPPED | OUTPATIENT
Start: 2025-02-27 | End: 2025-03-03

## 2025-02-27 NOTE — PROGRESS NOTES
"Subjective:      Patient ID: Annalisa Dietrich is a 24 y.o. female.    Vitals:  height is 5' 9" (1.753 m) and weight is 108.5 kg (239 lb 3.2 oz). Her oral temperature is 98.5 °F (36.9 °C). Her blood pressure is 116/80 and her pulse is 96. Her respiration is 20 and oxygen saturation is 98%.     Chief Complaint: Sore Throat    Pt is a 23 yo F presenting with HA, fever, sore throat, trouble swallowing, congestion, cough, neck pain.  Onset of symptoms was yesterday. Denies fever, chills, myalgia, CP, SOB.  Pt reports using OTC Tylenol with no relief.    MA note:   Pt present with constant headaches, possible fever, sore throat with trouble swallowing, pt states it is swollen and hurts to breathe, says she looked at it and its really red, symptoms started yesterday, denies body aches and chills home tx; tylenol    Sore Throat   This is a new problem. The current episode started yesterday. The problem has been unchanged. The pain is at a severity of 9/10. The pain is severe. Associated symptoms include congestion, coughing, headaches, a hoarse voice, neck pain, swollen glands and trouble swallowing. Pertinent negatives include no abdominal pain, diarrhea, ear pain, shortness of breath or vomiting. She has tried acetaminophen for the symptoms. The treatment provided no relief.       Constitution: Negative for activity change, appetite change, chills and fever.   HENT:  Positive for congestion, sore throat and trouble swallowing. Negative for ear pain, postnasal drip, sinus pain and sinus pressure.    Neck: Positive for neck pain.   Cardiovascular:  Negative for chest pain and sob on exertion.   Eyes:  Negative for eye trauma, eye discharge, eye itching, eye redness, photophobia and blurred vision.   Respiratory:  Positive for cough. Negative for shortness of breath, wheezing and asthma.    Gastrointestinal:  Negative for abdominal pain, nausea, vomiting, constipation and diarrhea.   Genitourinary:  Negative for dysuria, " frequency, urgency, urine decreased and hematuria.   Musculoskeletal:  Negative for pain and muscle ache.   Skin:  Negative for color change, rash and hives.   Allergic/Immunologic: Negative for seasonal allergies, asthma, hives and sneezing.   Neurological:  Positive for headaches. Negative for dizziness, light-headedness and altered mental status.   Psychiatric/Behavioral:  Negative for altered mental status and confusion.       Objective:     Physical Exam   Constitutional: She is oriented to person, place, and time. She appears well-developed. She is cooperative.  Non-toxic appearance. She does not appear ill. No distress.      Comments:Pt sitting erect on examination table. No acute respiratory distress, no use of accessory muscles, no notice of nasal flaring.        HENT:   Head: Normocephalic and atraumatic.   Ears:   Right Ear: Hearing and external ear normal.   Left Ear: Hearing and external ear normal.   Nose: Nose normal. No mucosal edema, rhinorrhea or nasal deformity. No epistaxis. Right sinus exhibits no maxillary sinus tenderness and no frontal sinus tenderness. Left sinus exhibits no maxillary sinus tenderness and no frontal sinus tenderness.   Mouth/Throat: Uvula is midline, oropharynx is clear and moist and mucous membranes are normal. No trismus in the jaw. Normal dentition. No uvula swelling. No oropharyngeal exudate, posterior oropharyngeal edema or posterior oropharyngeal erythema.   Eyes: Conjunctivae and lids are normal. No scleral icterus.   Neck: Trachea normal and phonation normal. Neck supple. No edema present. No erythema present. No neck rigidity present.   Cardiovascular: Normal rate, regular rhythm, normal heart sounds and normal pulses.   Pulmonary/Chest: Effort normal. No respiratory distress. She has no decreased breath sounds. She has wheezes in the right upper field, the right middle field, the right lower field, the left upper field, the left middle field and the left lower  field. She has no rhonchi.   Expiratory wheeze          Comments: Expiratory wheeze     Abdominal: Normal appearance.   Musculoskeletal: Normal range of motion.         General: No deformity. Normal range of motion.   Neurological: She is alert and oriented to person, place, and time. She exhibits normal muscle tone. Coordination normal.   Skin: Skin is warm, dry, intact, not diaphoretic and not pale.   Psychiatric: Her speech is normal and behavior is normal. Judgment and thought content normal.   Nursing note and vitals reviewed.    Results for orders placed or performed in visit on 02/27/25   POCT Strep A, Molecular    Collection Time: 02/27/25  9:14 AM   Result Value Ref Range    Molecular Strep A, POC Positive (A) Negative     Acceptable Yes        Assessment:     1. Strep pharyngitis    2. Sore throat    3. Wheezing    4. Tonsillar enlargement        Plan:   I have reviewed the patient chart and pertinent past imaging/labs.      Strep pharyngitis  -     amoxicillin (AMOXIL) 500 MG Tab; Take 1 tablet (500 mg total) by mouth every 12 (twelve) hours. for 10 days  Dispense: 20 tablet; Refill: 0    Sore throat  -     POCT Strep A, Molecular    Wheezing    Tonsillar enlargement  -     predniSONE (DELTASONE) 20 MG tablet; Take 1 tablet (20 mg total) by mouth once daily. for 4 days  Dispense: 4 tablet; Refill: 0

## 2025-02-27 NOTE — PATIENT INSTRUCTIONS
Strep: Amoxicillin twice daily for 10 days   Inflammation: Prednisone once daily for 4 days   Fever/Pain: Alternate Tylenol and Ibuprofen as needed every 4-6 hours  Monitor for chest pain, shortness of breath, worsening of symptoms, or fever unresponsive to medication.   Please drink plenty of fluids.  Please get plenty of rest.  Please return here or go to the Emergency Department for any concerns or worsening of condition.  If you were prescribed antibiotics, please take them to completion.  If you were given wait & see antibiotics, please wait 5-7 days before taking them, and only take them if your symptoms have worsened or not improved.  If you do begin taking the antibiotics, please take them to completion.  If you were prescribed a narcotic medication, do not drive or operate heavy equipment or machinery while taking these medications.  If you were given a steroid shot in the clinic and have also been given a prescription for a steroid such as Prednisone or a Medrol Dose Pack, please begin taking them tomorrow.  If you do not have Hypertension or any history of palpitations, it is ok to take over the counter Sudafed or Mucinex D or Allegra-D or Claritin-D or Zyrtec-D.  If you do take one of the above, it is ok to combine that with plain over the counter Mucinex or Allegra or Claritin or Zyrtec.  If for example you are taking Zyrtec -D, you can combine that with Mucinex, but not Mucinex-D.  If you are taking Mucinex-D, you can combine that with plain Allegra or Claritin or Zyrtec.   If you do have Hypertension or palpitations, it is safe to take Coricidin HBP for relief of sinus symptoms.  If not allergic, please take over the counter Tylenol (Acetaminophen) and/or Motrin (Ibuprofen) as directed for control of pain and/or fever.  Please follow up with your primary care doctor or specialist as needed.    If you  smoke, please stop smoking.

## (undated) DEVICE — SOL NORMAL USPCA 0.9%

## (undated) DEVICE — ELECTRODE REM PLYHSV RETURN 9

## (undated) DEVICE — SUT MCRYL PLUS 4-0 PS2 27IN

## (undated) DEVICE — NDL INSUFFLATION VERRES 120MM

## (undated) DEVICE — TROCAR KII FIOS 5MM X 100MM

## (undated) DEVICE — SOL POVIDONE PREP IODINE 4 OZ

## (undated) DEVICE — KIT WING PAD POSITIONING

## (undated) DEVICE — PAD PREP CUFFED NS 24X48IN

## (undated) DEVICE — TOWEL OR XRAY BLUE 17X26IN

## (undated) DEVICE — GLOVE SENSICARE PI SURG 6

## (undated) DEVICE — SOL POVIDONE SCRUB IODINE 4 OZ

## (undated) DEVICE — PACK LAPAROSCOPY BAPTIST

## (undated) DEVICE — GLOVE SENSICARE PI GRN 6.5

## (undated) DEVICE — SEALER LIGASURE LAP 37CM 5MM

## (undated) DEVICE — SOL IRR SOD CHL .9% POUR

## (undated) DEVICE — DRAPE STERI LONG